# Patient Record
Sex: MALE | Race: WHITE | NOT HISPANIC OR LATINO | Employment: OTHER | URBAN - METROPOLITAN AREA
[De-identification: names, ages, dates, MRNs, and addresses within clinical notes are randomized per-mention and may not be internally consistent; named-entity substitution may affect disease eponyms.]

---

## 2020-01-01 ENCOUNTER — APPOINTMENT (OUTPATIENT)
Dept: RADIOLOGY | Facility: MEDICAL CENTER | Age: 60
DRG: 215 | End: 2020-01-01
Attending: INTERNAL MEDICINE
Payer: COMMERCIAL

## 2020-01-01 ENCOUNTER — TELEPHONE (OUTPATIENT)
Dept: CARDIOLOGY | Facility: MEDICAL CENTER | Age: 60
End: 2020-01-01

## 2020-01-01 ENCOUNTER — APPOINTMENT (OUTPATIENT)
Dept: RADIOLOGY | Facility: MEDICAL CENTER | Age: 60
DRG: 215 | End: 2020-01-01
Attending: PSYCHIATRY & NEUROLOGY
Payer: COMMERCIAL

## 2020-01-01 ENCOUNTER — APPOINTMENT (OUTPATIENT)
Dept: RADIOLOGY | Facility: MEDICAL CENTER | Age: 60
DRG: 215 | End: 2020-01-01
Attending: SURGERY
Payer: COMMERCIAL

## 2020-01-01 ENCOUNTER — APPOINTMENT (OUTPATIENT)
Dept: CARDIOLOGY | Facility: MEDICAL CENTER | Age: 60
DRG: 215 | End: 2020-01-01
Attending: INTERNAL MEDICINE
Payer: COMMERCIAL

## 2020-01-01 ENCOUNTER — APPOINTMENT (OUTPATIENT)
Dept: RADIOLOGY | Facility: MEDICAL CENTER | Age: 60
DRG: 215 | End: 2020-01-01
Attending: EMERGENCY MEDICINE
Payer: COMMERCIAL

## 2020-01-01 ENCOUNTER — ANESTHESIA EVENT (OUTPATIENT)
Dept: RADIOLOGY | Facility: MEDICAL CENTER | Age: 60
DRG: 215 | End: 2020-01-01
Payer: COMMERCIAL

## 2020-01-01 ENCOUNTER — ANESTHESIA EVENT (OUTPATIENT)
Dept: SURGERY | Facility: MEDICAL CENTER | Age: 60
DRG: 215 | End: 2020-01-01
Payer: COMMERCIAL

## 2020-01-01 ENCOUNTER — HOSPITAL ENCOUNTER (OUTPATIENT)
Facility: MEDICAL CENTER | Age: 60
End: 2020-04-09
Payer: COMMERCIAL

## 2020-01-01 ENCOUNTER — APPOINTMENT (OUTPATIENT)
Dept: RADIOLOGY | Facility: MEDICAL CENTER | Age: 60
DRG: 215 | End: 2020-01-01
Attending: NURSE PRACTITIONER
Payer: COMMERCIAL

## 2020-01-01 ENCOUNTER — ANESTHESIA (OUTPATIENT)
Dept: SURGERY | Facility: MEDICAL CENTER | Age: 60
DRG: 215 | End: 2020-01-01
Payer: COMMERCIAL

## 2020-01-01 ENCOUNTER — ANESTHESIA (OUTPATIENT)
Dept: RADIOLOGY | Facility: MEDICAL CENTER | Age: 60
DRG: 215 | End: 2020-01-01
Payer: COMMERCIAL

## 2020-01-01 ENCOUNTER — HOSPITAL ENCOUNTER (OUTPATIENT)
Facility: MEDICAL CENTER | Age: 60
End: 2020-07-30
Payer: COMMERCIAL

## 2020-01-01 ENCOUNTER — HOSPITAL ENCOUNTER (OUTPATIENT)
Facility: MEDICAL CENTER | Age: 60
End: 2020-04-10
Payer: COMMERCIAL

## 2020-01-01 ENCOUNTER — APPOINTMENT (OUTPATIENT)
Dept: CARDIOLOGY | Facility: MEDICAL CENTER | Age: 60
DRG: 215 | End: 2020-01-01
Attending: EMERGENCY MEDICINE
Payer: COMMERCIAL

## 2020-01-01 ENCOUNTER — HOSPITAL ENCOUNTER (INPATIENT)
Facility: MEDICAL CENTER | Age: 60
LOS: 7 days | DRG: 215 | End: 2020-08-06
Attending: EMERGENCY MEDICINE | Admitting: INTERNAL MEDICINE
Payer: COMMERCIAL

## 2020-01-01 ENCOUNTER — TELEMEDICINE (OUTPATIENT)
Dept: CARDIOLOGY | Facility: MEDICAL CENTER | Age: 60
End: 2020-01-01
Payer: COMMERCIAL

## 2020-01-01 ENCOUNTER — HOSPITAL ENCOUNTER (OUTPATIENT)
Facility: MEDICAL CENTER | Age: 60
End: 2020-04-07
Payer: COMMERCIAL

## 2020-01-01 ENCOUNTER — PATIENT OUTREACH (OUTPATIENT)
Dept: HEALTH INFORMATION MANAGEMENT | Facility: OTHER | Age: 60
End: 2020-01-01

## 2020-01-01 VITALS
WEIGHT: 204.15 LBS | BODY MASS INDEX: 29.23 KG/M2 | HEIGHT: 70 IN | HEART RATE: 142 BPM | SYSTOLIC BLOOD PRESSURE: 100 MMHG | DIASTOLIC BLOOD PRESSURE: 67 MMHG | RESPIRATION RATE: 23 BRPM | TEMPERATURE: 100 F | OXYGEN SATURATION: 97 %

## 2020-01-01 VITALS — HEIGHT: 70 IN | WEIGHT: 187 LBS | BODY MASS INDEX: 26.77 KG/M2

## 2020-01-01 DIAGNOSIS — I70.229 CRITICAL LOWER LIMB ISCHEMIA (HCC): ICD-10-CM

## 2020-01-01 DIAGNOSIS — N17.9 ACUTE KIDNEY INJURY (HCC): ICD-10-CM

## 2020-01-01 DIAGNOSIS — Z95.1 S/P CABG X 3: Chronic | ICD-10-CM

## 2020-01-01 DIAGNOSIS — I25.10 ASCVD (ARTERIOSCLEROTIC CARDIOVASCULAR DISEASE): ICD-10-CM

## 2020-01-01 DIAGNOSIS — R57.0 CARDIOGENIC SHOCK (HCC): ICD-10-CM

## 2020-01-01 DIAGNOSIS — J81.0 ACUTE PULMONARY EDEMA (HCC): ICD-10-CM

## 2020-01-01 DIAGNOSIS — I25.10 CORONARY ARTERY DISEASE INVOLVING NATIVE CORONARY ARTERY OF NATIVE HEART WITHOUT ANGINA PECTORIS: ICD-10-CM

## 2020-01-01 DIAGNOSIS — I50.9 CONGESTIVE HEART FAILURE, UNSPECIFIED HF CHRONICITY, UNSPECIFIED HEART FAILURE TYPE (HCC): ICD-10-CM

## 2020-01-01 DIAGNOSIS — I21.4 NSTEMI (NON-ST ELEVATED MYOCARDIAL INFARCTION) (HCC): ICD-10-CM

## 2020-01-01 DIAGNOSIS — J96.01 ACUTE RESPIRATORY FAILURE WITH HYPOXIA (HCC): ICD-10-CM

## 2020-01-01 DIAGNOSIS — R79.89 ELEVATED TROPONIN: ICD-10-CM

## 2020-01-01 DIAGNOSIS — I10 ESSENTIAL HYPERTENSION, BENIGN: ICD-10-CM

## 2020-01-01 DIAGNOSIS — I21.02 ACUTE ST ELEVATION MYOCARDIAL INFARCTION (STEMI) INVOLVING LEFT ANTERIOR DESCENDING (LAD) CORONARY ARTERY (HCC): ICD-10-CM

## 2020-01-01 LAB
ABO GROUP BLD: NORMAL
ABO GROUP BLD: NORMAL
ACT BLD: 120 SEC (ref 74–137)
ACT BLD: 175 SEC (ref 74–137)
ACT BLD: 213 SEC (ref 74–137)
ACT BLD: 235 SEC (ref 74–137)
ACT BLD: 252 SEC (ref 74–137)
ACTION RANGE TRIGGERED IACRT: NO
ACTION RANGE TRIGGERED IACRT: YES
ALBUMIN SERPL BCP-MCNC: 2.9 G/DL (ref 3.2–4.9)
ALBUMIN SERPL BCP-MCNC: 3 G/DL (ref 3.2–4.9)
ALBUMIN SERPL BCP-MCNC: 3.3 G/DL (ref 3.2–4.9)
ALBUMIN SERPL BCP-MCNC: 3.6 G/DL (ref 3.2–4.9)
ALBUMIN/GLOB SERPL: 1.3 G/DL
ALBUMIN/GLOB SERPL: 1.3 G/DL
ALBUMIN/GLOB SERPL: 1.4 G/DL
ALBUMIN/GLOB SERPL: 1.5 G/DL
ALP SERPL-CCNC: 24 U/L (ref 30–99)
ALP SERPL-CCNC: 26 U/L (ref 30–99)
ALP SERPL-CCNC: 26 U/L (ref 30–99)
ALP SERPL-CCNC: 27 U/L (ref 30–99)
ALP SERPL-CCNC: 28 U/L (ref 30–99)
ALP SERPL-CCNC: 28 U/L (ref 30–99)
ALP SERPL-CCNC: 35 U/L (ref 30–99)
ALT SERPL-CCNC: 101 U/L (ref 2–50)
ALT SERPL-CCNC: 11 U/L (ref 2–50)
ALT SERPL-CCNC: 12 U/L (ref 2–50)
ALT SERPL-CCNC: 15 U/L (ref 2–50)
ALT SERPL-CCNC: 22 U/L (ref 2–50)
ALT SERPL-CCNC: 27 U/L (ref 2–50)
ALT SERPL-CCNC: 31 U/L (ref 2–50)
ANION GAP SERPL CALC-SCNC: 12 MMOL/L (ref 7–16)
ANION GAP SERPL CALC-SCNC: 13 MMOL/L (ref 7–16)
ANION GAP SERPL CALC-SCNC: 13 MMOL/L (ref 7–16)
ANION GAP SERPL CALC-SCNC: 14 MMOL/L (ref 7–16)
ANION GAP SERPL CALC-SCNC: 15 MMOL/L (ref 7–16)
ANION GAP SERPL CALC-SCNC: 17 MMOL/L (ref 7–16)
ANION GAP SERPL CALC-SCNC: 18 MMOL/L (ref 7–16)
ANION GAP SERPL CALC-SCNC: 30 MMOL/L (ref 7–16)
APTT PPP: 114.9 SEC (ref 24.7–36)
APTT PPP: 116.1 SEC (ref 24.7–36)
APTT PPP: 140.9 SEC (ref 24.7–36)
APTT PPP: 150.4 SEC (ref 24.7–36)
APTT PPP: 186.2 SEC (ref 24.7–36)
APTT PPP: 199.2 SEC (ref 24.7–36)
APTT PPP: 224.5 SEC (ref 24.7–36)
APTT PPP: 47.1 SEC (ref 24.7–36)
APTT PPP: 48.5 SEC (ref 24.7–36)
APTT PPP: 52.2 SEC (ref 24.7–36)
APTT PPP: 63.6 SEC (ref 24.7–36)
APTT PPP: 65.1 SEC (ref 24.7–36)
APTT PPP: 67.1 SEC (ref 24.7–36)
APTT PPP: 70.8 SEC (ref 24.7–36)
APTT PPP: 79.8 SEC (ref 24.7–36)
APTT PPP: 81.8 SEC (ref 24.7–36)
APTT PPP: 85.3 SEC (ref 24.7–36)
APTT PPP: 96 SEC (ref 24.7–36)
APTT PPP: >240 SEC (ref 24.7–36)
AST SERPL-CCNC: 115 U/L (ref 12–45)
AST SERPL-CCNC: 132 U/L (ref 12–45)
AST SERPL-CCNC: 240 U/L (ref 12–45)
AST SERPL-CCNC: 43 U/L (ref 12–45)
AST SERPL-CCNC: 49 U/L (ref 12–45)
AST SERPL-CCNC: 50 U/L (ref 12–45)
AST SERPL-CCNC: 54 U/L (ref 12–45)
BACTERIA BLD CULT: NORMAL
BACTERIA BLD CULT: NORMAL
BARCODED ABORH UBTYP: 6200
BARCODED PRD CODE UBPRD: NORMAL
BARCODED UNIT NUM UBUNT: NORMAL
BASE EXCESS BLDA CALC-SCNC: -1 MMOL/L (ref -4–3)
BASE EXCESS BLDA CALC-SCNC: -1 MMOL/L (ref -4–3)
BASE EXCESS BLDA CALC-SCNC: -10 MMOL/L (ref -4–3)
BASE EXCESS BLDA CALC-SCNC: -10 MMOL/L (ref -4–3)
BASE EXCESS BLDA CALC-SCNC: -11 MMOL/L (ref -4–3)
BASE EXCESS BLDA CALC-SCNC: -12 MMOL/L (ref -4–3)
BASE EXCESS BLDA CALC-SCNC: -13 MMOL/L (ref -4–3)
BASE EXCESS BLDA CALC-SCNC: -14 MMOL/L (ref -4–3)
BASE EXCESS BLDA CALC-SCNC: -3 MMOL/L (ref -4–3)
BASE EXCESS BLDA CALC-SCNC: -5 MMOL/L (ref -4–3)
BASE EXCESS BLDA CALC-SCNC: -8 MMOL/L (ref -4–3)
BASE EXCESS BLDA CALC-SCNC: -9 MMOL/L (ref -4–3)
BASE EXCESS BLDA CALC-SCNC: 11 MMOL/L (ref -4–3)
BASE EXCESS BLDMV CALC-SCNC: -12 MMOL/L
BASE EXCESS BLDV CALC-SCNC: -12 MMOL/L
BASE EXCESS BLDV CALC-SCNC: -5 MMOL/L (ref -4–3)
BASOPHILS # BLD AUTO: 0.3 % (ref 0–1.8)
BASOPHILS # BLD AUTO: 0.4 % (ref 0–1.8)
BASOPHILS # BLD AUTO: 0.6 % (ref 0–1.8)
BASOPHILS # BLD AUTO: 0.6 % (ref 0–1.8)
BASOPHILS # BLD: 0.03 K/UL (ref 0–0.12)
BASOPHILS # BLD: 0.03 K/UL (ref 0–0.12)
BASOPHILS # BLD: 0.05 K/UL (ref 0–0.12)
BASOPHILS # BLD: 0.05 K/UL (ref 0–0.12)
BASOPHILS # BLD: 0.06 K/UL (ref 0–0.12)
BASOPHILS # BLD: 0.07 K/UL (ref 0–0.12)
BASOPHILS # BLD: 0.09 K/UL (ref 0–0.12)
BASOPHILS # BLD: 0.14 K/UL (ref 0–0.12)
BILIRUB SERPL-MCNC: 0.3 MG/DL (ref 0.1–1.5)
BILIRUB SERPL-MCNC: 0.4 MG/DL (ref 0.1–1.5)
BILIRUB SERPL-MCNC: 0.4 MG/DL (ref 0.1–1.5)
BILIRUB SERPL-MCNC: 0.5 MG/DL (ref 0.1–1.5)
BILIRUB SERPL-MCNC: 0.5 MG/DL (ref 0.1–1.5)
BILIRUB SERPL-MCNC: 0.7 MG/DL (ref 0.1–1.5)
BILIRUB SERPL-MCNC: 1.2 MG/DL (ref 0.1–1.5)
BLD GP AB SCN SERPL QL: NORMAL
BLD GP AB SCN SERPL QL: NORMAL
BODY TEMPERATURE: ABNORMAL CENTIGRADE
BODY TEMPERATURE: ABNORMAL DEGREES
BODY TEMPERATURE: NORMAL DEGREES
BUN SERPL-MCNC: 20 MG/DL (ref 8–22)
BUN SERPL-MCNC: 23 MG/DL (ref 8–22)
BUN SERPL-MCNC: 24 MG/DL (ref 8–22)
BUN SERPL-MCNC: 24 MG/DL (ref 8–22)
BUN SERPL-MCNC: 25 MG/DL (ref 8–22)
BUN SERPL-MCNC: 25 MG/DL (ref 8–22)
BUN SERPL-MCNC: 26 MG/DL (ref 8–22)
BUN SERPL-MCNC: 29 MG/DL (ref 8–22)
BUN SERPL-MCNC: 29 MG/DL (ref 8–22)
CA-I BLD ISE-SCNC: 1.29 MMOL/L (ref 1.1–1.3)
CA-I BLD ISE-SCNC: 1.38 MMOL/L (ref 1.1–1.3)
CA-I BLD ISE-SCNC: 2.04 MMOL/L (ref 1.1–1.3)
CA-I BLD ISE-SCNC: 2.05 MMOL/L (ref 1.1–1.3)
CA-I SERPL-SCNC: 1 MMOL/L (ref 1.1–1.3)
CALCIUM SERPL-MCNC: 7.5 MG/DL (ref 8.5–10.5)
CALCIUM SERPL-MCNC: 7.6 MG/DL (ref 8.5–10.5)
CALCIUM SERPL-MCNC: 7.7 MG/DL (ref 8.5–10.5)
CALCIUM SERPL-MCNC: 7.8 MG/DL (ref 8.5–10.5)
CALCIUM SERPL-MCNC: 7.9 MG/DL (ref 8.5–10.5)
CALCIUM SERPL-MCNC: 8.1 MG/DL (ref 8.5–10.5)
CALCIUM SERPL-MCNC: 8.2 MG/DL (ref 8.5–10.5)
CALCIUM SERPL-MCNC: 8.3 MG/DL (ref 8.5–10.5)
CALCIUM SERPL-MCNC: 8.3 MG/DL (ref 8.5–10.5)
CALCIUM SERPL-MCNC: 8.5 MG/DL (ref 8.5–10.5)
CALCIUM SERPL-MCNC: 8.5 MG/DL (ref 8.5–10.5)
CALCIUM SERPL-MCNC: 8.7 MG/DL (ref 8.5–10.5)
CALCIUM SERPL-MCNC: 8.9 MG/DL (ref 8.5–10.5)
CFT BLD TEG: 25.5 MIN (ref 5–10)
CFT P HPASE BLD TEG: 7.7 MIN (ref 5–10)
CHLORIDE SERPL-SCNC: 105 MMOL/L (ref 96–112)
CHLORIDE SERPL-SCNC: 106 MMOL/L (ref 96–112)
CHLORIDE SERPL-SCNC: 107 MMOL/L (ref 96–112)
CHLORIDE SERPL-SCNC: 110 MMOL/L (ref 96–112)
CHLORIDE SERPL-SCNC: 110 MMOL/L (ref 96–112)
CHLORIDE SERPL-SCNC: 111 MMOL/L (ref 96–112)
CHLORIDE SERPL-SCNC: 111 MMOL/L (ref 96–112)
CHLORIDE SERPL-SCNC: 114 MMOL/L (ref 96–112)
CHLORIDE SERPL-SCNC: 114 MMOL/L (ref 96–112)
CHLORIDE SERPL-SCNC: 115 MMOL/L (ref 96–112)
CLOT ANGLE BLD TEG: 19.5 DEGREES (ref 53–72)
CLOT ANGLE P HPASE BLD TEG: 57 DEGREES (ref 53–72)
CLOT INIT P HPASE BLD TEG: 2.3 MIN (ref 1–3)
CLOT LYSIS 30M P MA LENFR BLD TEG: 0 % (ref 0–8)
CLOT LYSIS 30M P MA LENFR BLD TEG: 0 % (ref 0–8)
CO2 BLDA-SCNC: 12 MMOL/L (ref 20–33)
CO2 BLDA-SCNC: 13 MMOL/L (ref 20–33)
CO2 BLDA-SCNC: 15 MMOL/L (ref 20–33)
CO2 BLDA-SCNC: 16 MMOL/L (ref 20–33)
CO2 BLDA-SCNC: 17 MMOL/L (ref 20–33)
CO2 BLDA-SCNC: 18 MMOL/L (ref 20–33)
CO2 BLDA-SCNC: 20 MMOL/L (ref 20–33)
CO2 BLDA-SCNC: 20 MMOL/L (ref 20–33)
CO2 BLDA-SCNC: 21 MMOL/L (ref 20–33)
CO2 BLDA-SCNC: 22 MMOL/L (ref 20–33)
CO2 BLDA-SCNC: 22 MMOL/L (ref 20–33)
CO2 BLDA-SCNC: 23 MMOL/L (ref 20–33)
CO2 BLDA-SCNC: 39 MMOL/L (ref 20–33)
CO2 BLDV-SCNC: 20 MMOL/L (ref 20–33)
CO2 SERPL-SCNC: 11 MMOL/L (ref 20–33)
CO2 SERPL-SCNC: 13 MMOL/L (ref 20–33)
CO2 SERPL-SCNC: 13 MMOL/L (ref 20–33)
CO2 SERPL-SCNC: 14 MMOL/L (ref 20–33)
CO2 SERPL-SCNC: 14 MMOL/L (ref 20–33)
CO2 SERPL-SCNC: 15 MMOL/L (ref 20–33)
CO2 SERPL-SCNC: 15 MMOL/L (ref 20–33)
CO2 SERPL-SCNC: 17 MMOL/L (ref 20–33)
CO2 SERPL-SCNC: 19 MMOL/L (ref 20–33)
CO2 SERPL-SCNC: 19 MMOL/L (ref 20–33)
CO2 SERPL-SCNC: 21 MMOL/L (ref 20–33)
CO2 SERPL-SCNC: 22 MMOL/L (ref 20–33)
CO2 SERPL-SCNC: 9 MMOL/L (ref 20–33)
COMPONENT R 8504R: NORMAL
COVID ORDER STATUS COVID19: NORMAL
COVID ORDER STATUS COVID19: NORMAL
CREAT SERPL-MCNC: 0.96 MG/DL (ref 0.5–1.4)
CREAT SERPL-MCNC: 0.98 MG/DL (ref 0.5–1.4)
CREAT SERPL-MCNC: 0.99 MG/DL (ref 0.5–1.4)
CREAT SERPL-MCNC: 1.02 MG/DL (ref 0.5–1.4)
CREAT SERPL-MCNC: 1.05 MG/DL (ref 0.5–1.4)
CREAT SERPL-MCNC: 1.06 MG/DL (ref 0.5–1.4)
CREAT SERPL-MCNC: 1.07 MG/DL (ref 0.5–1.4)
CREAT SERPL-MCNC: 1.09 MG/DL (ref 0.5–1.4)
CREAT SERPL-MCNC: 1.18 MG/DL (ref 0.5–1.4)
CREAT SERPL-MCNC: 1.22 MG/DL (ref 0.5–1.4)
CREAT SERPL-MCNC: 1.26 MG/DL (ref 0.5–1.4)
CREAT SERPL-MCNC: 1.38 MG/DL (ref 0.5–1.4)
CREAT SERPL-MCNC: 1.44 MG/DL (ref 0.5–1.4)
CRP SERPL HS-MCNC: 4.17 MG/DL (ref 0–0.75)
CRP SERPL HS-MCNC: 4.65 MG/DL (ref 0–0.75)
CT.EXTRINSIC BLD ROTEM: 12 MIN (ref 1–3)
EKG IMPRESSION: NORMAL
EOSINOPHIL # BLD AUTO: 0.03 K/UL (ref 0–0.51)
EOSINOPHIL # BLD AUTO: 0.14 K/UL (ref 0–0.51)
EOSINOPHIL # BLD AUTO: 0.14 K/UL (ref 0–0.51)
EOSINOPHIL # BLD AUTO: 0.16 K/UL (ref 0–0.51)
EOSINOPHIL # BLD AUTO: 0.25 K/UL (ref 0–0.51)
EOSINOPHIL # BLD AUTO: 0.3 K/UL (ref 0–0.51)
EOSINOPHIL # BLD AUTO: 0.32 K/UL (ref 0–0.51)
EOSINOPHIL # BLD AUTO: 0.37 K/UL (ref 0–0.51)
EOSINOPHIL NFR BLD: 0.1 % (ref 0–6.9)
EOSINOPHIL NFR BLD: 1 % (ref 0–6.9)
EOSINOPHIL NFR BLD: 1.6 % (ref 0–6.9)
EOSINOPHIL NFR BLD: 1.9 % (ref 0–6.9)
EOSINOPHIL NFR BLD: 3.2 % (ref 0–6.9)
EOSINOPHIL NFR BLD: 3.3 % (ref 0–6.9)
ERYTHROCYTE [DISTWIDTH] IN BLOOD BY AUTOMATED COUNT: 49.6 FL (ref 35.9–50)
ERYTHROCYTE [DISTWIDTH] IN BLOOD BY AUTOMATED COUNT: 50.8 FL (ref 35.9–50)
ERYTHROCYTE [DISTWIDTH] IN BLOOD BY AUTOMATED COUNT: 51.1 FL (ref 35.9–50)
ERYTHROCYTE [DISTWIDTH] IN BLOOD BY AUTOMATED COUNT: 51.5 FL (ref 35.9–50)
ERYTHROCYTE [DISTWIDTH] IN BLOOD BY AUTOMATED COUNT: 52.1 FL (ref 35.9–50)
ERYTHROCYTE [DISTWIDTH] IN BLOOD BY AUTOMATED COUNT: 52.1 FL (ref 35.9–50)
ERYTHROCYTE [DISTWIDTH] IN BLOOD BY AUTOMATED COUNT: 52.3 FL (ref 35.9–50)
ERYTHROCYTE [DISTWIDTH] IN BLOOD BY AUTOMATED COUNT: 52.9 FL (ref 35.9–50)
ERYTHROCYTE [DISTWIDTH] IN BLOOD BY AUTOMATED COUNT: 53.2 FL (ref 35.9–50)
ERYTHROCYTE [DISTWIDTH] IN BLOOD BY AUTOMATED COUNT: 53.4 FL (ref 35.9–50)
ERYTHROCYTE [DISTWIDTH] IN BLOOD BY AUTOMATED COUNT: 54.3 FL (ref 35.9–50)
ERYTHROCYTE [DISTWIDTH] IN BLOOD BY AUTOMATED COUNT: 55.9 FL (ref 35.9–50)
ERYTHROCYTE [DISTWIDTH] IN BLOOD BY AUTOMATED COUNT: 62.6 FL (ref 35.9–50)
FIBRINOGEN PPP-MCNC: 377 MG/DL (ref 215–460)
GLOBULIN SER CALC-MCNC: 1.9 G/DL (ref 1.9–3.5)
GLOBULIN SER CALC-MCNC: 2.1 G/DL (ref 1.9–3.5)
GLOBULIN SER CALC-MCNC: 2.2 G/DL (ref 1.9–3.5)
GLOBULIN SER CALC-MCNC: 2.3 G/DL (ref 1.9–3.5)
GLOBULIN SER CALC-MCNC: 2.3 G/DL (ref 1.9–3.5)
GLOBULIN SER CALC-MCNC: 2.4 G/DL (ref 1.9–3.5)
GLOBULIN SER CALC-MCNC: 2.6 G/DL (ref 1.9–3.5)
GLUCOSE BLD-MCNC: 100 MG/DL (ref 65–99)
GLUCOSE BLD-MCNC: 108 MG/DL (ref 65–99)
GLUCOSE BLD-MCNC: 110 MG/DL (ref 65–99)
GLUCOSE BLD-MCNC: 112 MG/DL (ref 65–99)
GLUCOSE BLD-MCNC: 113 MG/DL (ref 65–99)
GLUCOSE BLD-MCNC: 113 MG/DL (ref 65–99)
GLUCOSE BLD-MCNC: 114 MG/DL (ref 65–99)
GLUCOSE BLD-MCNC: 118 MG/DL (ref 65–99)
GLUCOSE BLD-MCNC: 119 MG/DL (ref 65–99)
GLUCOSE BLD-MCNC: 119 MG/DL (ref 65–99)
GLUCOSE BLD-MCNC: 122 MG/DL (ref 65–99)
GLUCOSE BLD-MCNC: 125 MG/DL (ref 65–99)
GLUCOSE BLD-MCNC: 127 MG/DL (ref 65–99)
GLUCOSE BLD-MCNC: 129 MG/DL (ref 65–99)
GLUCOSE BLD-MCNC: 130 MG/DL (ref 65–99)
GLUCOSE BLD-MCNC: 131 MG/DL (ref 65–99)
GLUCOSE BLD-MCNC: 133 MG/DL (ref 65–99)
GLUCOSE BLD-MCNC: 135 MG/DL (ref 65–99)
GLUCOSE BLD-MCNC: 135 MG/DL (ref 65–99)
GLUCOSE BLD-MCNC: 137 MG/DL (ref 65–99)
GLUCOSE BLD-MCNC: 139 MG/DL (ref 65–99)
GLUCOSE BLD-MCNC: 140 MG/DL (ref 65–99)
GLUCOSE BLD-MCNC: 143 MG/DL (ref 65–99)
GLUCOSE BLD-MCNC: 169 MG/DL (ref 65–99)
GLUCOSE BLD-MCNC: 178 MG/DL (ref 65–99)
GLUCOSE BLD-MCNC: 70 MG/DL (ref 65–99)
GLUCOSE BLD-MCNC: 78 MG/DL (ref 65–99)
GLUCOSE BLD-MCNC: 83 MG/DL (ref 65–99)
GLUCOSE SERPL-MCNC: 106 MG/DL (ref 65–99)
GLUCOSE SERPL-MCNC: 117 MG/DL (ref 65–99)
GLUCOSE SERPL-MCNC: 125 MG/DL (ref 65–99)
GLUCOSE SERPL-MCNC: 127 MG/DL (ref 65–99)
GLUCOSE SERPL-MCNC: 130 MG/DL (ref 65–99)
GLUCOSE SERPL-MCNC: 138 MG/DL (ref 65–99)
GLUCOSE SERPL-MCNC: 140 MG/DL (ref 65–99)
GLUCOSE SERPL-MCNC: 141 MG/DL (ref 65–99)
GLUCOSE SERPL-MCNC: 143 MG/DL (ref 65–99)
GLUCOSE SERPL-MCNC: 147 MG/DL (ref 65–99)
GLUCOSE SERPL-MCNC: 152 MG/DL (ref 65–99)
GLUCOSE SERPL-MCNC: 181 MG/DL (ref 65–99)
GLUCOSE SERPL-MCNC: 186 MG/DL (ref 65–99)
HCO3 BLDA-SCNC: 11.7 MMOL/L (ref 17–25)
HCO3 BLDA-SCNC: 11.9 MMOL/L (ref 17–25)
HCO3 BLDA-SCNC: 14.2 MMOL/L (ref 17–25)
HCO3 BLDA-SCNC: 15.6 MMOL/L (ref 17–25)
HCO3 BLDA-SCNC: 15.8 MMOL/L (ref 17–25)
HCO3 BLDA-SCNC: 16.5 MMOL/L (ref 17–25)
HCO3 BLDA-SCNC: 18.1 MMOL/L (ref 17–25)
HCO3 BLDA-SCNC: 18.8 MMOL/L (ref 17–25)
HCO3 BLDA-SCNC: 19.8 MMOL/L (ref 17–25)
HCO3 BLDA-SCNC: 21.2 MMOL/L (ref 17–25)
HCO3 BLDA-SCNC: 21.7 MMOL/L (ref 17–25)
HCO3 BLDA-SCNC: 21.8 MMOL/L (ref 17–25)
HCO3 BLDA-SCNC: 37.5 MMOL/L (ref 17–25)
HCO3 BLDMV-SCNC: 14 MMOL/L
HCO3 BLDV-SCNC: 14 MMOL/L (ref 24–28)
HCO3 BLDV-SCNC: 18.9 MMOL/L (ref 24–28)
HCT VFR BLD AUTO: 21.7 % (ref 42–52)
HCT VFR BLD AUTO: 21.8 % (ref 42–52)
HCT VFR BLD AUTO: 22.2 % (ref 42–52)
HCT VFR BLD AUTO: 23 % (ref 42–52)
HCT VFR BLD AUTO: 23.2 % (ref 42–52)
HCT VFR BLD AUTO: 23.9 % (ref 42–52)
HCT VFR BLD AUTO: 24.1 % (ref 42–52)
HCT VFR BLD AUTO: 25.4 % (ref 42–52)
HCT VFR BLD AUTO: 25.8 % (ref 42–52)
HCT VFR BLD AUTO: 26.4 % (ref 42–52)
HCT VFR BLD AUTO: 29.1 % (ref 42–52)
HCT VFR BLD AUTO: 32.2 % (ref 42–52)
HCT VFR BLD AUTO: 37.5 % (ref 42–52)
HCT VFR BLD AUTO: 45.9 % (ref 42–52)
HCT VFR BLD CALC: 23 % (ref 42–52)
HCT VFR BLD CALC: 24 % (ref 42–52)
HCT VFR BLD CALC: 24 % (ref 42–52)
HCT VFR BLD CALC: 33 % (ref 42–52)
HGB BLD-MCNC: 11.2 G/DL (ref 14–18)
HGB BLD-MCNC: 12.6 G/DL (ref 14–18)
HGB BLD-MCNC: 15 G/DL (ref 14–18)
HGB BLD-MCNC: 6.8 G/DL (ref 14–18)
HGB BLD-MCNC: 7.2 G/DL (ref 14–18)
HGB BLD-MCNC: 7.3 G/DL (ref 14–18)
HGB BLD-MCNC: 7.7 G/DL (ref 14–18)
HGB BLD-MCNC: 7.7 G/DL (ref 14–18)
HGB BLD-MCNC: 7.8 G/DL (ref 14–18)
HGB BLD-MCNC: 7.9 G/DL (ref 14–18)
HGB BLD-MCNC: 8.1 G/DL (ref 14–18)
HGB BLD-MCNC: 8.2 G/DL (ref 14–18)
HGB BLD-MCNC: 8.2 G/DL (ref 14–18)
HGB BLD-MCNC: 8.4 G/DL (ref 14–18)
HGB BLD-MCNC: 8.4 G/DL (ref 14–18)
HGB BLD-MCNC: 8.6 G/DL (ref 14–18)
HGB BLD-MCNC: 9.5 G/DL (ref 14–18)
HGB BLD-MCNC: 9.7 G/DL (ref 14–18)
HOROWITZ INDEX BLDA+IHG-RTO: 120 MM[HG]
HOROWITZ INDEX BLDA+IHG-RTO: 126 MM[HG]
HOROWITZ INDEX BLDA+IHG-RTO: 148 MM[HG]
HOROWITZ INDEX BLDA+IHG-RTO: 206 MM[HG]
HOROWITZ INDEX BLDA+IHG-RTO: 233 MM[HG]
HOROWITZ INDEX BLDA+IHG-RTO: 248 MM[HG]
HOROWITZ INDEX BLDA+IHG-RTO: 267 MM[HG]
HOROWITZ INDEX BLDA+IHG-RTO: 323 MM[HG]
HOROWITZ INDEX BLDA+IHG-RTO: 344 MM[HG]
HOROWITZ INDEX BLDA+IHG-RTO: 63 MM[HG]
HOROWITZ INDEX BLDV+IHG-RTO: 113 MM[HG]
IMM GRANULOCYTES # BLD AUTO: 0.05 K/UL (ref 0–0.11)
IMM GRANULOCYTES # BLD AUTO: 0.06 K/UL (ref 0–0.11)
IMM GRANULOCYTES # BLD AUTO: 0.08 K/UL (ref 0–0.11)
IMM GRANULOCYTES # BLD AUTO: 0.09 K/UL (ref 0–0.11)
IMM GRANULOCYTES # BLD AUTO: 0.1 K/UL (ref 0–0.11)
IMM GRANULOCYTES # BLD AUTO: 0.15 K/UL (ref 0–0.11)
IMM GRANULOCYTES # BLD AUTO: 0.15 K/UL (ref 0–0.11)
IMM GRANULOCYTES # BLD AUTO: 0.17 K/UL (ref 0–0.11)
IMM GRANULOCYTES NFR BLD AUTO: 0.5 % (ref 0–0.9)
IMM GRANULOCYTES NFR BLD AUTO: 0.5 % (ref 0–0.9)
IMM GRANULOCYTES NFR BLD AUTO: 0.6 % (ref 0–0.9)
IMM GRANULOCYTES NFR BLD AUTO: 0.7 % (ref 0–0.9)
IMM GRANULOCYTES NFR BLD AUTO: 0.9 % (ref 0–0.9)
IMM GRANULOCYTES NFR BLD AUTO: 1 % (ref 0–0.9)
INR PPP: 1.12 (ref 0.87–1.13)
INR PPP: 1.68 (ref 0.87–1.13)
INST. QUALIFIED PATIENT IIQPT: YES
LACTATE BLD-SCNC: 1 MMOL/L (ref 0.5–2)
LACTATE BLD-SCNC: 1.1 MMOL/L (ref 0.5–2)
LACTATE BLD-SCNC: 1.1 MMOL/L (ref 0.5–2)
LACTATE BLD-SCNC: 1.3 MMOL/L (ref 0.5–2)
LACTATE BLD-SCNC: 9 MMOL/L (ref 0.5–2)
LV EJECT FRACT  99904: 25
LYMPHOCYTES # BLD AUTO: 1.36 K/UL (ref 1–4.8)
LYMPHOCYTES # BLD AUTO: 1.41 K/UL (ref 1–4.8)
LYMPHOCYTES # BLD AUTO: 1.6 K/UL (ref 1–4.8)
LYMPHOCYTES # BLD AUTO: 1.72 K/UL (ref 1–4.8)
LYMPHOCYTES # BLD AUTO: 2.01 K/UL (ref 1–4.8)
LYMPHOCYTES # BLD AUTO: 2.13 K/UL (ref 1–4.8)
LYMPHOCYTES # BLD AUTO: 2.22 K/UL (ref 1–4.8)
LYMPHOCYTES # BLD AUTO: 2.77 K/UL (ref 1–4.8)
LYMPHOCYTES NFR BLD: 10.6 % (ref 22–41)
LYMPHOCYTES NFR BLD: 14 % (ref 22–41)
LYMPHOCYTES NFR BLD: 14.5 % (ref 22–41)
LYMPHOCYTES NFR BLD: 16.3 % (ref 22–41)
LYMPHOCYTES NFR BLD: 17.7 % (ref 22–41)
LYMPHOCYTES NFR BLD: 17.8 % (ref 22–41)
LYMPHOCYTES NFR BLD: 6.6 % (ref 22–41)
LYMPHOCYTES NFR BLD: 8.8 % (ref 22–41)
MAGNESIUM SERPL-MCNC: 1.8 MG/DL (ref 1.5–2.5)
MAGNESIUM SERPL-MCNC: 1.9 MG/DL (ref 1.5–2.5)
MAGNESIUM SERPL-MCNC: 2.3 MG/DL (ref 1.5–2.5)
MAGNESIUM SERPL-MCNC: 2.4 MG/DL (ref 1.5–2.5)
MAGNESIUM SERPL-MCNC: 2.5 MG/DL (ref 1.5–2.5)
MCF BLD TEG: 60 MM (ref 50–70)
MCF P HPASE BLD TEG: 57 MM (ref 50–70)
MCH RBC QN AUTO: 31.3 PG (ref 27–33)
MCH RBC QN AUTO: 31.6 PG (ref 27–33)
MCH RBC QN AUTO: 31.7 PG (ref 27–33)
MCH RBC QN AUTO: 31.8 PG (ref 27–33)
MCH RBC QN AUTO: 31.8 PG (ref 27–33)
MCH RBC QN AUTO: 32.1 PG (ref 27–33)
MCH RBC QN AUTO: 32.1 PG (ref 27–33)
MCH RBC QN AUTO: 32.2 PG (ref 27–33)
MCH RBC QN AUTO: 32.3 PG (ref 27–33)
MCH RBC QN AUTO: 32.4 PG (ref 27–33)
MCH RBC QN AUTO: 32.8 PG (ref 27–33)
MCHC RBC AUTO-ENTMCNC: 29.5 G/DL (ref 33.7–35.3)
MCHC RBC AUTO-ENTMCNC: 31.2 G/DL (ref 33.7–35.3)
MCHC RBC AUTO-ENTMCNC: 31.8 G/DL (ref 33.7–35.3)
MCHC RBC AUTO-ENTMCNC: 32.7 G/DL (ref 33.7–35.3)
MCHC RBC AUTO-ENTMCNC: 32.8 G/DL (ref 33.7–35.3)
MCHC RBC AUTO-ENTMCNC: 32.9 G/DL (ref 33.7–35.3)
MCHC RBC AUTO-ENTMCNC: 33.1 G/DL (ref 33.7–35.3)
MCHC RBC AUTO-ENTMCNC: 33.2 G/DL (ref 33.7–35.3)
MCHC RBC AUTO-ENTMCNC: 33.2 G/DL (ref 33.7–35.3)
MCHC RBC AUTO-ENTMCNC: 33.3 G/DL (ref 33.7–35.3)
MCHC RBC AUTO-ENTMCNC: 33.5 G/DL (ref 33.7–35.3)
MCHC RBC AUTO-ENTMCNC: 33.6 G/DL (ref 33.7–35.3)
MCHC RBC AUTO-ENTMCNC: 33.9 G/DL (ref 33.7–35.3)
MCV RBC AUTO: 101.9 FL (ref 81.4–97.8)
MCV RBC AUTO: 103.1 FL (ref 81.4–97.8)
MCV RBC AUTO: 109.5 FL (ref 81.4–97.8)
MCV RBC AUTO: 94.8 FL (ref 81.4–97.8)
MCV RBC AUTO: 95 FL (ref 81.4–97.8)
MCV RBC AUTO: 95.2 FL (ref 81.4–97.8)
MCV RBC AUTO: 95.6 FL (ref 81.4–97.8)
MCV RBC AUTO: 96.1 FL (ref 81.4–97.8)
MCV RBC AUTO: 96.4 FL (ref 81.4–97.8)
MCV RBC AUTO: 96.7 FL (ref 81.4–97.8)
MCV RBC AUTO: 97.3 FL (ref 81.4–97.8)
MCV RBC AUTO: 97.3 FL (ref 81.4–97.8)
MCV RBC AUTO: 98.7 FL (ref 81.4–97.8)
MONOCYTES # BLD AUTO: 0.95 K/UL (ref 0–0.85)
MONOCYTES # BLD AUTO: 1.14 K/UL (ref 0–0.85)
MONOCYTES # BLD AUTO: 1.18 K/UL (ref 0–0.85)
MONOCYTES # BLD AUTO: 1.54 K/UL (ref 0–0.85)
MONOCYTES # BLD AUTO: 1.59 K/UL (ref 0–0.85)
MONOCYTES # BLD AUTO: 1.61 K/UL (ref 0–0.85)
MONOCYTES # BLD AUTO: 1.74 K/UL (ref 0–0.85)
MONOCYTES # BLD AUTO: 1.82 K/UL (ref 0–0.85)
MONOCYTES NFR BLD AUTO: 10.3 % (ref 0–13.4)
MONOCYTES NFR BLD AUTO: 10.3 % (ref 0–13.4)
MONOCYTES NFR BLD AUTO: 10.4 % (ref 0–13.4)
MONOCYTES NFR BLD AUTO: 10.8 % (ref 0–13.4)
MONOCYTES NFR BLD AUTO: 12.4 % (ref 0–13.4)
MONOCYTES NFR BLD AUTO: 6.3 % (ref 0–13.4)
MONOCYTES NFR BLD AUTO: 8.4 % (ref 0–13.4)
MONOCYTES NFR BLD AUTO: 9.4 % (ref 0–13.4)
MRSA DNA SPEC QL NAA+PROBE: NORMAL
NEUTROPHILS # BLD AUTO: 10.38 K/UL (ref 1.82–7.42)
NEUTROPHILS # BLD AUTO: 10.97 K/UL (ref 1.82–7.42)
NEUTROPHILS # BLD AUTO: 12.15 K/UL (ref 1.82–7.42)
NEUTROPHILS # BLD AUTO: 12.2 K/UL (ref 1.82–7.42)
NEUTROPHILS # BLD AUTO: 20.9 K/UL (ref 1.82–7.42)
NEUTROPHILS # BLD AUTO: 7.31 K/UL (ref 1.82–7.42)
NEUTROPHILS # BLD AUTO: 7.66 K/UL (ref 1.82–7.42)
NEUTROPHILS # BLD AUTO: 9.98 K/UL (ref 1.82–7.42)
NEUTROPHILS NFR BLD: 67.7 % (ref 44–72)
NEUTROPHILS NFR BLD: 69.8 % (ref 44–72)
NEUTROPHILS NFR BLD: 70.8 % (ref 44–72)
NEUTROPHILS NFR BLD: 72.6 % (ref 44–72)
NEUTROPHILS NFR BLD: 73.3 % (ref 44–72)
NEUTROPHILS NFR BLD: 75.4 % (ref 44–72)
NEUTROPHILS NFR BLD: 78.3 % (ref 44–72)
NEUTROPHILS NFR BLD: 85.7 % (ref 44–72)
NRBC # BLD AUTO: 0 K/UL
NRBC # BLD AUTO: 0.02 K/UL
NRBC # BLD AUTO: 0.04 K/UL
NRBC # BLD AUTO: 0.07 K/UL
NRBC # BLD AUTO: 0.09 K/UL
NRBC BLD-RTO: 0 /100 WBC
NRBC BLD-RTO: 0.2 /100 WBC
NRBC BLD-RTO: 0.4 /100 WBC
NRBC BLD-RTO: 0.4 /100 WBC
NRBC BLD-RTO: 0.6 /100 WBC
NT-PROBNP SERPL IA-MCNC: 4752 PG/ML (ref 0–125)
NT-PROBNP SERPL IA-MCNC: 8120 PG/ML (ref 0–125)
O2/TOTAL GAS SETTING VFR VENT: 100 %
O2/TOTAL GAS SETTING VFR VENT: 30 %
O2/TOTAL GAS SETTING VFR VENT: 30 %
O2/TOTAL GAS SETTING VFR VENT: 40 %
O2/TOTAL GAS SETTING VFR VENT: 50 %
O2/TOTAL GAS SETTING VFR VENT: 50 %
O2/TOTAL GAS SETTING VFR VENT: 60 %
PA AA BLD-ACNC: 0 %
PA ADP BLD-ACNC: 0 %
PCO2 BLDA: 23.7 MMHG (ref 26–37)
PCO2 BLDA: 25.3 MMHG (ref 26–37)
PCO2 BLDA: 25.6 MMHG (ref 26–37)
PCO2 BLDA: 25.6 MMHG (ref 26–37)
PCO2 BLDA: 26.9 MMHG (ref 26–37)
PCO2 BLDA: 27.5 MMHG (ref 26–37)
PCO2 BLDA: 28.4 MMHG (ref 26–37)
PCO2 BLDA: 33 MMHG (ref 26–37)
PCO2 BLDA: 41.6 MMHG (ref 26–37)
PCO2 BLDA: 43.4 MMHG (ref 26–37)
PCO2 BLDA: 50.3 MMHG (ref 26–37)
PCO2 BLDA: 53.8 MMHG (ref 26–37)
PCO2 BLDA: 57.7 MMHG (ref 26–37)
PCO2 BLDMV: 32.6 MMHG
PCO2 BLDV: 30.7 MMHG (ref 41–51)
PCO2 BLDV: 31.6 MMHG (ref 41–51)
PCO2 TEMP ADJ BLDA: 23.1 MMHG (ref 26–37)
PCO2 TEMP ADJ BLDA: 26 MMHG (ref 26–37)
PCO2 TEMP ADJ BLDA: 26.2 MMHG (ref 26–37)
PCO2 TEMP ADJ BLDA: 26.5 MMHG (ref 26–37)
PCO2 TEMP ADJ BLDA: 28 MMHG (ref 26–37)
PCO2 TEMP ADJ BLDA: 28.9 MMHG (ref 26–37)
PCO2 TEMP ADJ BLDA: 30.2 MMHG (ref 26–37)
PCO2 TEMP ADJ BLDA: 30.9 MMHG (ref 26–37)
PCO2 TEMP ADJ BLDA: 51.2 MMHG (ref 26–37)
PCO2 TEMP ADJ BLDV: 31.9 MMHG (ref 41–51)
PH BLDA: 7.17 [PH] (ref 7.4–7.5)
PH BLDA: 7.21 [PH] (ref 7.4–7.5)
PH BLDA: 7.28 [PH] (ref 7.4–7.5)
PH BLDA: 7.3 [PH] (ref 7.4–7.5)
PH BLDA: 7.35 [PH] (ref 7.4–7.5)
PH BLDA: 7.36 [PH] (ref 7.4–7.5)
PH BLDA: 7.42 [PH] (ref 7.4–7.5)
PH BLDA: 7.42 [PH] (ref 7.4–7.5)
PH BLDA: 7.45 [PH] (ref 7.4–7.5)
PH BLDA: 7.51 [PH] (ref 7.4–7.5)
PH BLDA: 7.54 [PH] (ref 7.4–7.5)
PH BLDMV: 7.26 [PH]
PH BLDV: 7.26 [PH] (ref 7.31–7.45)
PH BLDV: 7.4 [PH] (ref 7.31–7.45)
PH TEMP ADJ BLDA: 7.16 [PH] (ref 7.4–7.5)
PH TEMP ADJ BLDA: 7.27 [PH] (ref 7.4–7.5)
PH TEMP ADJ BLDA: 7.31 [PH] (ref 7.4–7.5)
PH TEMP ADJ BLDA: 7.33 [PH] (ref 7.4–7.5)
PH TEMP ADJ BLDA: 7.35 [PH] (ref 7.4–7.5)
PH TEMP ADJ BLDA: 7.44 [PH] (ref 7.4–7.5)
PH TEMP ADJ BLDA: 7.44 [PH] (ref 7.4–7.5)
PH TEMP ADJ BLDA: 7.49 [PH] (ref 7.4–7.5)
PH TEMP ADJ BLDA: 7.52 [PH] (ref 7.4–7.5)
PH TEMP ADJ BLDV: 7.38 [PH] (ref 7.31–7.45)
PHOSPHATE SERPL-MCNC: 10.4 MG/DL (ref 2.5–4.5)
PHOSPHATE SERPL-MCNC: 3 MG/DL (ref 2.5–4.5)
PHOSPHATE SERPL-MCNC: 3.6 MG/DL (ref 2.5–4.5)
PLATELET # BLD AUTO: 111 K/UL (ref 164–446)
PLATELET # BLD AUTO: 120 K/UL (ref 164–446)
PLATELET # BLD AUTO: 128 K/UL (ref 164–446)
PLATELET # BLD AUTO: 128 K/UL (ref 164–446)
PLATELET # BLD AUTO: 136 K/UL (ref 164–446)
PLATELET # BLD AUTO: 144 K/UL (ref 164–446)
PLATELET # BLD AUTO: 179 K/UL (ref 164–446)
PLATELET # BLD AUTO: 216 K/UL (ref 164–446)
PLATELET # BLD AUTO: 245 K/UL (ref 164–446)
PLATELET # BLD AUTO: 274 K/UL (ref 164–446)
PLATELET # BLD AUTO: 309 K/UL (ref 164–446)
PLATELET # BLD AUTO: 78 K/UL (ref 164–446)
PLATELET # BLD AUTO: 90 K/UL (ref 164–446)
PMV BLD AUTO: 11 FL (ref 9–12.9)
PMV BLD AUTO: 11 FL (ref 9–12.9)
PMV BLD AUTO: 11.1 FL (ref 9–12.9)
PMV BLD AUTO: 11.2 FL (ref 9–12.9)
PMV BLD AUTO: 11.3 FL (ref 9–12.9)
PMV BLD AUTO: 11.6 FL (ref 9–12.9)
PMV BLD AUTO: 11.9 FL (ref 9–12.9)
PMV BLD AUTO: 12.5 FL (ref 9–12.9)
PMV BLD AUTO: 13.1 FL (ref 9–12.9)
PO2 BLDA: 103 MMHG (ref 64–87)
PO2 BLDA: 126 MMHG (ref 64–87)
PO2 BLDA: 194 MMHG (ref 64–87)
PO2 BLDA: 344 MMHG (ref 64–87)
PO2 BLDA: 48 MMHG (ref 64–87)
PO2 BLDA: 63 MMHG (ref 64–87)
PO2 BLDA: 70 MMHG (ref 64–87)
PO2 BLDA: 71 MMHG (ref 64–87)
PO2 BLDA: 74 MMHG (ref 64–87)
PO2 BLDA: 74 MMHG (ref 64–87)
PO2 BLDA: 80 MMHG (ref 64–87)
PO2 BLDA: 84 MMHG (ref 64–87)
PO2 BLDA: 99 MMHG (ref 64–87)
PO2 BLDMV: 29.7 MMHG
PO2 BLDV: 29.6 MMHG (ref 25–40)
PO2 BLDV: 45 MMHG (ref 25–40)
PO2 TEMP ADJ BLDA: 104 MMHG (ref 64–87)
PO2 TEMP ADJ BLDA: 112 MMHG (ref 64–87)
PO2 TEMP ADJ BLDA: 129 MMHG (ref 64–87)
PO2 TEMP ADJ BLDA: 200 MMHG (ref 64–87)
PO2 TEMP ADJ BLDA: 341 MMHG (ref 64–87)
PO2 TEMP ADJ BLDA: 67 MMHG (ref 64–87)
PO2 TEMP ADJ BLDA: 73 MMHG (ref 64–87)
PO2 TEMP ADJ BLDA: 77 MMHG (ref 64–87)
PO2 TEMP ADJ BLDA: 84 MMHG (ref 64–87)
PO2 TEMP ADJ BLDV: 48 MMHG (ref 25–40)
POTASSIUM BLD-SCNC: 3.8 MMOL/L (ref 3.6–5.5)
POTASSIUM BLD-SCNC: 4.8 MMOL/L (ref 3.6–5.5)
POTASSIUM BLD-SCNC: 5.7 MMOL/L (ref 3.6–5.5)
POTASSIUM BLD-SCNC: 5.8 MMOL/L (ref 3.6–5.5)
POTASSIUM SERPL-SCNC: 3 MMOL/L (ref 3.6–5.5)
POTASSIUM SERPL-SCNC: 3.2 MMOL/L (ref 3.6–5.5)
POTASSIUM SERPL-SCNC: 3.3 MMOL/L (ref 3.6–5.5)
POTASSIUM SERPL-SCNC: 3.5 MMOL/L (ref 3.6–5.5)
POTASSIUM SERPL-SCNC: 3.5 MMOL/L (ref 3.6–5.5)
POTASSIUM SERPL-SCNC: 3.7 MMOL/L (ref 3.6–5.5)
POTASSIUM SERPL-SCNC: 3.8 MMOL/L (ref 3.6–5.5)
POTASSIUM SERPL-SCNC: 3.9 MMOL/L (ref 3.6–5.5)
POTASSIUM SERPL-SCNC: 4 MMOL/L (ref 3.6–5.5)
POTASSIUM SERPL-SCNC: 4.2 MMOL/L (ref 3.6–5.5)
POTASSIUM SERPL-SCNC: 4.7 MMOL/L (ref 3.6–5.5)
POTASSIUM SERPL-SCNC: 4.7 MMOL/L (ref 3.6–5.5)
POTASSIUM SERPL-SCNC: 5.1 MMOL/L (ref 3.6–5.5)
POTASSIUM SERPL-SCNC: 5.3 MMOL/L (ref 3.6–5.5)
PROCALCITONIN SERPL-MCNC: 0.14 NG/ML
PRODUCT TYPE UPROD: NORMAL
PROT SERPL-MCNC: 4.8 G/DL (ref 6–8.2)
PROT SERPL-MCNC: 5.1 G/DL (ref 6–8.2)
PROT SERPL-MCNC: 5.2 G/DL (ref 6–8.2)
PROT SERPL-MCNC: 5.3 G/DL (ref 6–8.2)
PROT SERPL-MCNC: 5.3 G/DL (ref 6–8.2)
PROT SERPL-MCNC: 5.7 G/DL (ref 6–8.2)
PROT SERPL-MCNC: 6.2 G/DL (ref 6–8.2)
PROTHROMBIN TIME: 14.8 SEC (ref 12–14.6)
PROTHROMBIN TIME: 20.3 SEC (ref 12–14.6)
RBC # BLD AUTO: 2.14 M/UL (ref 4.7–6.1)
RBC # BLD AUTO: 2.23 M/UL (ref 4.7–6.1)
RBC # BLD AUTO: 2.31 M/UL (ref 4.7–6.1)
RBC # BLD AUTO: 2.4 M/UL (ref 4.7–6.1)
RBC # BLD AUTO: 2.42 M/UL (ref 4.7–6.1)
RBC # BLD AUTO: 2.52 M/UL (ref 4.7–6.1)
RBC # BLD AUTO: 2.52 M/UL (ref 4.7–6.1)
RBC # BLD AUTO: 2.56 M/UL (ref 4.7–6.1)
RBC # BLD AUTO: 2.71 M/UL (ref 4.7–6.1)
RBC # BLD AUTO: 2.94 M/UL (ref 4.7–6.1)
RBC # BLD AUTO: 2.98 M/UL (ref 4.7–6.1)
RBC # BLD AUTO: 3.89 M/UL (ref 4.7–6.1)
RBC # BLD AUTO: 4.65 M/UL (ref 4.7–6.1)
RH BLD: NORMAL
RH BLD: NORMAL
SAO2 % BLDA: 100 % (ref 93–99)
SAO2 % BLDA: 100 % (ref 93–99)
SAO2 % BLDA: 83 % (ref 93–99)
SAO2 % BLDA: 85 % (ref 93–99)
SAO2 % BLDA: 90 % (ref 93–99)
SAO2 % BLDA: 93 % (ref 93–99)
SAO2 % BLDA: 93 % (ref 93–99)
SAO2 % BLDA: 94 % (ref 93–99)
SAO2 % BLDA: 95 % (ref 93–99)
SAO2 % BLDA: 97 % (ref 93–99)
SAO2 % BLDA: 98 % (ref 93–99)
SAO2 % BLDMV: 50.5 %
SAO2 % BLDV: 49 %
SAO2 % BLDV: 81 %
SARS-COV-2 RDRP RESP QL NAA+PROBE: NOTDETECTED
SARS-COV-2 RNA RESP QL NAA+PROBE: NOTDETECTED
SIGNIFICANT IND 70042: NORMAL
SITE SITE: NORMAL
SODIUM BLD-SCNC: 141 MMOL/L (ref 135–145)
SODIUM BLD-SCNC: 146 MMOL/L (ref 135–145)
SODIUM BLD-SCNC: 148 MMOL/L (ref 135–145)
SODIUM BLD-SCNC: 159 MMOL/L (ref 135–145)
SODIUM SERPL-SCNC: 135 MMOL/L (ref 135–145)
SODIUM SERPL-SCNC: 137 MMOL/L (ref 135–145)
SODIUM SERPL-SCNC: 138 MMOL/L (ref 135–145)
SODIUM SERPL-SCNC: 138 MMOL/L (ref 135–145)
SODIUM SERPL-SCNC: 139 MMOL/L (ref 135–145)
SODIUM SERPL-SCNC: 140 MMOL/L (ref 135–145)
SODIUM SERPL-SCNC: 140 MMOL/L (ref 135–145)
SODIUM SERPL-SCNC: 141 MMOL/L (ref 135–145)
SODIUM SERPL-SCNC: 141 MMOL/L (ref 135–145)
SODIUM SERPL-SCNC: 142 MMOL/L (ref 135–145)
SODIUM SERPL-SCNC: 144 MMOL/L (ref 135–145)
SODIUM SERPL-SCNC: 145 MMOL/L (ref 135–145)
SODIUM SERPL-SCNC: 150 MMOL/L (ref 135–145)
SOURCE SOURCE: NORMAL
SPECIMEN DRAWN FROM PATIENT: ABNORMAL
SPECIMEN DRAWN FROM PATIENT: NORMAL
SPECIMEN SOURCE: NORMAL
SPECIMEN SOURCE: NORMAL
TEG ALGORITHM TGALG: ABNORMAL
TRIGL SERPL-MCNC: 125 MG/DL (ref 0–149)
TRIGL SERPL-MCNC: 132 MG/DL (ref 0–149)
TROPONIN T SERPL-MCNC: 2068 NG/L (ref 6–19)
TROPONIN T SERPL-MCNC: 2221 NG/L (ref 6–19)
TROPONIN T SERPL-MCNC: 2436 NG/L (ref 6–19)
TROPONIN T SERPL-MCNC: 265 NG/L (ref 6–19)
TROPONIN T SERPL-MCNC: 534 NG/L (ref 6–19)
UNIT STATUS USTAT: NORMAL
WBC # BLD AUTO: 10.1 K/UL (ref 4.8–10.8)
WBC # BLD AUTO: 11.3 K/UL (ref 4.8–10.8)
WBC # BLD AUTO: 13.6 K/UL (ref 4.8–10.8)
WBC # BLD AUTO: 13.6 K/UL (ref 4.8–10.8)
WBC # BLD AUTO: 14 K/UL (ref 4.8–10.8)
WBC # BLD AUTO: 14.7 K/UL (ref 4.8–10.8)
WBC # BLD AUTO: 15.5 K/UL (ref 4.8–10.8)
WBC # BLD AUTO: 15.7 K/UL (ref 4.8–10.8)
WBC # BLD AUTO: 16 K/UL (ref 4.8–10.8)
WBC # BLD AUTO: 16.2 K/UL (ref 4.8–10.8)
WBC # BLD AUTO: 16.9 K/UL (ref 4.8–10.8)
WBC # BLD AUTO: 17.1 K/UL (ref 4.8–10.8)
WBC # BLD AUTO: 24.4 K/UL (ref 4.8–10.8)

## 2020-01-01 PROCEDURE — 94640 AIRWAY INHALATION TREATMENT: CPT

## 2020-01-01 PROCEDURE — 84484 ASSAY OF TROPONIN QUANT: CPT

## 2020-01-01 PROCEDURE — A9270 NON-COVERED ITEM OR SERVICE: HCPCS | Performed by: INTERNAL MEDICINE

## 2020-01-01 PROCEDURE — 700105 HCHG RX REV CODE 258: Performed by: INTERNAL MEDICINE

## 2020-01-01 PROCEDURE — 700101 HCHG RX REV CODE 250: Performed by: INTERNAL MEDICINE

## 2020-01-01 PROCEDURE — 94003 VENT MGMT INPAT SUBQ DAY: CPT

## 2020-01-01 PROCEDURE — 93010 ELECTROCARDIOGRAM REPORT: CPT | Performed by: INTERNAL MEDICINE

## 2020-01-01 PROCEDURE — 501837 HCHG SUTURE CV: Performed by: SURGERY

## 2020-01-01 PROCEDURE — 87641 MR-STAPH DNA AMP PROBE: CPT

## 2020-01-01 PROCEDURE — 93005 ELECTROCARDIOGRAM TRACING: CPT | Performed by: INTERNAL MEDICINE

## 2020-01-01 PROCEDURE — 80048 BASIC METABOLIC PNL TOTAL CA: CPT

## 2020-01-01 PROCEDURE — 700102 HCHG RX REV CODE 250 W/ 637 OVERRIDE(OP): Performed by: INTERNAL MEDICINE

## 2020-01-01 PROCEDURE — 31500 INSERT EMERGENCY AIRWAY: CPT

## 2020-01-01 PROCEDURE — 5A2204Z RESTORATION OF CARDIAC RHYTHM, SINGLE: ICD-10-PCS | Performed by: INTERNAL MEDICINE

## 2020-01-01 PROCEDURE — 700105 HCHG RX REV CODE 258: Performed by: SURGERY

## 2020-01-01 PROCEDURE — 31645 BRNCHSC W/THER ASPIR 1ST: CPT | Performed by: INTERNAL MEDICINE

## 2020-01-01 PROCEDURE — 85027 COMPLETE CBC AUTOMATED: CPT

## 2020-01-01 PROCEDURE — 36620 INSERTION CATHETER ARTERY: CPT | Performed by: INTERNAL MEDICINE

## 2020-01-01 PROCEDURE — 83605 ASSAY OF LACTIC ACID: CPT

## 2020-01-01 PROCEDURE — 700111 HCHG RX REV CODE 636 W/ 250 OVERRIDE (IP): Performed by: INTERNAL MEDICINE

## 2020-01-01 PROCEDURE — 160029 HCHG SURGERY MINUTES - 1ST 30 MINS LEVEL 4: Performed by: SURGERY

## 2020-01-01 PROCEDURE — 302214 HCHG STAT INTUBATION BOX: Performed by: INTERNAL MEDICINE

## 2020-01-01 PROCEDURE — 80053 COMPREHEN METABOLIC PANEL: CPT

## 2020-01-01 PROCEDURE — 700111 HCHG RX REV CODE 636 W/ 250 OVERRIDE (IP): Performed by: ANESTHESIOLOGY

## 2020-01-01 PROCEDURE — 700111 HCHG RX REV CODE 636 W/ 250 OVERRIDE (IP)

## 2020-01-01 PROCEDURE — 4A133B3 MONITORING OF ARTERIAL PRESSURE, PULMONARY, PERCUTANEOUS APPROACH: ICD-10-PCS | Performed by: INTERNAL MEDICINE

## 2020-01-01 PROCEDURE — 82803 BLOOD GASES ANY COMBINATION: CPT

## 2020-01-01 PROCEDURE — 85025 COMPLETE CBC W/AUTO DIFF WBC: CPT

## 2020-01-01 PROCEDURE — 82803 BLOOD GASES ANY COMBINATION: CPT | Mod: 91

## 2020-01-01 PROCEDURE — A9270 NON-COVERED ITEM OR SERVICE: HCPCS

## 2020-01-01 PROCEDURE — 86923 COMPATIBILITY TEST ELECTRIC: CPT

## 2020-01-01 PROCEDURE — 31500 INSERT EMERGENCY AIRWAY: CPT | Performed by: INTERNAL MEDICINE

## 2020-01-01 PROCEDURE — B410ZZZ FLUOROSCOPY OF ABDOMINAL AORTA: ICD-10-PCS | Performed by: SURGERY

## 2020-01-01 PROCEDURE — C1894 INTRO/SHEATH, NON-LASER: HCPCS | Performed by: SURGERY

## 2020-01-01 PROCEDURE — 302977 HCHG BRONCHOSCOPY PROC-THERAPEUTIC

## 2020-01-01 PROCEDURE — B41CZZZ FLUOROSCOPY OF PELVIC ARTERIES: ICD-10-PCS | Performed by: INTERNAL MEDICINE

## 2020-01-01 PROCEDURE — 83880 ASSAY OF NATRIURETIC PEPTIDE: CPT | Mod: 91

## 2020-01-01 PROCEDURE — 82962 GLUCOSE BLOOD TEST: CPT

## 2020-01-01 PROCEDURE — 700105 HCHG RX REV CODE 258: Performed by: ANESTHESIOLOGY

## 2020-01-01 PROCEDURE — 71045 X-RAY EXAM CHEST 1 VIEW: CPT

## 2020-01-01 PROCEDURE — 86140 C-REACTIVE PROTEIN: CPT

## 2020-01-01 PROCEDURE — C1760 CLOSURE DEV, VASC: HCPCS

## 2020-01-01 PROCEDURE — 74018 RADEX ABDOMEN 1 VIEW: CPT

## 2020-01-01 PROCEDURE — A6403 STERILE GAUZE>16 <= 48 SQ IN: HCPCS | Performed by: SURGERY

## 2020-01-01 PROCEDURE — 33990 INSJ PERQ VAD L HRT ARTERIAL: CPT | Mod: 51 | Performed by: INTERNAL MEDICINE

## 2020-01-01 PROCEDURE — 92950 HEART/LUNG RESUSCITATION CPR: CPT

## 2020-01-01 PROCEDURE — 99153 MOD SED SAME PHYS/QHP EA: CPT

## 2020-01-01 PROCEDURE — 85610 PROTHROMBIN TIME: CPT

## 2020-01-01 PROCEDURE — C1768 GRAFT, VASCULAR: HCPCS | Performed by: SURGERY

## 2020-01-01 PROCEDURE — C9803 HOPD COVID-19 SPEC COLLECT: HCPCS | Performed by: EMERGENCY MEDICINE

## 2020-01-01 PROCEDURE — 93306 TTE W/DOPPLER COMPLETE: CPT

## 2020-01-01 PROCEDURE — 83735 ASSAY OF MAGNESIUM: CPT

## 2020-01-01 PROCEDURE — 94770 HCHG CO2 EXPIRED GAS DETERMINATION: CPT

## 2020-01-01 PROCEDURE — 700101 HCHG RX REV CODE 250

## 2020-01-01 PROCEDURE — 85730 THROMBOPLASTIN TIME PARTIAL: CPT | Mod: 91

## 2020-01-01 PROCEDURE — 93308 TTE F-UP OR LMTD: CPT

## 2020-01-01 PROCEDURE — 700111 HCHG RX REV CODE 636 W/ 250 OVERRIDE (IP): Performed by: SURGERY

## 2020-01-01 PROCEDURE — 37799 UNLISTED PX VASCULAR SURGERY: CPT

## 2020-01-01 PROCEDURE — 99233 SBSQ HOSP IP/OBS HIGH 50: CPT | Performed by: INTERNAL MEDICINE

## 2020-01-01 PROCEDURE — 93454 CORONARY ARTERY ANGIO S&I: CPT | Mod: 26,59 | Performed by: INTERNAL MEDICINE

## 2020-01-01 PROCEDURE — 04JY3ZZ INSPECTION OF LOWER ARTERY, PERCUTANEOUS APPROACH: ICD-10-PCS | Performed by: SURGERY

## 2020-01-01 PROCEDURE — 86900 BLOOD TYPING SEROLOGIC ABO: CPT

## 2020-01-01 PROCEDURE — 99292 CRITICAL CARE ADDL 30 MIN: CPT | Mod: 25 | Performed by: INTERNAL MEDICINE

## 2020-01-01 PROCEDURE — 84478 ASSAY OF TRIGLYCERIDES: CPT

## 2020-01-01 PROCEDURE — 99291 CRITICAL CARE FIRST HOUR: CPT | Performed by: INTERNAL MEDICINE

## 2020-01-01 PROCEDURE — 770022 HCHG ROOM/CARE - ICU (200)

## 2020-01-01 PROCEDURE — 85576 BLOOD PLATELET AGGREGATION: CPT

## 2020-01-01 PROCEDURE — 700102 HCHG RX REV CODE 250 W/ 637 OVERRIDE(OP)

## 2020-01-01 PROCEDURE — 5A12012 PERFORMANCE OF CARDIAC OUTPUT, SINGLE, MANUAL: ICD-10-PCS | Performed by: INTERNAL MEDICINE

## 2020-01-01 PROCEDURE — 0BJ08ZZ INSPECTION OF TRACHEOBRONCHIAL TREE, VIA NATURAL OR ARTIFICIAL OPENING ENDOSCOPIC: ICD-10-PCS | Performed by: INTERNAL MEDICINE

## 2020-01-01 PROCEDURE — 99291 CRITICAL CARE FIRST HOUR: CPT | Mod: 25 | Performed by: INTERNAL MEDICINE

## 2020-01-01 PROCEDURE — 88311 DECALCIFY TISSUE: CPT

## 2020-01-01 PROCEDURE — 36430 TRANSFUSION BLD/BLD COMPNT: CPT

## 2020-01-01 PROCEDURE — 302151 K-PAD 14X20: Performed by: INTERNAL MEDICINE

## 2020-01-01 PROCEDURE — 96365 THER/PROPH/DIAG IV INF INIT: CPT

## 2020-01-01 PROCEDURE — 02HV33Z INSERTION OF INFUSION DEVICE INTO SUPERIOR VENA CAVA, PERCUTANEOUS APPROACH: ICD-10-PCS | Performed by: INTERNAL MEDICINE

## 2020-01-01 PROCEDURE — 33992 RMVL PERQ LEFT HEART VAD: CPT | Performed by: INTERNAL MEDICINE

## 2020-01-01 PROCEDURE — 84132 ASSAY OF SERUM POTASSIUM: CPT

## 2020-01-01 PROCEDURE — 30233N1 TRANSFUSION OF NONAUTOLOGOUS RED BLOOD CELLS INTO PERIPHERAL VEIN, PERCUTANEOUS APPROACH: ICD-10-PCS | Performed by: INTERNAL MEDICINE

## 2020-01-01 PROCEDURE — 02HA3RZ INSERTION OF SHORT-TERM EXTERNAL HEART ASSIST SYSTEM INTO HEART, PERCUTANEOUS APPROACH: ICD-10-PCS | Performed by: INTERNAL MEDICINE

## 2020-01-01 PROCEDURE — 93925 LOWER EXTREMITY STUDY: CPT

## 2020-01-01 PROCEDURE — 93308 TTE F-UP OR LMTD: CPT | Mod: 26,76 | Performed by: INTERNAL MEDICINE

## 2020-01-01 PROCEDURE — 32555 ASPIRATE PLEURA W/ IMAGING: CPT | Mod: RT

## 2020-01-01 PROCEDURE — 85347 COAGULATION TIME ACTIVATED: CPT

## 2020-01-01 PROCEDURE — C9803 HOPD COVID-19 SPEC COLLECT: HCPCS | Performed by: SURGERY

## 2020-01-01 PROCEDURE — 94760 N-INVAS EAR/PLS OXIMETRY 1: CPT

## 2020-01-01 PROCEDURE — C1757 CATH, THROMBECTOMY/EMBOLECT: HCPCS | Performed by: SURGERY

## 2020-01-01 PROCEDURE — 160009 HCHG ANES TIME/MIN: Performed by: SURGERY

## 2020-01-01 PROCEDURE — 92950 HEART/LUNG RESUSCITATION CPR: CPT | Performed by: INTERNAL MEDICINE

## 2020-01-01 PROCEDURE — 85014 HEMATOCRIT: CPT | Mod: 91

## 2020-01-01 PROCEDURE — 700105 HCHG RX REV CODE 258

## 2020-01-01 PROCEDURE — 85347 COAGULATION TIME ACTIVATED: CPT | Mod: 91

## 2020-01-01 PROCEDURE — 99202 OFFICE O/P NEW SF 15 MIN: CPT | Mod: CR | Performed by: INTERNAL MEDICINE

## 2020-01-01 PROCEDURE — 5A1945Z RESPIRATORY VENTILATION, 24-96 CONSECUTIVE HOURS: ICD-10-PCS | Performed by: INTERNAL MEDICINE

## 2020-01-01 PROCEDURE — 85730 THROMBOPLASTIN TIME PARTIAL: CPT

## 2020-01-01 PROCEDURE — P9016 RBC LEUKOCYTES REDUCED: HCPCS

## 2020-01-01 PROCEDURE — 02703FZ DILATION OF CORONARY ARTERY, ONE ARTERY WITH THREE INTRALUMINAL DEVICES, PERCUTANEOUS APPROACH: ICD-10-PCS | Performed by: INTERNAL MEDICINE

## 2020-01-01 PROCEDURE — B2111ZZ FLUOROSCOPY OF MULTIPLE CORONARY ARTERIES USING LOW OSMOLAR CONTRAST: ICD-10-PCS | Performed by: INTERNAL MEDICINE

## 2020-01-01 PROCEDURE — 700111 HCHG RX REV CODE 636 W/ 250 OVERRIDE (IP): Performed by: PSYCHIATRY & NEUROLOGY

## 2020-01-01 PROCEDURE — 02PA3RZ REMOVAL OF SHORT-TERM EXTERNAL HEART ASSIST SYSTEM FROM HEART, PERCUTANEOUS APPROACH: ICD-10-PCS | Performed by: INTERNAL MEDICINE

## 2020-01-01 PROCEDURE — 500881 HCHG PACK, EXTREMITY: Performed by: SURGERY

## 2020-01-01 PROCEDURE — 85018 HEMOGLOBIN: CPT

## 2020-01-01 PROCEDURE — 94002 VENT MGMT INPAT INIT DAY: CPT

## 2020-01-01 PROCEDURE — 88300 SURGICAL PATH GROSS: CPT

## 2020-01-01 PROCEDURE — 501838 HCHG SUTURE GENERAL: Performed by: SURGERY

## 2020-01-01 PROCEDURE — C1876 STENT, NON-COA/NON-COV W/DEL: HCPCS | Performed by: SURGERY

## 2020-01-01 PROCEDURE — 82962 GLUCOSE BLOOD TEST: CPT | Mod: 91

## 2020-01-01 PROCEDURE — 87040 BLOOD CULTURE FOR BACTERIA: CPT

## 2020-01-01 PROCEDURE — 92941 PRQ TRLML REVSC TOT OCCL AMI: CPT | Mod: LD | Performed by: INTERNAL MEDICINE

## 2020-01-01 PROCEDURE — 84100 ASSAY OF PHOSPHORUS: CPT

## 2020-01-01 PROCEDURE — 86901 BLOOD TYPING SEROLOGIC RH(D): CPT

## 2020-01-01 PROCEDURE — 93926 LOWER EXTREMITY STUDY: CPT | Mod: LT

## 2020-01-01 PROCEDURE — 94150 VITAL CAPACITY TEST: CPT

## 2020-01-01 PROCEDURE — 99232 SBSQ HOSP IP/OBS MODERATE 35: CPT | Performed by: INTERNAL MEDICINE

## 2020-01-01 PROCEDURE — 700117 HCHG RX CONTRAST REV CODE 255: Performed by: SURGERY

## 2020-01-01 PROCEDURE — C1751 CATH, INF, PER/CENT/MIDLINE: HCPCS

## 2020-01-01 PROCEDURE — 04CY0ZZ EXTIRPATION OF MATTER FROM LOWER ARTERY, OPEN APPROACH: ICD-10-PCS | Performed by: SURGERY

## 2020-01-01 PROCEDURE — 74176 CT ABD & PELVIS W/O CONTRAST: CPT

## 2020-01-01 PROCEDURE — 82330 ASSAY OF CALCIUM: CPT

## 2020-01-01 PROCEDURE — P9047 ALBUMIN (HUMAN), 25%, 50ML: HCPCS

## 2020-01-01 PROCEDURE — 33992 RMVL PERQ LEFT HEART VAD: CPT

## 2020-01-01 PROCEDURE — 99152 MOD SED SAME PHYS/QHP 5/>YRS: CPT

## 2020-01-01 PROCEDURE — 5A1955Z RESPIRATORY VENTILATION, GREATER THAN 96 CONSECUTIVE HOURS: ICD-10-PCS | Performed by: EMERGENCY MEDICINE

## 2020-01-01 PROCEDURE — 700101 HCHG RX REV CODE 250: Performed by: ANESTHESIOLOGY

## 2020-01-01 PROCEDURE — 04CL0ZZ EXTIRPATION OF MATTER FROM LEFT FEMORAL ARTERY, OPEN APPROACH: ICD-10-PCS | Performed by: SURGERY

## 2020-01-01 PROCEDURE — 99292 CRITICAL CARE ADDL 30 MIN: CPT | Performed by: INTERNAL MEDICINE

## 2020-01-01 PROCEDURE — 160041 HCHG SURGERY MINUTES - EA ADDL 1 MIN LEVEL 4: Performed by: SURGERY

## 2020-01-01 PROCEDURE — 93503 INSERT/PLACE HEART CATHETER: CPT | Mod: 59 | Performed by: INTERNAL MEDICINE

## 2020-01-01 PROCEDURE — B2131ZZ FLUOROSCOPY OF MULTIPLE CORONARY ARTERY BYPASS GRAFTS USING LOW OSMOLAR CONTRAST: ICD-10-PCS | Performed by: INTERNAL MEDICINE

## 2020-01-01 PROCEDURE — 36600 WITHDRAWAL OF ARTERIAL BLOOD: CPT

## 2020-01-01 PROCEDURE — U0003 INFECTIOUS AGENT DETECTION BY NUCLEIC ACID (DNA OR RNA); SEVERE ACUTE RESPIRATORY SYNDROME CORONAVIRUS 2 (SARS-COV-2) (CORONAVIRUS DISEASE [COVID-19]), AMPLIFIED PROBE TECHNIQUE, MAKING USE OF HIGH THROUGHPUT TECHNOLOGIES AS DESCRIBED BY CMS-2020-01-R: HCPCS

## 2020-01-01 PROCEDURE — 160048 HCHG OR STATISTICAL LEVEL 1-5: Performed by: SURGERY

## 2020-01-01 PROCEDURE — 700117 HCHG RX CONTRAST REV CODE 255: Performed by: INTERNAL MEDICINE

## 2020-01-01 PROCEDURE — 99152 MOD SED SAME PHYS/QHP 5/>YRS: CPT | Performed by: INTERNAL MEDICINE

## 2020-01-01 PROCEDURE — 0B21XEZ CHANGE ENDOTRACHEAL AIRWAY IN TRACHEA, EXTERNAL APPROACH: ICD-10-PCS | Performed by: INTERNAL MEDICINE

## 2020-01-01 PROCEDURE — C1769 GUIDE WIRE: HCPCS | Performed by: SURGERY

## 2020-01-01 PROCEDURE — 700117 HCHG RX CONTRAST REV CODE 255: Performed by: EMERGENCY MEDICINE

## 2020-01-01 PROCEDURE — 041K0JJ BYPASS RIGHT FEMORAL ARTERY TO LEFT FEMORAL ARTERY WITH SYNTHETIC SUBSTITUTE, OPEN APPROACH: ICD-10-PCS | Performed by: INTERNAL MEDICINE

## 2020-01-01 PROCEDURE — 0BH18EZ INSERTION OF ENDOTRACHEAL AIRWAY INTO TRACHEA, VIA NATURAL OR ARTIFICIAL OPENING ENDOSCOPIC: ICD-10-PCS | Performed by: INTERNAL MEDICINE

## 2020-01-01 PROCEDURE — 5A0221D ASSISTANCE WITH CARDIAC OUTPUT USING IMPELLER PUMP, CONTINUOUS: ICD-10-PCS | Performed by: INTERNAL MEDICINE

## 2020-01-01 PROCEDURE — 36140 INTRO NDL ICATH UPR/LXTR ART: CPT

## 2020-01-01 PROCEDURE — 86850 RBC ANTIBODY SCREEN: CPT

## 2020-01-01 PROCEDURE — 85014 HEMATOCRIT: CPT

## 2020-01-01 PROCEDURE — 0Y6G0ZZ DETACHMENT AT LEFT KNEE REGION, OPEN APPROACH: ICD-10-PCS | Performed by: SURGERY

## 2020-01-01 PROCEDURE — 92960 CARDIOVERSION ELECTRIC EXT: CPT

## 2020-01-01 PROCEDURE — 04UL0KZ SUPPLEMENT LEFT FEMORAL ARTERY WITH NONAUTOLOGOUS TISSUE SUBSTITUTE, OPEN APPROACH: ICD-10-PCS | Performed by: SURGERY

## 2020-01-01 PROCEDURE — 88307 TISSUE EXAM BY PATHOLOGIST: CPT

## 2020-01-01 PROCEDURE — 84132 ASSAY OF SERUM POTASSIUM: CPT | Mod: 91

## 2020-01-01 PROCEDURE — 04CJ0ZZ EXTIRPATION OF MATTER FROM LEFT EXTERNAL ILIAC ARTERY, OPEN APPROACH: ICD-10-PCS | Performed by: SURGERY

## 2020-01-01 PROCEDURE — 82947 ASSAY GLUCOSE BLOOD QUANT: CPT | Mod: 91

## 2020-01-01 PROCEDURE — B548ZZA ULTRASONOGRAPHY OF SUPERIOR VENA CAVA, GUIDANCE: ICD-10-PCS | Performed by: INTERNAL MEDICINE

## 2020-01-01 PROCEDURE — 84295 ASSAY OF SERUM SODIUM: CPT | Mod: 91

## 2020-01-01 PROCEDURE — 93306 TTE W/DOPPLER COMPLETE: CPT | Mod: 26 | Performed by: INTERNAL MEDICINE

## 2020-01-01 PROCEDURE — 93308 TTE F-UP OR LMTD: CPT | Mod: 26 | Performed by: INTERNAL MEDICINE

## 2020-01-01 PROCEDURE — 84145 PROCALCITONIN (PCT): CPT

## 2020-01-01 PROCEDURE — 85384 FIBRINOGEN ACTIVITY: CPT | Mod: 91

## 2020-01-01 PROCEDURE — 36415 COLL VENOUS BLD VENIPUNCTURE: CPT

## 2020-01-01 PROCEDURE — 93503 INSERT/PLACE HEART CATHETER: CPT

## 2020-01-01 PROCEDURE — 99291 CRITICAL CARE FIRST HOUR: CPT

## 2020-01-01 PROCEDURE — U0004 COV-19 TEST NON-CDC HGH THRU: HCPCS

## 2020-01-01 PROCEDURE — 700111 HCHG RX REV CODE 636 W/ 250 OVERRIDE (IP): Performed by: EMERGENCY MEDICINE

## 2020-01-01 PROCEDURE — 83605 ASSAY OF LACTIC ACID: CPT | Mod: 91

## 2020-01-01 PROCEDURE — 85384 FIBRINOGEN ACTIVITY: CPT

## 2020-01-01 PROCEDURE — 501445 HCHG STAPLER, SKIN DISP: Performed by: SURGERY

## 2020-01-01 PROCEDURE — 306651 CL-LEFT HEART CATHETERIZATION WITH POSSIBLE INTERVENTION

## 2020-01-01 PROCEDURE — B41GZZZ FLUOROSCOPY OF LEFT LOWER EXTREMITY ARTERIES: ICD-10-PCS | Performed by: INTERNAL MEDICINE

## 2020-01-01 PROCEDURE — B41GZZZ FLUOROSCOPY OF LEFT LOWER EXTREMITY ARTERIES: ICD-10-PCS | Performed by: SURGERY

## 2020-01-01 PROCEDURE — 93005 ELECTROCARDIOGRAM TRACING: CPT | Performed by: EMERGENCY MEDICINE

## 2020-01-01 PROCEDURE — 96368 THER/DIAG CONCURRENT INF: CPT

## 2020-01-01 PROCEDURE — 047J3EZ DILATION OF LEFT EXTERNAL ILIAC ARTERY WITH TWO INTRALUMINAL DEVICES, PERCUTANEOUS APPROACH: ICD-10-PCS | Performed by: SURGERY

## 2020-01-01 DEVICE — IMPLANTABLE DEVICE: Type: IMPLANTABLE DEVICE | Status: FUNCTIONAL

## 2020-01-01 DEVICE — PATCH .8X8CM XENOSURE BIOLOGIC VASCULAR---ORDER IN MULTIPLES OF 5---: Type: IMPLANTABLE DEVICE | Status: FUNCTIONAL

## 2020-01-01 RX ORDER — SODIUM CHLORIDE 9 MG/ML
250 INJECTION, SOLUTION INTRAVENOUS ONCE
Status: COMPLETED | OUTPATIENT
Start: 2020-01-01 | End: 2020-01-01

## 2020-01-01 RX ORDER — ACETAMINOPHEN 500 MG
1000 TABLET ORAL ONCE
Status: CANCELLED | OUTPATIENT
Start: 2020-01-01 | End: 2020-01-01

## 2020-01-01 RX ORDER — MIDAZOLAM HYDROCHLORIDE 1 MG/ML
INJECTION INTRAMUSCULAR; INTRAVENOUS
Status: COMPLETED
Start: 2020-01-01 | End: 2020-01-01

## 2020-01-01 RX ORDER — POTASSIUM CHLORIDE 29.8 MG/ML
40 INJECTION INTRAVENOUS ONCE
Status: COMPLETED | OUTPATIENT
Start: 2020-01-01 | End: 2020-01-01

## 2020-01-01 RX ORDER — HEPARIN SODIUM 5000 [USP'U]/100ML
INJECTION, SOLUTION INTRAVENOUS CONTINUOUS
Status: DISCONTINUED | OUTPATIENT
Start: 2020-01-01 | End: 2020-01-01

## 2020-01-01 RX ORDER — CELECOXIB 200 MG/1
200 CAPSULE ORAL 2 TIMES DAILY
COMMUNITY
Start: 2020-01-01

## 2020-01-01 RX ORDER — LIDOCAINE HYDROCHLORIDE 20 MG/ML
INJECTION, SOLUTION INFILTRATION; PERINEURAL
Status: COMPLETED
Start: 2020-01-01 | End: 2020-01-01

## 2020-01-01 RX ORDER — LORAZEPAM 2 MG/ML
2 INJECTION INTRAMUSCULAR EVERY 4 HOURS PRN
Status: DISCONTINUED | OUTPATIENT
Start: 2020-01-01 | End: 2020-01-01

## 2020-01-01 RX ORDER — IPRATROPIUM BROMIDE AND ALBUTEROL SULFATE 2.5; .5 MG/3ML; MG/3ML
3 SOLUTION RESPIRATORY (INHALATION)
Status: DISCONTINUED | OUTPATIENT
Start: 2020-01-01 | End: 2020-08-07 | Stop reason: HOSPADM

## 2020-01-01 RX ORDER — FAMOTIDINE 20 MG/1
20 TABLET, FILM COATED ORAL EVERY 12 HOURS
Status: DISCONTINUED | OUTPATIENT
Start: 2020-01-01 | End: 2020-01-01

## 2020-01-01 RX ORDER — SODIUM CHLORIDE 9 MG/ML
INJECTION, SOLUTION INTRAVENOUS
Status: DISCONTINUED
Start: 2020-01-01 | End: 2020-08-07 | Stop reason: HOSPADM

## 2020-01-01 RX ORDER — MAGNESIUM SULFATE HEPTAHYDRATE 40 MG/ML
4 INJECTION, SOLUTION INTRAVENOUS ONCE
Status: COMPLETED | OUTPATIENT
Start: 2020-01-01 | End: 2020-01-01

## 2020-01-01 RX ORDER — ACETAMINOPHEN 325 MG/1
650 TABLET ORAL EVERY 6 HOURS PRN
Status: DISCONTINUED | OUTPATIENT
Start: 2020-01-01 | End: 2020-01-01

## 2020-01-01 RX ORDER — ATORVASTATIN CALCIUM 40 MG/1
40 TABLET, FILM COATED ORAL EVERY EVENING
Status: DISCONTINUED | OUTPATIENT
Start: 2020-01-01 | End: 2020-01-01

## 2020-01-01 RX ORDER — CALCIUM CHLORIDE 100 MG/ML
1 INJECTION INTRAVENOUS; INTRAVENTRICULAR ONCE
Status: COMPLETED | OUTPATIENT
Start: 2020-01-01 | End: 2020-01-01

## 2020-01-01 RX ORDER — POLYETHYLENE GLYCOL 3350 17 G/17G
1 POWDER, FOR SOLUTION ORAL
Status: DISCONTINUED | OUTPATIENT
Start: 2020-01-01 | End: 2020-08-07 | Stop reason: HOSPADM

## 2020-01-01 RX ORDER — MORPHINE SULFATE 4 MG/ML
2-4 INJECTION, SOLUTION INTRAMUSCULAR; INTRAVENOUS
Status: DISCONTINUED | OUTPATIENT
Start: 2020-01-01 | End: 2020-01-01

## 2020-01-01 RX ORDER — HEPARIN SODIUM,PORCINE 1000/ML
VIAL (ML) INJECTION PRN
Status: DISCONTINUED | OUTPATIENT
Start: 2020-01-01 | End: 2020-01-01 | Stop reason: SURG

## 2020-01-01 RX ORDER — IBUPROFEN 800 MG/1
800 TABLET ORAL EVERY 8 HOURS PRN
COMMUNITY

## 2020-01-01 RX ORDER — MAGNESIUM SULFATE HEPTAHYDRATE 40 MG/ML
2 INJECTION, SOLUTION INTRAVENOUS ONCE
Status: DISCONTINUED | OUTPATIENT
Start: 2020-01-01 | End: 2020-01-01

## 2020-01-01 RX ORDER — POTASSIUM CHLORIDE 14.9 MG/ML
20 INJECTION INTRAVENOUS ONCE
Status: COMPLETED | OUTPATIENT
Start: 2020-01-01 | End: 2020-01-01

## 2020-01-01 RX ORDER — IODIXANOL 270 MG/ML
INJECTION, SOLUTION INTRAVASCULAR
Status: DISCONTINUED | OUTPATIENT
Start: 2020-01-01 | End: 2020-01-01 | Stop reason: HOSPADM

## 2020-01-01 RX ORDER — MORPHINE SULFATE 10 MG/ML
10 INJECTION, SOLUTION INTRAMUSCULAR; INTRAVENOUS
Status: DISCONTINUED | OUTPATIENT
Start: 2020-01-01 | End: 2020-08-07 | Stop reason: HOSPADM

## 2020-01-01 RX ORDER — THIAMINE MONONITRATE (VIT B1) 100 MG
100 TABLET ORAL 2 TIMES DAILY
Status: DISCONTINUED | OUTPATIENT
Start: 2020-01-01 | End: 2020-01-01

## 2020-01-01 RX ORDER — METOPROLOL TARTRATE 1 MG/ML
5 INJECTION, SOLUTION INTRAVENOUS
Status: DISCONTINUED | OUTPATIENT
Start: 2020-01-01 | End: 2020-01-01

## 2020-01-01 RX ORDER — SODIUM CHLORIDE, SODIUM LACTATE, POTASSIUM CHLORIDE, CALCIUM CHLORIDE 600; 310; 30; 20 MG/100ML; MG/100ML; MG/100ML; MG/100ML
INJECTION, SOLUTION INTRAVENOUS
Status: COMPLETED
Start: 2020-01-01 | End: 2020-01-01

## 2020-01-01 RX ORDER — CALCIUM CHLORIDE 100 MG/ML
INJECTION INTRAVENOUS; INTRAVENTRICULAR PRN
Status: DISCONTINUED | OUTPATIENT
Start: 2020-01-01 | End: 2020-01-01 | Stop reason: SURG

## 2020-01-01 RX ORDER — NITROGLYCERIN 20 MG/100ML
0-200 INJECTION INTRAVENOUS ONCE
Status: COMPLETED | OUTPATIENT
Start: 2020-01-01 | End: 2020-01-01

## 2020-01-01 RX ORDER — LIDOCAINE HYDROCHLORIDE 20 MG/ML
5 SOLUTION OROPHARYNGEAL PRN
Status: ACTIVE | OUTPATIENT
Start: 2020-01-01 | End: 2020-01-01

## 2020-01-01 RX ORDER — SODIUM CHLORIDE 9 MG/ML
INJECTION, SOLUTION INTRAVENOUS
Status: COMPLETED
Start: 2020-01-01 | End: 2020-01-01

## 2020-01-01 RX ORDER — ETOMIDATE 2 MG/ML
20 INJECTION INTRAVENOUS ONCE
Status: COMPLETED | OUTPATIENT
Start: 2020-01-01 | End: 2020-01-01

## 2020-01-01 RX ORDER — PHENYLEPHRINE HCL IN 0.9% NACL 0.5 MG/5ML
100-300 SYRINGE (ML) INTRAVENOUS
Status: DISCONTINUED | OUTPATIENT
Start: 2020-01-01 | End: 2020-01-01 | Stop reason: HOSPADM

## 2020-01-01 RX ORDER — HEPARIN SODIUM,PORCINE 1000/ML
VIAL (ML) INJECTION
Status: COMPLETED
Start: 2020-01-01 | End: 2020-01-01

## 2020-01-01 RX ORDER — CEFAZOLIN SODIUM 1 G/3ML
INJECTION, POWDER, FOR SOLUTION INTRAMUSCULAR; INTRAVENOUS PRN
Status: DISCONTINUED | OUTPATIENT
Start: 2020-01-01 | End: 2020-01-01 | Stop reason: SURG

## 2020-01-01 RX ORDER — HEPARIN SODIUM 200 [USP'U]/100ML
INJECTION, SOLUTION INTRAVENOUS
Status: COMPLETED
Start: 2020-01-01 | End: 2020-01-01

## 2020-01-01 RX ORDER — ATORVASTATIN CALCIUM 80 MG/1
80 TABLET, FILM COATED ORAL DAILY
COMMUNITY

## 2020-01-01 RX ORDER — SODIUM CHLORIDE 9 MG/ML
INJECTION, SOLUTION INTRAVENOUS
Status: ACTIVE
Start: 2020-01-01 | End: 2020-01-01

## 2020-01-01 RX ORDER — PHENYLEPHRINE HYDROCHLORIDE 10 MG/ML
INJECTION, SOLUTION INTRAMUSCULAR; INTRAVENOUS; SUBCUTANEOUS
Status: DISCONTINUED
Start: 2020-01-01 | End: 2020-08-07 | Stop reason: HOSPADM

## 2020-01-01 RX ORDER — SODIUM CHLORIDE 9 MG/ML
INJECTION, SOLUTION INTRAVENOUS CONTINUOUS
Status: DISPENSED | OUTPATIENT
Start: 2020-01-01 | End: 2020-01-01

## 2020-01-01 RX ORDER — SODIUM BICARBONATE IN D5W 150/1000ML
PLASTIC BAG, INJECTION (ML) INTRAVENOUS CONTINUOUS
Status: DISCONTINUED | OUTPATIENT
Start: 2020-01-01 | End: 2020-01-01

## 2020-01-01 RX ORDER — MAGNESIUM SULFATE HEPTAHYDRATE 40 MG/ML
2 INJECTION, SOLUTION INTRAVENOUS ONCE
Status: COMPLETED | OUTPATIENT
Start: 2020-01-01 | End: 2020-01-01

## 2020-01-01 RX ORDER — LINEZOLID 2 MG/ML
600 INJECTION, SOLUTION INTRAVENOUS EVERY 12 HOURS
Status: DISCONTINUED | OUTPATIENT
Start: 2020-01-01 | End: 2020-01-01

## 2020-01-01 RX ORDER — KETAMINE HYDROCHLORIDE 50 MG/ML
INJECTION, SOLUTION INTRAMUSCULAR; INTRAVENOUS
Status: COMPLETED
Start: 2020-01-01 | End: 2020-01-01

## 2020-01-01 RX ORDER — PHENYLEPHRINE HCL IN 0.9% NACL 0.5 MG/5ML
200 SYRINGE (ML) INTRAVENOUS
Status: COMPLETED | OUTPATIENT
Start: 2020-01-01 | End: 2020-01-01

## 2020-01-01 RX ORDER — EPINEPHRINE 1 MG/ML(1)
AMPUL (ML) INJECTION PRN
Status: DISCONTINUED | OUTPATIENT
Start: 2020-01-01 | End: 2020-01-01 | Stop reason: SURG

## 2020-01-01 RX ORDER — DEXMEDETOMIDINE HYDROCHLORIDE 4 UG/ML
.1-1.5 INJECTION, SOLUTION INTRAVENOUS CONTINUOUS
Status: DISCONTINUED | OUTPATIENT
Start: 2020-01-01 | End: 2020-01-01

## 2020-01-01 RX ORDER — POTASSIUM CHLORIDE 7.45 MG/ML
10 INJECTION INTRAVENOUS ONCE
Status: COMPLETED | OUTPATIENT
Start: 2020-01-01 | End: 2020-01-01

## 2020-01-01 RX ORDER — ONDANSETRON 2 MG/ML
4 INJECTION INTRAMUSCULAR; INTRAVENOUS EVERY 4 HOURS PRN
Status: DISCONTINUED | OUTPATIENT
Start: 2020-01-01 | End: 2020-08-07 | Stop reason: HOSPADM

## 2020-01-01 RX ORDER — KETAMINE HYDROCHLORIDE 50 MG/ML
200 INJECTION, SOLUTION INTRAMUSCULAR; INTRAVENOUS ONCE
Status: DISCONTINUED | OUTPATIENT
Start: 2020-01-01 | End: 2020-01-01

## 2020-01-01 RX ORDER — BISACODYL 10 MG
10 SUPPOSITORY, RECTAL RECTAL DAILY
Status: DISCONTINUED | OUTPATIENT
Start: 2020-01-01 | End: 2020-01-01

## 2020-01-01 RX ORDER — PHENYLEPHRINE HCL IN 0.9% NACL 0.5 MG/5ML
SYRINGE (ML) INTRAVENOUS
Status: COMPLETED
Start: 2020-01-01 | End: 2020-01-01

## 2020-01-01 RX ORDER — ATROPINE SULFATE 10 MG/ML
2 SOLUTION/ DROPS OPHTHALMIC EVERY 4 HOURS PRN
Status: DISCONTINUED | OUTPATIENT
Start: 2020-01-01 | End: 2020-08-07 | Stop reason: HOSPADM

## 2020-01-01 RX ORDER — SODIUM CHLORIDE, SODIUM LACTATE, POTASSIUM CHLORIDE, AND CALCIUM CHLORIDE .6; .31; .03; .02 G/100ML; G/100ML; G/100ML; G/100ML
1000 INJECTION, SOLUTION INTRAVENOUS ONCE
Status: COMPLETED | OUTPATIENT
Start: 2020-01-01 | End: 2020-01-01

## 2020-01-01 RX ORDER — OXYCODONE HYDROCHLORIDE 5 MG/1
5 TABLET ORAL EVERY 4 HOURS PRN
Status: DISCONTINUED | OUTPATIENT
Start: 2020-01-01 | End: 2020-08-07 | Stop reason: HOSPADM

## 2020-01-01 RX ORDER — POTASSIUM CHLORIDE 29.8 MG/ML
40 INJECTION INTRAVENOUS ONCE
Status: DISCONTINUED | OUTPATIENT
Start: 2020-01-01 | End: 2020-01-01

## 2020-01-01 RX ORDER — MORPHINE SULFATE 10 MG/ML
5 INJECTION, SOLUTION INTRAMUSCULAR; INTRAVENOUS
Status: DISCONTINUED | OUTPATIENT
Start: 2020-01-01 | End: 2020-08-07 | Stop reason: HOSPADM

## 2020-01-01 RX ORDER — SODIUM CHLORIDE 9 MG/ML
INJECTION, SOLUTION INTRAVENOUS
Status: DISCONTINUED | OUTPATIENT
Start: 2020-01-01 | End: 2020-01-01 | Stop reason: SURG

## 2020-01-01 RX ORDER — MIDAZOLAM HYDROCHLORIDE 1 MG/ML
INJECTION INTRAMUSCULAR; INTRAVENOUS PRN
Status: DISCONTINUED | OUTPATIENT
Start: 2020-01-01 | End: 2020-01-01 | Stop reason: SURG

## 2020-01-01 RX ORDER — CALCIUM CHLORIDE 100 MG/ML
1 INJECTION INTRAVENOUS; INTRAVENTRICULAR ONCE
Status: DISCONTINUED | OUTPATIENT
Start: 2020-01-01 | End: 2020-01-01

## 2020-01-01 RX ORDER — NOREPINEPHRINE BITARTRATE 1 MG/ML
INJECTION, SOLUTION INTRAVENOUS
Status: COMPLETED
Start: 2020-01-01 | End: 2020-01-01

## 2020-01-01 RX ORDER — AMOXICILLIN 500 MG/1
CAPSULE ORAL
COMMUNITY
Start: 2020-01-01 | End: 2020-01-01

## 2020-01-01 RX ORDER — CEFAZOLIN SODIUM 1 G/3ML
INJECTION, POWDER, FOR SOLUTION INTRAMUSCULAR; INTRAVENOUS
Status: COMPLETED
Start: 2020-01-01 | End: 2020-01-01

## 2020-01-01 RX ORDER — BISACODYL 10 MG
10 SUPPOSITORY, RECTAL RECTAL
Status: DISCONTINUED | OUTPATIENT
Start: 2020-01-01 | End: 2020-01-01

## 2020-01-01 RX ORDER — ISOSORBIDE MONONITRATE 30 MG/1
30 TABLET, EXTENDED RELEASE ORAL
COMMUNITY
Start: 2020-01-01 | End: 2020-01-01

## 2020-01-01 RX ORDER — CALCIUM CHLORIDE 100 MG/ML
INJECTION INTRAVENOUS; INTRAVENTRICULAR
Status: COMPLETED
Start: 2020-01-01 | End: 2020-01-01

## 2020-01-01 RX ORDER — NITROGLYCERIN 20 MG/100ML
0-200 INJECTION INTRAVENOUS CONTINUOUS
Status: DISCONTINUED | OUTPATIENT
Start: 2020-01-01 | End: 2020-01-01

## 2020-01-01 RX ORDER — LORAZEPAM 2 MG/ML
2 INJECTION INTRAMUSCULAR
Status: DISCONTINUED | OUTPATIENT
Start: 2020-01-01 | End: 2020-01-01

## 2020-01-01 RX ORDER — BACLOFEN 10 MG/1
TABLET ORAL
COMMUNITY
Start: 2020-01-01

## 2020-01-01 RX ORDER — MAGNESIUM SULFATE HEPTAHYDRATE 40 MG/ML
INJECTION, SOLUTION INTRAVENOUS
Status: COMPLETED
Start: 2020-01-01 | End: 2020-01-01

## 2020-01-01 RX ORDER — DEXMEDETOMIDINE HYDROCHLORIDE 4 UG/ML
.1-1.5 INJECTION, SOLUTION INTRAVENOUS CONTINUOUS
Status: DISCONTINUED | OUTPATIENT
Start: 2020-01-01 | End: 2020-08-07 | Stop reason: HOSPADM

## 2020-01-01 RX ORDER — ASPIRIN 81 MG/1
81 TABLET, CHEWABLE ORAL DAILY
Status: DISCONTINUED | OUTPATIENT
Start: 2020-01-01 | End: 2020-01-01

## 2020-01-01 RX ORDER — KETAMINE HYDROCHLORIDE 50 MG/ML
INJECTION, SOLUTION INTRAMUSCULAR; INTRAVENOUS PRN
Status: DISCONTINUED | OUTPATIENT
Start: 2020-01-01 | End: 2020-01-01 | Stop reason: SURG

## 2020-01-01 RX ORDER — CYCLOBENZAPRINE HCL 10 MG
TABLET ORAL
COMMUNITY
Start: 2020-01-01

## 2020-01-01 RX ORDER — EPINEPHRINE IN SOD CHLOR,ISO 1 MG/10 ML
SYRINGE (ML) INTRAVENOUS
Status: COMPLETED
Start: 2020-01-01 | End: 2020-01-01

## 2020-01-01 RX ORDER — HEPARIN SODIUM 1000 [USP'U]/ML
6000 INJECTION, SOLUTION INTRAVENOUS; SUBCUTANEOUS ONCE
Status: COMPLETED | OUTPATIENT
Start: 2020-01-01 | End: 2020-01-01

## 2020-01-01 RX ORDER — HEPARIN SODIUM 1000 [USP'U]/ML
3200 INJECTION, SOLUTION INTRAVENOUS; SUBCUTANEOUS PRN
Status: DISCONTINUED | OUTPATIENT
Start: 2020-01-01 | End: 2020-01-01

## 2020-01-01 RX ORDER — CAPTOPRIL 12.5 MG/1
6.25 TABLET ORAL EVERY 6 HOURS
Status: DISCONTINUED | OUTPATIENT
Start: 2020-01-01 | End: 2020-01-01

## 2020-01-01 RX ORDER — NOREPINEPHRINE BITARTRATE 0.03 MG/ML
0-30 INJECTION, SOLUTION INTRAVENOUS CONTINUOUS
Status: DISCONTINUED | OUTPATIENT
Start: 2020-01-01 | End: 2020-08-07 | Stop reason: HOSPADM

## 2020-01-01 RX ORDER — MAGNESIUM SULFATE HEPTAHYDRATE 40 MG/ML
2 INJECTION, SOLUTION INTRAVENOUS ONCE
Status: DISCONTINUED | OUTPATIENT
Start: 2020-01-01 | End: 2020-08-07 | Stop reason: HOSPADM

## 2020-01-01 RX ORDER — ETOMIDATE 2 MG/ML
15 INJECTION INTRAVENOUS ONCE
Status: COMPLETED | OUTPATIENT
Start: 2020-01-01 | End: 2020-01-01

## 2020-01-01 RX ORDER — FUROSEMIDE 10 MG/ML
20 INJECTION INTRAMUSCULAR; INTRAVENOUS
Status: DISCONTINUED | OUTPATIENT
Start: 2020-01-01 | End: 2020-01-01

## 2020-01-01 RX ORDER — METOPROLOL TARTRATE 1 MG/ML
INJECTION, SOLUTION INTRAVENOUS
Status: DISCONTINUED
Start: 2020-01-01 | End: 2020-01-01

## 2020-01-01 RX ORDER — LIDOCAINE HYDROCHLORIDE 20 MG/ML
5 INJECTION, SOLUTION INFILTRATION; PERINEURAL PRN
Status: ACTIVE | OUTPATIENT
Start: 2020-01-01 | End: 2020-01-01

## 2020-01-01 RX ORDER — VERAPAMIL HYDROCHLORIDE 2.5 MG/ML
INJECTION, SOLUTION INTRAVENOUS
Status: COMPLETED
Start: 2020-01-01 | End: 2020-01-01

## 2020-01-01 RX ORDER — ACETYLCYSTEINE 200 MG/ML
3 SOLUTION ORAL; RESPIRATORY (INHALATION) PRN
Status: ACTIVE | OUTPATIENT
Start: 2020-01-01 | End: 2020-01-01

## 2020-01-01 RX ORDER — BISACODYL 10 MG
10 SUPPOSITORY, RECTAL RECTAL
Status: DISCONTINUED | OUTPATIENT
Start: 2020-01-01 | End: 2020-08-07 | Stop reason: HOSPADM

## 2020-01-01 RX ORDER — DEXTROSE MONOHYDRATE 25 G/50ML
INJECTION, SOLUTION INTRAVENOUS
Status: COMPLETED
Start: 2020-01-01 | End: 2020-01-01

## 2020-01-01 RX ORDER — ROCURONIUM BROMIDE 10 MG/ML
INJECTION, SOLUTION INTRAVENOUS PRN
Status: DISCONTINUED | OUTPATIENT
Start: 2020-01-01 | End: 2020-01-01 | Stop reason: SURG

## 2020-01-01 RX ORDER — LORAZEPAM 2 MG/ML
4 INJECTION INTRAMUSCULAR
Status: DISCONTINUED | OUTPATIENT
Start: 2020-01-01 | End: 2020-01-01

## 2020-01-01 RX ORDER — DEXTROSE MONOHYDRATE 25 G/50ML
50 INJECTION, SOLUTION INTRAVENOUS ONCE
Status: COMPLETED | OUTPATIENT
Start: 2020-01-01 | End: 2020-01-01

## 2020-01-01 RX ORDER — DEXTROSE MONOHYDRATE 50 MG/ML
INJECTION, SOLUTION INTRAVENOUS
Status: DISCONTINUED
Start: 2020-01-01 | End: 2020-01-01

## 2020-01-01 RX ORDER — LORAZEPAM 2 MG/ML
INJECTION INTRAMUSCULAR
Status: COMPLETED
Start: 2020-01-01 | End: 2020-01-01

## 2020-01-01 RX ORDER — LORAZEPAM 2 MG/ML
1 INJECTION INTRAMUSCULAR
Status: DISCONTINUED | OUTPATIENT
Start: 2020-01-01 | End: 2020-08-07 | Stop reason: HOSPADM

## 2020-01-01 RX ORDER — NITROGLYCERIN 20 MG/100ML
INJECTION INTRAVENOUS
Status: COMPLETED
Start: 2020-01-01 | End: 2020-01-01

## 2020-01-01 RX ORDER — ACETAMINOPHEN 325 MG/1
650 TABLET ORAL EVERY 6 HOURS PRN
Status: DISCONTINUED | OUTPATIENT
Start: 2020-01-01 | End: 2020-08-07 | Stop reason: HOSPADM

## 2020-01-01 RX ORDER — OXYCODONE HYDROCHLORIDE 10 MG/1
10 TABLET ORAL EVERY 4 HOURS PRN
Status: DISCONTINUED | OUTPATIENT
Start: 2020-01-01 | End: 2020-08-07 | Stop reason: HOSPADM

## 2020-01-01 RX ORDER — ASPIRIN 81 MG/1
TABLET, CHEWABLE ORAL
Status: COMPLETED
Start: 2020-01-01 | End: 2020-01-01

## 2020-01-01 RX ORDER — LORAZEPAM 2 MG/ML
1 INJECTION INTRAMUSCULAR
Status: DISCONTINUED | OUTPATIENT
Start: 2020-01-01 | End: 2020-01-01

## 2020-01-01 RX ORDER — LORAZEPAM 2 MG/ML
1 CONCENTRATE ORAL
Status: DISCONTINUED | OUTPATIENT
Start: 2020-01-01 | End: 2020-08-07 | Stop reason: HOSPADM

## 2020-01-01 RX ORDER — SUCCINYLCHOLINE CHLORIDE 20 MG/ML
80 INJECTION INTRAMUSCULAR; INTRAVENOUS ONCE
Status: COMPLETED | OUTPATIENT
Start: 2020-01-01 | End: 2020-01-01

## 2020-01-01 RX ORDER — AMOXICILLIN 250 MG
2 CAPSULE ORAL 2 TIMES DAILY PRN
Status: DISCONTINUED | OUTPATIENT
Start: 2020-01-01 | End: 2020-08-07 | Stop reason: HOSPADM

## 2020-01-01 RX ORDER — CLOPIDOGREL BISULFATE 75 MG/1
75 TABLET ORAL DAILY
COMMUNITY

## 2020-01-01 RX ORDER — ISOSORBIDE MONONITRATE 30 MG/1
30 TABLET, EXTENDED RELEASE ORAL EVERY MORNING
COMMUNITY

## 2020-01-01 RX ORDER — DEXTROSE MONOHYDRATE 25 G/50ML
50 INJECTION, SOLUTION INTRAVENOUS
Status: DISCONTINUED | OUTPATIENT
Start: 2020-01-01 | End: 2020-01-01

## 2020-01-01 RX ORDER — AMOXICILLIN 250 MG
2 CAPSULE ORAL 2 TIMES DAILY
Status: DISCONTINUED | OUTPATIENT
Start: 2020-01-01 | End: 2020-01-01

## 2020-01-01 RX ORDER — AMIODARONE HYDROCHLORIDE 150 MG/3ML
INJECTION, SOLUTION INTRAVENOUS PRN
Status: DISCONTINUED | OUTPATIENT
Start: 2020-01-01 | End: 2020-01-01 | Stop reason: SURG

## 2020-01-01 RX ORDER — SODIUM CHLORIDE, SODIUM LACTATE, POTASSIUM CHLORIDE, CALCIUM CHLORIDE 600; 310; 30; 20 MG/100ML; MG/100ML; MG/100ML; MG/100ML
INJECTION, SOLUTION INTRAVENOUS
Status: DISCONTINUED | OUTPATIENT
Start: 2020-01-01 | End: 2020-01-01 | Stop reason: SURG

## 2020-01-01 RX ORDER — VASOPRESSIN 20 U/ML
INJECTION PARENTERAL PRN
Status: DISCONTINUED | OUTPATIENT
Start: 2020-01-01 | End: 2020-01-01 | Stop reason: SURG

## 2020-01-01 RX ORDER — GABAPENTIN 300 MG/1
300 CAPSULE ORAL ONCE
Status: CANCELLED | OUTPATIENT
Start: 2020-01-01 | End: 2020-01-01

## 2020-01-01 RX ORDER — POLYETHYLENE GLYCOL 3350 17 G/17G
1 POWDER, FOR SOLUTION ORAL
Status: DISCONTINUED | OUTPATIENT
Start: 2020-01-01 | End: 2020-01-01

## 2020-01-01 RX ORDER — KETAMINE HYDROCHLORIDE 50 MG/ML
INJECTION, SOLUTION INTRAMUSCULAR; INTRAVENOUS
Status: DISCONTINUED
Start: 2020-01-01 | End: 2020-01-01

## 2020-01-01 RX ORDER — EPINEPHRINE HCL IN 0.9 % NACL 4MG/250ML
0-10 PLASTIC BAG, INJECTION (ML) INTRAVENOUS CONTINUOUS
Status: DISCONTINUED | OUTPATIENT
Start: 2020-01-01 | End: 2020-08-07 | Stop reason: HOSPADM

## 2020-01-01 RX ORDER — LORAZEPAM 2 MG/ML
3 INJECTION INTRAMUSCULAR
Status: DISCONTINUED | OUTPATIENT
Start: 2020-01-01 | End: 2020-01-01

## 2020-01-01 RX ORDER — ROCURONIUM BROMIDE 10 MG/ML
50 INJECTION, SOLUTION INTRAVENOUS ONCE
Status: COMPLETED | OUTPATIENT
Start: 2020-01-01 | End: 2020-01-01

## 2020-01-01 RX ORDER — FUROSEMIDE 10 MG/ML
20 INJECTION INTRAMUSCULAR; INTRAVENOUS ONCE
Status: COMPLETED | OUTPATIENT
Start: 2020-01-01 | End: 2020-01-01

## 2020-01-01 RX ORDER — POLYETHYLENE GLYCOL 3350 17 G/17G
1 POWDER, FOR SOLUTION ORAL 2 TIMES DAILY
Status: DISCONTINUED | OUTPATIENT
Start: 2020-01-01 | End: 2020-01-01

## 2020-01-01 RX ORDER — IPRATROPIUM BROMIDE AND ALBUTEROL SULFATE 2.5; .5 MG/3ML; MG/3ML
3 SOLUTION RESPIRATORY (INHALATION)
Status: DISCONTINUED | OUTPATIENT
Start: 2020-01-01 | End: 2020-01-01

## 2020-01-01 RX ORDER — ESMOLOL HYDROCHLORIDE 10 MG/ML
INJECTION INTRAVENOUS PRN
Status: DISCONTINUED | OUTPATIENT
Start: 2020-01-01 | End: 2020-01-01 | Stop reason: SURG

## 2020-01-01 RX ORDER — POTASSIUM CHLORIDE 20 MEQ/1
40 TABLET, EXTENDED RELEASE ORAL ONCE
Status: COMPLETED | OUTPATIENT
Start: 2020-01-01 | End: 2020-01-01

## 2020-01-01 RX ADMIN — PIPERACILLIN AND TAZOBACTAM 3.38 G: 3; .375 INJECTION, POWDER, LYOPHILIZED, FOR SOLUTION INTRAVENOUS; PARENTERAL at 19:43

## 2020-01-01 RX ADMIN — FENTANYL CITRATE 100 MCG: 50 INJECTION INTRAMUSCULAR; INTRAVENOUS at 23:59

## 2020-01-01 RX ADMIN — AMIODARONE HYDROCHLORIDE 150 MG: 1.5 INJECTION, SOLUTION INTRAVENOUS at 17:21

## 2020-01-01 RX ADMIN — PROPOFOL 15 MCG/KG/MIN: 10 INJECTION, EMULSION INTRAVENOUS at 23:44

## 2020-01-01 RX ADMIN — SODIUM BICARBONATE 50 MEQ: 84 INJECTION, SOLUTION INTRAVENOUS at 14:58

## 2020-01-01 RX ADMIN — OXYCODONE HYDROCHLORIDE 10 MG: 10 TABLET ORAL at 18:35

## 2020-01-01 RX ADMIN — FENTANYL CITRATE 100 MCG: 50 INJECTION INTRAMUSCULAR; INTRAVENOUS at 15:00

## 2020-01-01 RX ADMIN — PIPERACILLIN AND TAZOBACTAM 3.38 G: 3; .375 INJECTION, POWDER, LYOPHILIZED, FOR SOLUTION INTRAVENOUS; PARENTERAL at 20:53

## 2020-01-01 RX ADMIN — Medication 100 MG: at 17:33

## 2020-01-01 RX ADMIN — Medication 100 MG: at 05:35

## 2020-01-01 RX ADMIN — VANCOMYCIN HYDROCHLORIDE 2250 MG: 500 INJECTION, POWDER, LYOPHILIZED, FOR SOLUTION INTRAVENOUS at 20:21

## 2020-01-01 RX ADMIN — TICAGRELOR 90 MG: 90 TABLET ORAL at 06:00

## 2020-01-01 RX ADMIN — PROPOFOL 80 MCG/KG/MIN: 10 INJECTION, EMULSION INTRAVENOUS at 11:26

## 2020-01-01 RX ADMIN — DEXMEDETOMIDINE HYDROCHLORIDE 0.5 MCG/KG/HR: 100 INJECTION, SOLUTION INTRAVENOUS at 16:56

## 2020-01-01 RX ADMIN — ROCURONIUM BROMIDE 50 MG: 10 INJECTION, SOLUTION INTRAVENOUS at 01:50

## 2020-01-01 RX ADMIN — SODIUM BICARBONATE 50 MEQ: 84 INJECTION, SOLUTION INTRAVENOUS at 16:08

## 2020-01-01 RX ADMIN — HEPARIN SODIUM 350 UNITS/HR: 5000 INJECTION, SOLUTION INTRAVENOUS; SUBCUTANEOUS at 07:01

## 2020-01-01 RX ADMIN — DEXMEDETOMIDINE HYDROCHLORIDE 0.5 MCG/KG/HR: 100 INJECTION, SOLUTION INTRAVENOUS at 22:08

## 2020-01-01 RX ADMIN — ATORVASTATIN CALCIUM 40 MG: 40 TABLET, FILM COATED ORAL at 17:06

## 2020-01-01 RX ADMIN — VASOPRESSIN 1 UNITS: 20 INJECTION INTRAVENOUS at 14:10

## 2020-01-01 RX ADMIN — TICAGRELOR 90 MG: 90 TABLET ORAL at 17:28

## 2020-01-01 RX ADMIN — DEXMEDETOMIDINE HYDROCHLORIDE 0.5 MCG/KG/HR: 100 INJECTION, SOLUTION INTRAVENOUS at 15:02

## 2020-01-01 RX ADMIN — NOREPINEPHRINE BITARTRATE 10 MCG/MIN: 1 INJECTION INTRAVENOUS at 03:19

## 2020-01-01 RX ADMIN — DEXMEDETOMIDINE HYDROCHLORIDE 1 MCG/KG/HR: 100 INJECTION, SOLUTION INTRAVENOUS at 08:21

## 2020-01-01 RX ADMIN — FUROSEMIDE 20 MG: 10 INJECTION, SOLUTION INTRAMUSCULAR; INTRAVENOUS at 15:46

## 2020-01-01 RX ADMIN — TICAGRELOR 180 MG: 90 TABLET ORAL at 15:49

## 2020-01-01 RX ADMIN — LORAZEPAM 2 MG: 2 INJECTION INTRAMUSCULAR; INTRAVENOUS at 21:24

## 2020-01-01 RX ADMIN — SUCCINYLCHOLINE CHLORIDE 80 MG: 20 INJECTION, SOLUTION INTRAMUSCULAR; INTRAVENOUS; PARENTERAL at 17:25

## 2020-01-01 RX ADMIN — HEPARIN SODIUM 1200 UNITS/HR: 5000 INJECTION, SOLUTION INTRAVENOUS at 17:33

## 2020-01-01 RX ADMIN — ACETAMINOPHEN 650 MG: 325 TABLET, FILM COATED ORAL at 05:25

## 2020-01-01 RX ADMIN — MAGNESIUM SULFATE 2 G: 2 INJECTION INTRAVENOUS at 11:09

## 2020-01-01 RX ADMIN — DEXMEDETOMIDINE HYDROCHLORIDE 0.6 MCG/KG/HR: 100 INJECTION, SOLUTION INTRAVENOUS at 18:44

## 2020-01-01 RX ADMIN — FUROSEMIDE 20 MG: 10 INJECTION, SOLUTION INTRAMUSCULAR; INTRAVENOUS at 05:15

## 2020-01-01 RX ADMIN — ASPIRIN 81 MG 81 MG: 81 TABLET ORAL at 09:10

## 2020-01-01 RX ADMIN — AMIODARONE HYDROCHLORIDE 1 MG/MIN: 1.8 INJECTION, SOLUTION INTRAVENOUS at 17:41

## 2020-01-01 RX ADMIN — FENTANYL CITRATE 100 MCG: 50 INJECTION INTRAMUSCULAR; INTRAVENOUS at 01:31

## 2020-01-01 RX ADMIN — Medication 2 MCG: at 01:50

## 2020-01-01 RX ADMIN — MAGNESIUM SULFATE IN WATER 4 G: 40 INJECTION, SOLUTION INTRAVENOUS at 08:07

## 2020-01-01 RX ADMIN — NOREPINEPHRINE BITARTRATE 5 MCG/MIN: 1 INJECTION INTRAVENOUS at 00:23

## 2020-01-01 RX ADMIN — POTASSIUM CHLORIDE 10 MEQ: 7.46 INJECTION, SOLUTION INTRAVENOUS at 13:21

## 2020-01-01 RX ADMIN — NOREPINEPHRINE BITARTRATE 6 MCG/MIN: 1 INJECTION INTRAVENOUS at 20:15

## 2020-01-01 RX ADMIN — NOREPINEPHRINE BITARTRATE 10 MCG/MIN: 1 INJECTION INTRAVENOUS at 12:11

## 2020-01-01 RX ADMIN — LIDOCAINE HYDROCHLORIDE: 20 INJECTION, SOLUTION INFILTRATION; PERINEURAL at 15:55

## 2020-01-01 RX ADMIN — PIPERACILLIN AND TAZOBACTAM 3.38 G: 3; .375 INJECTION, POWDER, LYOPHILIZED, FOR SOLUTION INTRAVENOUS; PARENTERAL at 20:12

## 2020-01-01 RX ADMIN — SODIUM BICARBONATE 50 MEQ: 84 INJECTION, SOLUTION INTRAVENOUS at 16:00

## 2020-01-01 RX ADMIN — Medication 50 MEQ: at 16:07

## 2020-01-01 RX ADMIN — AMPICILLIN AND SULBACTAM 3 G: 2; 1 INJECTION, POWDER, FOR SOLUTION INTRAVENOUS at 00:26

## 2020-01-01 RX ADMIN — ONDANSETRON 4 MG: 2 INJECTION INTRAMUSCULAR; INTRAVENOUS at 06:47

## 2020-01-01 RX ADMIN — CEFAZOLIN 2 G: 330 INJECTION, POWDER, FOR SOLUTION INTRAMUSCULAR; INTRAVENOUS at 13:07

## 2020-01-01 RX ADMIN — FENTANYL CITRATE 100 MCG: 50 INJECTION INTRAMUSCULAR; INTRAVENOUS at 13:21

## 2020-01-01 RX ADMIN — Medication 100 MCG/HR: at 04:08

## 2020-01-01 RX ADMIN — FENTANYL CITRATE 100 MCG: 50 INJECTION INTRAMUSCULAR; INTRAVENOUS at 01:43

## 2020-01-01 RX ADMIN — POLYETHYLENE GLYCOL 3350 1 PACKET: 17 POWDER, FOR SOLUTION ORAL at 07:40

## 2020-01-01 RX ADMIN — DEXMEDETOMIDINE HYDROCHLORIDE 0.4 MCG/KG/HR: 100 INJECTION, SOLUTION INTRAVENOUS at 11:55

## 2020-01-01 RX ADMIN — FENTANYL CITRATE 100 MCG: 50 INJECTION INTRAMUSCULAR; INTRAVENOUS at 05:00

## 2020-01-01 RX ADMIN — FENTANYL CITRATE 100 MCG: 50 INJECTION INTRAMUSCULAR; INTRAVENOUS at 11:26

## 2020-01-01 RX ADMIN — VASOPRESSIN 0.03 UNITS/MIN: 20 INJECTION INTRAVENOUS at 17:32

## 2020-01-01 RX ADMIN — DEXMEDETOMIDINE HYDROCHLORIDE 0.7 MCG/KG/HR: 100 INJECTION, SOLUTION INTRAVENOUS at 10:37

## 2020-01-01 RX ADMIN — CALCIUM CHLORIDE 1 G: 100 INJECTION INTRAVENOUS; INTRAVENTRICULAR at 17:17

## 2020-01-01 RX ADMIN — PIPERACILLIN AND TAZOBACTAM 3.38 G: 3; .375 INJECTION, POWDER, LYOPHILIZED, FOR SOLUTION INTRAVENOUS; PARENTERAL at 21:15

## 2020-01-01 RX ADMIN — Medication 200 MCG: at 17:12

## 2020-01-01 RX ADMIN — LORAZEPAM 2 MG: 2 INJECTION INTRAMUSCULAR; INTRAVENOUS at 21:41

## 2020-01-01 RX ADMIN — DEXMEDETOMIDINE HYDROCHLORIDE 0.5 MCG/KG/HR: 100 INJECTION, SOLUTION INTRAVENOUS at 12:52

## 2020-01-01 RX ADMIN — PIPERACILLIN AND TAZOBACTAM 3.38 G: 3; .375 INJECTION, POWDER, LYOPHILIZED, FOR SOLUTION INTRAVENOUS; PARENTERAL at 20:48

## 2020-01-01 RX ADMIN — SODIUM BICARBONATE 100 MEQ: 84 INJECTION, SOLUTION INTRAVENOUS at 14:25

## 2020-01-01 RX ADMIN — TICAGRELOR 90 MG: 90 TABLET ORAL at 05:34

## 2020-01-01 RX ADMIN — FENTANYL CITRATE 100 MCG: 50 INJECTION INTRAMUSCULAR; INTRAVENOUS at 19:08

## 2020-01-01 RX ADMIN — Medication 300 MCG: at 17:09

## 2020-01-01 RX ADMIN — SODIUM BICARBONATE 50 MEQ: 84 INJECTION, SOLUTION INTRAVENOUS at 05:01

## 2020-01-01 RX ADMIN — SODIUM BICARBONATE 50 MEQ: 84 INJECTION, SOLUTION INTRAVENOUS at 16:07

## 2020-01-01 RX ADMIN — PIPERACILLIN AND TAZOBACTAM 3.38 G: 3; .375 INJECTION, POWDER, LYOPHILIZED, FOR SOLUTION INTRAVENOUS; PARENTERAL at 11:57

## 2020-01-01 RX ADMIN — FENTANYL CITRATE 50 MCG: 50 INJECTION INTRAMUSCULAR; INTRAVENOUS at 19:18

## 2020-01-01 RX ADMIN — HEPARIN SODIUM 1300 UNITS: 5000 INJECTION, SOLUTION INTRAVENOUS at 11:30

## 2020-01-01 RX ADMIN — MORPHINE SULFATE 2 MG: 4 INJECTION INTRAVENOUS at 22:09

## 2020-01-01 RX ADMIN — INSULIN HUMAN 2 UNITS: 100 INJECTION, SOLUTION PARENTERAL at 00:22

## 2020-01-01 RX ADMIN — FUROSEMIDE 20 MG: 10 INJECTION, SOLUTION INTRAMUSCULAR; INTRAVENOUS at 17:30

## 2020-01-01 RX ADMIN — PIPERACILLIN AND TAZOBACTAM 3.38 G: 3; .375 INJECTION, POWDER, LYOPHILIZED, FOR SOLUTION INTRAVENOUS; PARENTERAL at 05:01

## 2020-01-01 RX ADMIN — MIDAZOLAM HYDROCHLORIDE 2 MG: 1 INJECTION, SOLUTION INTRAMUSCULAR; INTRAVENOUS at 15:26

## 2020-01-01 RX ADMIN — ACETAMINOPHEN 650 MG: 325 TABLET, FILM COATED ORAL at 16:15

## 2020-01-01 RX ADMIN — SODIUM CHLORIDE 500 ML: 9 INJECTION, SOLUTION INTRAVENOUS at 20:43

## 2020-01-01 RX ADMIN — NOREPINEPHRINE BITARTRATE 6 MCG/MIN: 1 INJECTION INTRAVENOUS at 06:45

## 2020-01-01 RX ADMIN — HEPARIN SODIUM 2000 UNITS: 200 INJECTION, SOLUTION INTRAVENOUS at 13:55

## 2020-01-01 RX ADMIN — PIPERACILLIN AND TAZOBACTAM 3.38 G: 3; .375 INJECTION, POWDER, LYOPHILIZED, FOR SOLUTION INTRAVENOUS; PARENTERAL at 05:34

## 2020-01-01 RX ADMIN — FENTANYL CITRATE 100 MCG: 50 INJECTION INTRAMUSCULAR; INTRAVENOUS at 05:40

## 2020-01-01 RX ADMIN — PROPOFOL 20 MG: 10 INJECTION, EMULSION INTRAVENOUS at 16:23

## 2020-01-01 RX ADMIN — EPINEPHRINE 20 MCG: 1 INJECTION INTRAMUSCULAR; INTRAVENOUS; SUBCUTANEOUS at 14:35

## 2020-01-01 RX ADMIN — Medication 100 MG: at 17:28

## 2020-01-01 RX ADMIN — EPINEPHRINE 10 MCG: 1 INJECTION INTRAMUSCULAR; INTRAVENOUS; SUBCUTANEOUS at 14:39

## 2020-01-01 RX ADMIN — POTASSIUM CHLORIDE 10 MEQ: 7.46 INJECTION, SOLUTION INTRAVENOUS at 08:20

## 2020-01-01 RX ADMIN — HEPARIN SODIUM 3200 UNITS: 1000 INJECTION, SOLUTION INTRAVENOUS; SUBCUTANEOUS at 00:33

## 2020-01-01 RX ADMIN — PHENYLEPHRINE HYDROCHLORIDE 300 MCG/MIN: 10 INJECTION INTRAVENOUS at 18:15

## 2020-01-01 RX ADMIN — VASOPRESSIN 1 UNITS: 20 INJECTION INTRAVENOUS at 13:26

## 2020-01-01 RX ADMIN — HEPARIN SODIUM 375 UNITS/HR: 5000 INJECTION, SOLUTION INTRAVENOUS; SUBCUTANEOUS at 21:03

## 2020-01-01 RX ADMIN — FUROSEMIDE 20 MG: 10 INJECTION, SOLUTION INTRAMUSCULAR; INTRAVENOUS at 06:42

## 2020-01-01 RX ADMIN — HEPARIN SODIUM 825 UNITS/HR: 5000 INJECTION, SOLUTION INTRAVENOUS at 02:09

## 2020-01-01 RX ADMIN — KETAMINE HYDROCHLORIDE 25 MG: 50 INJECTION INTRAMUSCULAR; INTRAVENOUS at 15:54

## 2020-01-01 RX ADMIN — DOBUTAMINE HYDROCHLORIDE 2.5 MCG/KG/MIN: 100 INJECTION INTRAVENOUS at 04:31

## 2020-01-01 RX ADMIN — LORAZEPAM 2 MG: 2 INJECTION INTRAMUSCULAR; INTRAVENOUS at 02:34

## 2020-01-01 RX ADMIN — Medication 100 MG: at 18:36

## 2020-01-01 RX ADMIN — Medication 300 MCG: at 16:31

## 2020-01-01 RX ADMIN — POTASSIUM CHLORIDE 20 MEQ: 14.9 INJECTION, SOLUTION INTRAVENOUS at 21:14

## 2020-01-01 RX ADMIN — NITROGLYCERIN 100 MCG/MIN: 20 INJECTION INTRAVENOUS at 12:35

## 2020-01-01 RX ADMIN — AMIODARONE HYDROCHLORIDE 100 MG: 50 INJECTION, SOLUTION INTRAVENOUS at 13:24

## 2020-01-01 RX ADMIN — LINEZOLID 600 MG: 600 INJECTION, SOLUTION INTRAVENOUS at 05:01

## 2020-01-01 RX ADMIN — ASPIRIN 81 MG 81 MG: 81 TABLET ORAL at 05:34

## 2020-01-01 RX ADMIN — MIDAZOLAM 1 MG: 1 INJECTION INTRAMUSCULAR; INTRAVENOUS at 15:40

## 2020-01-01 RX ADMIN — Medication 50 MCG/HR: at 11:02

## 2020-01-01 RX ADMIN — MIDAZOLAM HYDROCHLORIDE 2 MG: 1 INJECTION, SOLUTION INTRAMUSCULAR; INTRAVENOUS at 13:06

## 2020-01-01 RX ADMIN — FUROSEMIDE 20 MG: 10 INJECTION, SOLUTION INTRAMUSCULAR; INTRAVENOUS at 17:33

## 2020-01-01 RX ADMIN — DEXMEDETOMIDINE HYDROCHLORIDE 1 MCG/KG/HR: 100 INJECTION, SOLUTION INTRAVENOUS at 14:20

## 2020-01-01 RX ADMIN — CALCIUM CHLORIDE 500 MG: 100 INJECTION INTRAVENOUS; INTRAVENTRICULAR at 14:27

## 2020-01-01 RX ADMIN — HEPARIN SODIUM 1200 UNITS/HR: 5000 INJECTION, SOLUTION INTRAVENOUS at 17:29

## 2020-01-01 RX ADMIN — DEXMEDETOMIDINE HYDROCHLORIDE 1 MCG/KG/HR: 100 INJECTION, SOLUTION INTRAVENOUS at 02:52

## 2020-01-01 RX ADMIN — HEPARIN SODIUM 3200 UNITS: 1000 INJECTION, SOLUTION INTRAVENOUS; SUBCUTANEOUS at 02:13

## 2020-01-01 RX ADMIN — FENTANYL CITRATE 100 MCG: 50 INJECTION INTRAMUSCULAR; INTRAVENOUS at 18:31

## 2020-01-01 RX ADMIN — FENTANYL CITRATE 100 MCG: 50 INJECTION INTRAMUSCULAR; INTRAVENOUS at 21:05

## 2020-01-01 RX ADMIN — HEPARIN SODIUM 1300 UNITS/HR: 5000 INJECTION, SOLUTION INTRAVENOUS at 07:51

## 2020-01-01 RX ADMIN — PROPOFOL 40 MCG/KG/MIN: 10 INJECTION, EMULSION INTRAVENOUS at 17:03

## 2020-01-01 RX ADMIN — Medication 50 MCG/HR: at 11:31

## 2020-01-01 RX ADMIN — HEPARIN SODIUM 1000 UNITS: 1000 INJECTION, SOLUTION INTRAVENOUS; SUBCUTANEOUS at 14:40

## 2020-01-01 RX ADMIN — IOHEXOL 170 ML: 350 INJECTION, SOLUTION INTRAVENOUS at 16:48

## 2020-01-01 RX ADMIN — HEPARIN SODIUM 212 UNITS/HR: 5000 INJECTION, SOLUTION INTRAVENOUS; SUBCUTANEOUS at 22:12

## 2020-01-01 RX ADMIN — LORAZEPAM 2 MG: 2 INJECTION INTRAMUSCULAR; INTRAVENOUS at 21:14

## 2020-01-01 RX ADMIN — SODIUM CHLORIDE, POTASSIUM CHLORIDE, SODIUM LACTATE AND CALCIUM CHLORIDE 1000 ML: 600; 310; 30; 20 INJECTION, SOLUTION INTRAVENOUS at 16:15

## 2020-01-01 RX ADMIN — DEXMEDETOMIDINE HYDROCHLORIDE 0.6 MCG/KG/HR: 100 INJECTION, SOLUTION INTRAVENOUS at 20:37

## 2020-01-01 RX ADMIN — FENTANYL CITRATE 100 MCG: 50 INJECTION INTRAMUSCULAR; INTRAVENOUS at 21:34

## 2020-01-01 RX ADMIN — TICAGRELOR 90 MG: 90 TABLET ORAL at 17:40

## 2020-01-01 RX ADMIN — CALCIUM CHLORIDE 1 G: 100 INJECTION, SOLUTION INTRAVENOUS at 17:17

## 2020-01-01 RX ADMIN — ASPIRIN 81 MG 81 MG: 81 TABLET ORAL at 05:01

## 2020-01-01 RX ADMIN — FAMOTIDINE 20 MG: 10 INJECTION, SOLUTION INTRAVENOUS at 17:46

## 2020-01-01 RX ADMIN — MAGNESIUM SULFATE 2 G: 2 INJECTION INTRAVENOUS at 21:12

## 2020-01-01 RX ADMIN — Medication 200 MCG: at 15:12

## 2020-01-01 RX ADMIN — DEXMEDETOMIDINE HYDROCHLORIDE 1 MCG/KG/HR: 100 INJECTION, SOLUTION INTRAVENOUS at 22:31

## 2020-01-01 RX ADMIN — HEPARIN SODIUM 6000 UNITS: 1000 INJECTION, SOLUTION INTRAVENOUS; SUBCUTANEOUS at 17:26

## 2020-01-01 RX ADMIN — FAMOTIDINE 20 MG: 10 INJECTION, SOLUTION INTRAVENOUS at 05:02

## 2020-01-01 RX ADMIN — DEXMEDETOMIDINE HYDROCHLORIDE 0.3 MCG/KG/HR: 100 INJECTION, SOLUTION INTRAVENOUS at 20:59

## 2020-01-01 RX ADMIN — ATORVASTATIN CALCIUM 40 MG: 40 TABLET, FILM COATED ORAL at 17:46

## 2020-01-01 RX ADMIN — DEXMEDETOMIDINE HYDROCHLORIDE 1 MCG/KG/HR: 100 INJECTION, SOLUTION INTRAVENOUS at 03:15

## 2020-01-01 RX ADMIN — FAMOTIDINE 20 MG: 20 TABLET, FILM COATED ORAL at 05:04

## 2020-01-01 RX ADMIN — ATORVASTATIN CALCIUM 40 MG: 40 TABLET, FILM COATED ORAL at 18:35

## 2020-01-01 RX ADMIN — TICAGRELOR 90 MG: 90 TABLET ORAL at 18:00

## 2020-01-01 RX ADMIN — SODIUM CHLORIDE 500 ML: 9 INJECTION, SOLUTION INTRAVENOUS at 12:15

## 2020-01-01 RX ADMIN — FENTANYL CITRATE 100 MCG: 50 INJECTION INTRAMUSCULAR; INTRAVENOUS at 17:22

## 2020-01-01 RX ADMIN — FENTANYL CITRATE 50 MCG: 50 INJECTION INTRAMUSCULAR; INTRAVENOUS at 13:24

## 2020-01-01 RX ADMIN — ESMOLOL HYDROCHLORIDE 20 MG: 100 INJECTION, SOLUTION INTRAVENOUS at 13:10

## 2020-01-01 RX ADMIN — FENTANYL CITRATE 100 MCG: 50 INJECTION INTRAMUSCULAR; INTRAVENOUS at 11:37

## 2020-01-01 RX ADMIN — SODIUM CHLORIDE: 9 INJECTION, SOLUTION INTRAVENOUS at 08:19

## 2020-01-01 RX ADMIN — FAMOTIDINE 20 MG: 20 TABLET, FILM COATED ORAL at 17:27

## 2020-01-01 RX ADMIN — SODIUM CHLORIDE, POTASSIUM CHLORIDE, SODIUM LACTATE AND CALCIUM CHLORIDE: 600; 310; 30; 20 INJECTION, SOLUTION INTRAVENOUS at 13:07

## 2020-01-01 RX ADMIN — FAMOTIDINE 20 MG: 20 TABLET, FILM COATED ORAL at 17:11

## 2020-01-01 RX ADMIN — FAMOTIDINE 20 MG: 10 INJECTION, SOLUTION INTRAVENOUS at 04:39

## 2020-01-01 RX ADMIN — DEXMEDETOMIDINE HYDROCHLORIDE 1 MCG/KG/HR: 100 INJECTION, SOLUTION INTRAVENOUS at 17:51

## 2020-01-01 RX ADMIN — CALCIUM CHLORIDE 1 G: 100 INJECTION INTRAVENOUS; INTRAVENTRICULAR at 16:01

## 2020-01-01 RX ADMIN — MORPHINE SULFATE 10 MG: 10 INJECTION INTRAVENOUS at 18:31

## 2020-01-01 RX ADMIN — FENTANYL CITRATE 100 MCG: 50 INJECTION INTRAMUSCULAR; INTRAVENOUS at 23:32

## 2020-01-01 RX ADMIN — Medication 2 MCG: at 01:54

## 2020-01-01 RX ADMIN — SODIUM CHLORIDE: 9 INJECTION, SOLUTION INTRAVENOUS at 14:00

## 2020-01-01 RX ADMIN — FENTANYL CITRATE 100 MCG: 50 INJECTION INTRAMUSCULAR; INTRAVENOUS at 07:54

## 2020-01-01 RX ADMIN — ATORVASTATIN CALCIUM 40 MG: 40 TABLET, FILM COATED ORAL at 17:11

## 2020-01-01 RX ADMIN — SODIUM CHLORIDE 250 ML: 9 INJECTION, SOLUTION INTRAVENOUS at 17:36

## 2020-01-01 RX ADMIN — LORAZEPAM 2 MG: 2 INJECTION INTRAMUSCULAR; INTRAVENOUS at 23:47

## 2020-01-01 RX ADMIN — LORAZEPAM 2 MG: 2 INJECTION INTRAMUSCULAR; INTRAVENOUS at 06:47

## 2020-01-01 RX ADMIN — Medication 150 MCG: at 11:27

## 2020-01-01 RX ADMIN — DEXTROSE MONOHYDRATE 30 ML: 50 INJECTION, SOLUTION INTRAVENOUS at 18:00

## 2020-01-01 RX ADMIN — FUROSEMIDE 20 MG: 10 INJECTION, SOLUTION INTRAMUSCULAR; INTRAVENOUS at 05:02

## 2020-01-01 RX ADMIN — FENTANYL CITRATE 100 MCG: 50 INJECTION INTRAMUSCULAR; INTRAVENOUS at 10:33

## 2020-01-01 RX ADMIN — DOBUTAMINE HYDROCHLORIDE 2.5 MCG/KG/MIN: 100 INJECTION INTRAVENOUS at 16:14

## 2020-01-01 RX ADMIN — PIPERACILLIN AND TAZOBACTAM 3.38 G: 3; .375 INJECTION, POWDER, LYOPHILIZED, FOR SOLUTION INTRAVENOUS; PARENTERAL at 12:15

## 2020-01-01 RX ADMIN — Medication 100 MCG/HR: at 17:22

## 2020-01-01 RX ADMIN — POTASSIUM CHLORIDE 40 MEQ: 29.8 INJECTION, SOLUTION INTRAVENOUS at 08:08

## 2020-01-01 RX ADMIN — ASPIRIN 81 MG 81 MG: 81 TABLET ORAL at 05:04

## 2020-01-01 RX ADMIN — LORAZEPAM 2 MG: 2 INJECTION INTRAMUSCULAR; INTRAVENOUS at 02:30

## 2020-01-01 RX ADMIN — SODIUM BICARBONATE 100 MEQ: 84 INJECTION, SOLUTION INTRAVENOUS at 17:30

## 2020-01-01 RX ADMIN — MAGNESIUM HYDROXIDE 30 ML: 400 SUSPENSION ORAL at 17:11

## 2020-01-01 RX ADMIN — ACETAMINOPHEN 650 MG: 325 TABLET, FILM COATED ORAL at 04:11

## 2020-01-01 RX ADMIN — LINEZOLID 600 MG: 600 INJECTION, SOLUTION INTRAVENOUS at 05:15

## 2020-01-01 RX ADMIN — FAMOTIDINE 20 MG: 10 INJECTION, SOLUTION INTRAVENOUS at 17:01

## 2020-01-01 RX ADMIN — HEPARIN SODIUM 1450 UNITS/HR: 5000 INJECTION, SOLUTION INTRAVENOUS at 06:50

## 2020-01-01 RX ADMIN — POTASSIUM CHLORIDE 20 MEQ: 14.9 INJECTION, SOLUTION INTRAVENOUS at 20:09

## 2020-01-01 RX ADMIN — FENTANYL CITRATE 100 MCG: 50 INJECTION INTRAMUSCULAR; INTRAVENOUS at 10:31

## 2020-01-01 RX ADMIN — ONDANSETRON 4 MG: 2 INJECTION INTRAMUSCULAR; INTRAVENOUS at 21:26

## 2020-01-01 RX ADMIN — AMIODARONE HYDROCHLORIDE 150 MG: 1.5 INJECTION, SOLUTION INTRAVENOUS at 04:50

## 2020-01-01 RX ADMIN — ETOMIDATE 15 MG: 2 INJECTION INTRAVENOUS at 17:24

## 2020-01-01 RX ADMIN — HUMAN ALBUMIN MICROSPHERES AND PERFLUTREN 3 ML: 10; .22 INJECTION, SOLUTION INTRAVENOUS at 13:27

## 2020-01-01 RX ADMIN — FAMOTIDINE 20 MG: 10 INJECTION, SOLUTION INTRAVENOUS at 17:33

## 2020-01-01 RX ADMIN — Medication 100 MG: at 09:10

## 2020-01-01 RX ADMIN — LORAZEPAM 2 MG: 2 INJECTION INTRAMUSCULAR; INTRAVENOUS at 20:51

## 2020-01-01 RX ADMIN — ACETAMINOPHEN 650 MG: 325 TABLET, FILM COATED ORAL at 09:07

## 2020-01-01 RX ADMIN — LORAZEPAM 2 MG: 2 INJECTION INTRAMUSCULAR; INTRAVENOUS at 04:51

## 2020-01-01 RX ADMIN — LORAZEPAM 2 MG: 2 INJECTION INTRAMUSCULAR; INTRAVENOUS at 06:11

## 2020-01-01 RX ADMIN — HEPARIN SODIUM: 1000 INJECTION, SOLUTION INTRAVENOUS; SUBCUTANEOUS at 15:27

## 2020-01-01 RX ADMIN — KETAMINE HYDROCHLORIDE 50 MG: 50 INJECTION INTRAMUSCULAR; INTRAVENOUS at 15:59

## 2020-01-01 RX ADMIN — HEPARIN SODIUM: 1000 INJECTION, SOLUTION INTRAVENOUS; SUBCUTANEOUS at 16:54

## 2020-01-01 RX ADMIN — POTASSIUM CHLORIDE 20 MEQ: 14.9 INJECTION, SOLUTION INTRAVENOUS at 09:22

## 2020-01-01 RX ADMIN — CALCIUM CHLORIDE 500 MG: 100 INJECTION INTRAVENOUS; INTRAVENTRICULAR at 14:14

## 2020-01-01 RX ADMIN — PIPERACILLIN AND TAZOBACTAM 3.38 G: 3; .375 INJECTION, POWDER, LYOPHILIZED, FOR SOLUTION INTRAVENOUS; PARENTERAL at 21:01

## 2020-01-01 RX ADMIN — ESMOLOL HYDROCHLORIDE 20 MG: 100 INJECTION, SOLUTION INTRAVENOUS at 13:16

## 2020-01-01 RX ADMIN — PROPOFOL 20 MCG/KG/MIN: 10 INJECTION, EMULSION INTRAVENOUS at 02:55

## 2020-01-01 RX ADMIN — FENTANYL CITRATE 100 MCG: 50 INJECTION INTRAMUSCULAR; INTRAVENOUS at 08:46

## 2020-01-01 RX ADMIN — FUROSEMIDE 20 MG: 10 INJECTION, SOLUTION INTRAMUSCULAR; INTRAVENOUS at 07:52

## 2020-01-01 RX ADMIN — Medication 200 MCG/HR: at 23:49

## 2020-01-01 RX ADMIN — ATORVASTATIN CALCIUM 40 MG: 40 TABLET, FILM COATED ORAL at 17:33

## 2020-01-01 RX ADMIN — SODIUM CHLORIDE 500 ML: 9 INJECTION, SOLUTION INTRAVENOUS at 08:06

## 2020-01-01 RX ADMIN — MIDAZOLAM HYDROCHLORIDE 2 MG: 1 INJECTION, SOLUTION INTRAMUSCULAR; INTRAVENOUS at 15:12

## 2020-01-01 RX ADMIN — FENTANYL CITRATE 100 MCG: 50 INJECTION INTRAMUSCULAR; INTRAVENOUS at 16:46

## 2020-01-01 RX ADMIN — HEPARIN SODIUM 7000 UNITS: 1000 INJECTION, SOLUTION INTRAVENOUS; SUBCUTANEOUS at 13:40

## 2020-01-01 RX ADMIN — DEXMEDETOMIDINE HYDROCHLORIDE 0.5 MCG/KG/HR: 100 INJECTION, SOLUTION INTRAVENOUS at 06:12

## 2020-01-01 RX ADMIN — FAMOTIDINE 20 MG: 20 TABLET, FILM COATED ORAL at 17:05

## 2020-01-01 RX ADMIN — METOPROLOL TARTRATE 25 MG: 25 TABLET, FILM COATED ORAL at 19:54

## 2020-01-01 RX ADMIN — EPINEPHRINE 1 MG: 0.1 INJECTION, SOLUTION ENDOTRACHEAL; INTRACARDIAC; INTRAVENOUS at 14:42

## 2020-01-01 RX ADMIN — DEXMEDETOMIDINE HYDROCHLORIDE 0.8 MCG/KG/HR: 100 INJECTION, SOLUTION INTRAVENOUS at 08:40

## 2020-01-01 RX ADMIN — SODIUM CHLORIDE, SODIUM LACTATE, POTASSIUM CHLORIDE, AND CALCIUM CHLORIDE 1000 ML: .6; .31; .03; .02 INJECTION, SOLUTION INTRAVENOUS at 17:45

## 2020-01-01 RX ADMIN — Medication 50 MCG/HR: at 21:15

## 2020-01-01 RX ADMIN — DOCUSATE SODIUM 50 MG AND SENNOSIDES 8.6 MG 2 TABLET: 8.6; 5 TABLET, FILM COATED ORAL at 17:11

## 2020-01-01 RX ADMIN — SODIUM CHLORIDE: 9 INJECTION, SOLUTION INTRAVENOUS at 15:23

## 2020-01-01 RX ADMIN — ASPIRIN 324 MG: 81 TABLET, CHEWABLE ORAL at 15:51

## 2020-01-01 RX ADMIN — FAMOTIDINE 20 MG: 10 INJECTION, SOLUTION INTRAVENOUS at 05:01

## 2020-01-01 RX ADMIN — PIPERACILLIN AND TAZOBACTAM 3.38 G: 3; .375 INJECTION, POWDER, LYOPHILIZED, FOR SOLUTION INTRAVENOUS; PARENTERAL at 11:30

## 2020-01-01 RX ADMIN — DEXTROSE MONOHYDRATE 0.12 MCG/KG/MIN: 50 INJECTION, SOLUTION INTRAVENOUS at 23:09

## 2020-01-01 RX ADMIN — BISACODYL 10 MG: 10 SUPPOSITORY RECTAL at 07:40

## 2020-01-01 RX ADMIN — IPRATROPIUM BROMIDE AND ALBUTEROL SULFATE 3 ML: .5; 3 SOLUTION RESPIRATORY (INHALATION) at 11:17

## 2020-01-01 RX ADMIN — Medication 100 MG: at 05:34

## 2020-01-01 RX ADMIN — NOREPINEPHRINE BITARTRATE 30 MCG/MIN: 1 INJECTION INTRAVENOUS at 16:34

## 2020-01-01 RX ADMIN — KETAMINE HYDROCHLORIDE 50 MG: 50 INJECTION INTRAMUSCULAR; INTRAVENOUS at 16:10

## 2020-01-01 RX ADMIN — SODIUM CHLORIDE, POTASSIUM CHLORIDE, SODIUM LACTATE AND CALCIUM CHLORIDE 1000 ML: 600; 310; 30; 20 INJECTION, SOLUTION INTRAVENOUS at 17:45

## 2020-01-01 RX ADMIN — IPRATROPIUM BROMIDE AND ALBUTEROL SULFATE 3 ML: .5; 3 SOLUTION RESPIRATORY (INHALATION) at 23:00

## 2020-01-01 RX ADMIN — TICAGRELOR 90 MG: 90 TABLET ORAL at 17:11

## 2020-01-01 RX ADMIN — IPRATROPIUM BROMIDE AND ALBUTEROL SULFATE 3 ML: .5; 3 SOLUTION RESPIRATORY (INHALATION) at 19:00

## 2020-01-01 RX ADMIN — CALCIUM CHLORIDE 1 G: 100 INJECTION INTRAVENOUS; INTRAVENTRICULAR at 16:23

## 2020-01-01 RX ADMIN — CEFAZOLIN 2000 MG: 330 INJECTION, POWDER, FOR SOLUTION INTRAMUSCULAR; INTRAVENOUS at 15:39

## 2020-01-01 RX ADMIN — TICAGRELOR 90 MG: 90 TABLET ORAL at 05:35

## 2020-01-01 RX ADMIN — DEXMEDETOMIDINE HYDROCHLORIDE 0.5 MCG/KG/HR: 100 INJECTION, SOLUTION INTRAVENOUS at 13:07

## 2020-01-01 RX ADMIN — Medication 100 MG: at 05:04

## 2020-01-01 RX ADMIN — CAPTOPRIL 6.25 MG: 12.5 TABLET ORAL at 08:41

## 2020-01-01 RX ADMIN — CALCIUM CHLORIDE 1000 MG: 100 INJECTION, SOLUTION INTRAVENOUS at 09:30

## 2020-01-01 RX ADMIN — HEPARIN SODIUM: 1000 INJECTION, SOLUTION INTRAVENOUS; SUBCUTANEOUS at 14:42

## 2020-01-01 RX ADMIN — Medication 50 MEQ: at 16:00

## 2020-01-01 RX ADMIN — HEPARIN SODIUM 1450 UNITS/HR: 5000 INJECTION, SOLUTION INTRAVENOUS at 00:34

## 2020-01-01 RX ADMIN — ONDANSETRON 4 MG: 2 INJECTION INTRAMUSCULAR; INTRAVENOUS at 20:24

## 2020-01-01 RX ADMIN — LIDOCAINE HYDROCHLORIDE 2 ML: 10 INJECTION, SOLUTION EPIDURAL; INFILTRATION; INTRACAUDAL at 04:24

## 2020-01-01 RX ADMIN — SODIUM CHLORIDE 250 ML: 9 INJECTION, SOLUTION INTRAVENOUS at 21:14

## 2020-01-01 RX ADMIN — TICAGRELOR 90 MG: 90 TABLET ORAL at 18:36

## 2020-01-01 RX ADMIN — FENTANYL CITRATE 100 MCG: 50 INJECTION INTRAMUSCULAR; INTRAVENOUS at 03:56

## 2020-01-01 RX ADMIN — MIDAZOLAM HYDROCHLORIDE 2 MG: 1 INJECTION, SOLUTION INTRAMUSCULAR; INTRAVENOUS at 16:12

## 2020-01-01 RX ADMIN — DOCUSATE SODIUM 50 MG AND SENNOSIDES 8.6 MG 2 TABLET: 8.6; 5 TABLET, FILM COATED ORAL at 05:14

## 2020-01-01 RX ADMIN — TICAGRELOR 90 MG: 90 TABLET ORAL at 17:46

## 2020-01-01 RX ADMIN — ROCURONIUM BROMIDE 50 MG: 10 INJECTION, SOLUTION INTRAVENOUS at 13:19

## 2020-01-01 RX ADMIN — EPINEPHRINE 0.15 MCG/KG/MIN: 1 INJECTION INTRAMUSCULAR; INTRAVENOUS; SUBCUTANEOUS at 14:25

## 2020-01-01 RX ADMIN — FENTANYL CITRATE 100 MCG: 50 INJECTION INTRAMUSCULAR; INTRAVENOUS at 03:09

## 2020-01-01 RX ADMIN — IPRATROPIUM BROMIDE AND ALBUTEROL SULFATE 3 ML: .5; 3 SOLUTION RESPIRATORY (INHALATION) at 07:00

## 2020-01-01 RX ADMIN — FENTANYL CITRATE 100 MCG: 50 INJECTION INTRAMUSCULAR; INTRAVENOUS at 00:23

## 2020-01-01 RX ADMIN — FUROSEMIDE 20 MG: 10 INJECTION, SOLUTION INTRAMUSCULAR; INTRAVENOUS at 05:05

## 2020-01-01 RX ADMIN — FENTANYL CITRATE 100 MCG: 50 INJECTION INTRAMUSCULAR; INTRAVENOUS at 09:57

## 2020-01-01 RX ADMIN — FUROSEMIDE 20 MG: 10 INJECTION, SOLUTION INTRAMUSCULAR; INTRAVENOUS at 17:00

## 2020-01-01 RX ADMIN — NITROGLYCERIN 10 MCG/MIN: 20 INJECTION INTRAVENOUS at 18:55

## 2020-01-01 RX ADMIN — PROPOFOL 20 MG: 10 INJECTION, EMULSION INTRAVENOUS at 16:26

## 2020-01-01 RX ADMIN — FENTANYL CITRATE 100 MCG: 50 INJECTION INTRAMUSCULAR; INTRAVENOUS at 03:57

## 2020-01-01 RX ADMIN — ACETAMINOPHEN 650 MG: 325 TABLET, FILM COATED ORAL at 13:12

## 2020-01-01 RX ADMIN — ETOMIDATE 20 MG: 2 INJECTION INTRAVENOUS at 01:50

## 2020-01-01 RX ADMIN — AMPICILLIN AND SULBACTAM 3 G: 2; 1 INJECTION, POWDER, FOR SOLUTION INTRAVENOUS at 04:39

## 2020-01-01 RX ADMIN — LIDOCAINE HYDROCHLORIDE: 20 INJECTION, SOLUTION INFILTRATION; PERINEURAL at 13:55

## 2020-01-01 RX ADMIN — ATORVASTATIN CALCIUM 40 MG: 40 TABLET, FILM COATED ORAL at 19:55

## 2020-01-01 RX ADMIN — FUROSEMIDE 20 MG: 10 INJECTION, SOLUTION INTRAMUSCULAR; INTRAVENOUS at 04:40

## 2020-01-01 RX ADMIN — PIPERACILLIN AND TAZOBACTAM 3.38 G: 3; .375 INJECTION, POWDER, LYOPHILIZED, FOR SOLUTION INTRAVENOUS; PARENTERAL at 00:52

## 2020-01-01 RX ADMIN — FAMOTIDINE 20 MG: 20 TABLET, FILM COATED ORAL at 05:14

## 2020-01-01 RX ADMIN — LORAZEPAM 1 MG: 2 INJECTION INTRAMUSCULAR; INTRAVENOUS at 20:24

## 2020-01-01 RX ADMIN — CALCIUM CHLORIDE 1000 MG: 100 INJECTION, SOLUTION INTRAVENOUS at 08:54

## 2020-01-01 RX ADMIN — VASOPRESSIN 1 UNITS: 20 INJECTION INTRAVENOUS at 14:42

## 2020-01-01 RX ADMIN — DOBUTAMINE HYDROCHLORIDE 2.5 MCG/KG/MIN: 100 INJECTION INTRAVENOUS at 20:16

## 2020-01-01 RX ADMIN — FENTANYL CITRATE 100 MCG: 50 INJECTION INTRAMUSCULAR; INTRAVENOUS at 04:40

## 2020-01-01 RX ADMIN — NOREPINEPHRINE BITARTRATE 8 MG: 1 INJECTION, SOLUTION, CONCENTRATE INTRAVENOUS at 13:55

## 2020-01-01 RX ADMIN — DEXTROSE MONOHYDRATE 50 ML: 25 INJECTION, SOLUTION INTRAVENOUS at 16:30

## 2020-01-01 RX ADMIN — FAMOTIDINE 20 MG: 10 INJECTION, SOLUTION INTRAVENOUS at 19:54

## 2020-01-01 RX ADMIN — FENTANYL CITRATE 100 MCG: 50 INJECTION INTRAMUSCULAR; INTRAVENOUS at 09:12

## 2020-01-01 RX ADMIN — MAGNESIUM SULFATE IN WATER 2 G: 40 INJECTION, SOLUTION INTRAVENOUS at 08:07

## 2020-01-01 RX ADMIN — POTASSIUM CHLORIDE 20 MEQ: 14.9 INJECTION, SOLUTION INTRAVENOUS at 11:30

## 2020-01-01 RX ADMIN — CALCIUM CHLORIDE 1 G: 100 INJECTION, SOLUTION INTRAVENOUS at 16:23

## 2020-01-01 RX ADMIN — CALCIUM CHLORIDE 1 G: 100 INJECTION, SOLUTION INTRAVENOUS at 16:01

## 2020-01-01 RX ADMIN — DEXMEDETOMIDINE HYDROCHLORIDE 0.8 MCG/KG/HR: 100 INJECTION, SOLUTION INTRAVENOUS at 22:30

## 2020-01-01 RX ADMIN — EPINEPHRINE 10 MCG: 1 INJECTION INTRAMUSCULAR; INTRAVENOUS; SUBCUTANEOUS at 14:29

## 2020-01-01 RX ADMIN — POTASSIUM CHLORIDE 40 MEQ: 1500 TABLET, EXTENDED RELEASE ORAL at 12:50

## 2020-01-01 RX ADMIN — HEPARIN SODIUM 1200 UNITS/HR: 5000 INJECTION, SOLUTION INTRAVENOUS at 02:51

## 2020-01-01 RX ADMIN — NITROGLYCERIN 10 ML: 20 INJECTION INTRAVENOUS at 13:55

## 2020-01-01 RX ADMIN — AMPICILLIN AND SULBACTAM 3 G: 2; 1 INJECTION, POWDER, FOR SOLUTION INTRAVENOUS at 11:13

## 2020-01-01 RX ADMIN — Medication 100 MEQ: at 17:30

## 2020-01-01 RX ADMIN — PROPOFOL 20 MCG/KG/MIN: 10 INJECTION, EMULSION INTRAVENOUS at 12:58

## 2020-01-01 RX ADMIN — AMPICILLIN AND SULBACTAM 3 G: 2; 1 INJECTION, POWDER, FOR SOLUTION INTRAVENOUS at 19:55

## 2020-01-01 RX ADMIN — PROPOFOL 20 MCG/KG/MIN: 10 INJECTION, EMULSION INTRAVENOUS at 11:35

## 2020-01-01 RX ADMIN — FUROSEMIDE 20 MG: 10 INJECTION, SOLUTION INTRAMUSCULAR; INTRAVENOUS at 05:34

## 2020-01-01 RX ADMIN — MORPHINE SULFATE 4 MG: 4 INJECTION INTRAVENOUS at 00:18

## 2020-01-01 RX ADMIN — ATORVASTATIN CALCIUM 40 MG: 40 TABLET, FILM COATED ORAL at 17:27

## 2020-01-01 RX ADMIN — LINEZOLID 600 MG: 600 INJECTION, SOLUTION INTRAVENOUS at 22:13

## 2020-01-01 RX ADMIN — SODIUM CHLORIDE, SODIUM LACTATE, POTASSIUM CHLORIDE, AND CALCIUM CHLORIDE 1000 ML: .6; .31; .03; .02 INJECTION, SOLUTION INTRAVENOUS at 16:15

## 2020-01-01 RX ADMIN — FENTANYL CITRATE 100 MCG: 50 INJECTION INTRAMUSCULAR; INTRAVENOUS at 20:04

## 2020-01-01 RX ADMIN — FAMOTIDINE 20 MG: 10 INJECTION, SOLUTION INTRAVENOUS at 05:34

## 2020-01-01 RX ADMIN — MIDAZOLAM 1 MG: 1 INJECTION INTRAMUSCULAR; INTRAVENOUS at 15:35

## 2020-01-01 RX ADMIN — MAGNESIUM SULFATE 2 G: 2 INJECTION INTRAVENOUS at 16:10

## 2020-01-01 RX ADMIN — TICAGRELOR 90 MG: 90 TABLET ORAL at 04:45

## 2020-01-01 RX ADMIN — Medication 100 MG: at 17:46

## 2020-01-01 RX ADMIN — NOREPINEPHRINE BITARTRATE 8 MG: 1 INJECTION, SOLUTION, CONCENTRATE INTRAVENOUS at 17:18

## 2020-01-01 RX ADMIN — HEPARIN SODIUM: 5000 INJECTION, SOLUTION INTRAVENOUS; SUBCUTANEOUS at 00:32

## 2020-01-01 RX ADMIN — KETAMINE HYDROCHLORIDE 25 MG: 50 INJECTION INTRAMUSCULAR; INTRAVENOUS at 15:50

## 2020-01-01 RX ADMIN — PROPOFOL 30 MCG/KG/MIN: 10 INJECTION, EMULSION INTRAVENOUS at 17:10

## 2020-01-01 RX ADMIN — ASPIRIN 81 MG 81 MG: 81 TABLET ORAL at 04:43

## 2020-01-01 RX ADMIN — TICAGRELOR 90 MG: 90 TABLET ORAL at 09:10

## 2020-01-01 RX ADMIN — MIDAZOLAM HYDROCHLORIDE 2 MG: 1 INJECTION, SOLUTION INTRAMUSCULAR; INTRAVENOUS at 16:24

## 2020-01-01 RX ADMIN — POTASSIUM CHLORIDE 10 MEQ: 7.46 INJECTION, SOLUTION INTRAVENOUS at 01:25

## 2020-01-01 RX ADMIN — LORAZEPAM 2 MG: 2 INJECTION INTRAMUSCULAR; INTRAVENOUS at 01:43

## 2020-01-01 RX ADMIN — IPRATROPIUM BROMIDE AND ALBUTEROL SULFATE 3 ML: .5; 3 SOLUTION RESPIRATORY (INHALATION) at 15:25

## 2020-01-01 RX ADMIN — AMPICILLIN AND SULBACTAM 3 G: 2; 1 INJECTION, POWDER, FOR SOLUTION INTRAVENOUS at 17:06

## 2020-01-01 RX ADMIN — POTASSIUM CHLORIDE 40 MEQ: 29.8 INJECTION, SOLUTION INTRAVENOUS at 07:52

## 2020-01-01 RX ADMIN — DOCUSATE SODIUM 50 MG AND SENNOSIDES 8.6 MG 2 TABLET: 8.6; 5 TABLET, FILM COATED ORAL at 05:04

## 2020-01-01 RX ADMIN — LORAZEPAM 2 MG: 2 INJECTION INTRAMUSCULAR; INTRAVENOUS at 07:38

## 2020-01-01 RX ADMIN — FENTANYL CITRATE 100 MCG: 50 INJECTION INTRAMUSCULAR; INTRAVENOUS at 01:25

## 2020-01-01 RX ADMIN — VERAPAMIL HYDROCHLORIDE 2.5 MG: 2.5 INJECTION, SOLUTION INTRAVENOUS at 13:55

## 2020-01-01 RX ADMIN — PROPOFOL 20 MCG/KG/MIN: 10 INJECTION, EMULSION INTRAVENOUS at 07:40

## 2020-01-01 RX ADMIN — DOCUSATE SODIUM 50 MG AND SENNOSIDES 8.6 MG 2 TABLET: 8.6; 5 TABLET, FILM COATED ORAL at 17:05

## 2020-01-01 RX ADMIN — PIPERACILLIN AND TAZOBACTAM 3.38 G: 3; .375 INJECTION, POWDER, LYOPHILIZED, FOR SOLUTION INTRAVENOUS; PARENTERAL at 05:04

## 2020-01-01 RX ADMIN — ACETAMINOPHEN 650 MG: 325 TABLET, FILM COATED ORAL at 20:17

## 2020-01-01 RX ADMIN — HEPARIN SODIUM: 1000 INJECTION, SOLUTION INTRAVENOUS; SUBCUTANEOUS at 13:55

## 2020-01-01 RX ADMIN — PROPOFOL 30 MCG/KG/MIN: 10 INJECTION, EMULSION INTRAVENOUS at 08:41

## 2020-01-01 RX ADMIN — PIPERACILLIN AND TAZOBACTAM 3.38 G: 3; .375 INJECTION, POWDER, LYOPHILIZED, FOR SOLUTION INTRAVENOUS; PARENTERAL at 13:12

## 2020-01-01 RX ADMIN — FENTANYL CITRATE 50 MCG: 50 INJECTION INTRAMUSCULAR; INTRAVENOUS at 13:07

## 2020-01-01 RX ADMIN — ASPIRIN 81 MG 81 MG: 81 TABLET ORAL at 05:14

## 2020-01-01 RX ADMIN — DOCUSATE SODIUM 50 MG AND SENNOSIDES 8.6 MG 2 TABLET: 8.6; 5 TABLET, FILM COATED ORAL at 04:41

## 2020-01-01 RX ADMIN — LINEZOLID 600 MG: 600 INJECTION, SOLUTION INTRAVENOUS at 17:37

## 2020-01-01 RX ADMIN — PIPERACILLIN AND TAZOBACTAM 3.38 G: 3; .375 INJECTION, POWDER, LYOPHILIZED, FOR SOLUTION INTRAVENOUS; PARENTERAL at 12:44

## 2020-01-01 RX ADMIN — CALCIUM CHLORIDE 1000 MG: 100 INJECTION, SOLUTION INTRAVENOUS at 21:12

## 2020-01-01 RX ADMIN — Medication 100 MG: at 09:57

## 2020-01-01 RX ADMIN — DOCUSATE SODIUM 50 MG AND SENNOSIDES 8.6 MG 2 TABLET: 8.6; 5 TABLET, FILM COATED ORAL at 19:54

## 2020-01-01 ASSESSMENT — ENCOUNTER SYMPTOMS
FEVER: 0
DIZZINESS: 0
FLANK PAIN: 0
NEAR-SYNCOPE: 0
DEPRESSION: 0
HEARTBURN: 0
ORTHOPNEA: 0
CONSTIPATION: 0
ABDOMINAL PAIN: 0
WEIGHT LOSS: 0
NAUSEA: 0
SHORTNESS OF BREATH: 0
PND: 0
SYNCOPE: 0
WEIGHT GAIN: 0
CLAUDICATION: 0
ALTERED MENTAL STATUS: 0
BACK PAIN: 0
VOMITING: 0
DIARRHEA: 0
IRREGULAR HEARTBEAT: 0
PALPITATIONS: 0
COUGH: 0
DECREASED APPETITE: 0
BLURRED VISION: 0
DYSPNEA ON EXERTION: 0

## 2020-01-01 ASSESSMENT — PULMONARY FUNCTION TESTS
FVC: 1.1
FVC: 1.3

## 2020-01-01 ASSESSMENT — FIBROSIS 4 INDEX
FIB4 SCORE: 13.24
FIB4 SCORE: 10.81
FIB4 SCORE: 6.28
FIB4 SCORE: 10.81
FIB4 SCORE: 3.2

## 2020-01-01 ASSESSMENT — PAIN SCALES - GENERAL: PAIN_LEVEL: 0

## 2020-01-01 ASSESSMENT — PATIENT HEALTH QUESTIONNAIRE - PHQ9
1. LITTLE INTEREST OR PLEASURE IN DOING THINGS: NOT AT ALL
2. FEELING DOWN, DEPRESSED, IRRITABLE, OR HOPELESS: NOT AT ALL
SUM OF ALL RESPONSES TO PHQ9 QUESTIONS 1 AND 2: 0

## 2020-04-10 NOTE — TELEPHONE ENCOUNTER
----- Message from Nena Kimble R.N. sent at 4/10/2020  2:10 PM PDT -----  Regarding: FW: Dr cele Curtis consult  To ADD,    Please call Dr. Rivera - [facility], thank you!  ----- Message -----  From: Christi Renee, Med Ass't  Sent: 4/10/2020  12:01 PM PDT  To: Nena Kimble R.N.  Subject: Dr cele jackson                                 Hello, I have Dr. Rivera from [facility] wanting to speak to the ADD in regards to patient. He would like a call back at [phone]

## 2020-04-15 NOTE — TELEPHONE ENCOUNTER
Collette Brito R.N.             Pt is scheduled for an on-demand appointment on 05/05 but does have a question about recent hospitalization. Please call patient back at [phone].      Attempted call to [phone], but mailbox is full, unable to LVM. It doesn't appear he has been seen by a [facility] provider for over 3 years,and we don't have any records from pt's [facility] stay, so unsure if we would be able to help him with his questions. Faxed records request to [facility] at [phone]. Receipt confirmed.

## 2020-06-01 PROBLEM — I25.10 ASCVD (ARTERIOSCLEROTIC CARDIOVASCULAR DISEASE): Status: ACTIVE | Noted: 2020-01-01

## 2020-06-01 PROBLEM — I10 ESSENTIAL HYPERTENSION, BENIGN: Status: ACTIVE | Noted: 2020-01-01

## 2020-06-01 PROBLEM — I21.4 NSTEMI (NON-ST ELEVATED MYOCARDIAL INFARCTION) (HCC): Status: ACTIVE | Noted: 2020-01-01

## 2020-06-01 NOTE — PROGRESS NOTES
This encounter was conducted via ZOOM.  Verbal consent was obtained. Patient's identity was verified.    Cardiology Note    Chief Complaint   Patient presents with   • Coronary Artery Disease       History of Present Illness: Humberto Shannon is a 60 y.o. male PMH NSTEMI/CAD s/p CABG, HTN, GERD, OA with LBP, active tobacco who presents for initial visit.    Admit [facility] 4/2020 for nstemi and angina treated medically with dapt, statin, imdur, metoprolol succinate, ACEi, and nicotinue replacement for active tobacco abuse.      Recounts on presentation left shoulder pain radiating to chest and arm. Denies associated symptoms. Recalls he was watching TV. Lasting for hours until hospital presentation. Improved with nitroglycerin. Since hospital visit, occasional recurrence. Adds he also tires faster than usual with work around the house such as cleaning or yard work. He has noticed weakness with raising left arm. Discomfort also worse with lifting objects.     Review of Systems   Constitution: Positive for malaise/fatigue. Negative for decreased appetite, fever, weight gain and weight loss.   HENT: Negative for congestion and nosebleeds.    Eyes: Negative for blurred vision.   Cardiovascular: Positive for chest pain. Negative for claudication, dyspnea on exertion, irregular heartbeat, leg swelling, near-syncope, orthopnea, palpitations, paroxysmal nocturnal dyspnea and syncope.   Respiratory: Negative for cough and shortness of breath.    Endocrine: Negative for cold intolerance and heat intolerance.   Skin: Negative for rash.   Musculoskeletal: Positive for joint pain. Negative for back pain.   Gastrointestinal: Negative for abdominal pain, constipation, diarrhea, heartburn, melena, nausea and vomiting.   Genitourinary: Negative for dysuria, flank pain and hematuria.   Neurological: Negative for dizziness.   Psychiatric/Behavioral: Negative for altered mental status and depression.         Past Medical History:   Diagnosis Date    • Arthritis     ankles, hands   • Cataract    • High cholesterol    • Hypertension    • Myocardial infarct (HCC)     x 5         Past Surgical History:   Procedure Laterality Date   • CORONARY ARTERY BYPASS, 3  2008   • LUMBAR FUSION ANTERIOR  2003    L3,4,5         Current Outpatient Medications   Medication Sig Dispense Refill   • isosorbide mononitrate SR (IMDUR) 60 MG TABLET SR 24 HR Take 60 mg by mouth.     • clopidogrel (PLAVIX) 75 MG Tab Take 75 mg by mouth.     • celecoxib (CELEBREX) 200 MG Cap Take 200 mg by mouth 2 times a day.     • baclofen (LIORESAL) 10 MG Tab take 1 tablet by mouth four times a day with food or milk     • cyclobenzaprine (FLEXERIL) 10 MG Tab take 1 tablet by mouth three times a day if needed for SPASMS     • amoxicillin (AMOXIL) 500 MG Cap take 1 capsule by mouth three times a day for 10 days     • ibuprofen (MOTRIN) 800 MG Tab Take 800 mg by mouth 3 times a day.     • atorvastatin (LIPITOR) 80 MG tablet Take 80 mg by mouth.     • multivitamin (THERAGRAN) Tab Take 1 Tab by mouth every day.     • esomeprazole (NEXIUM) 20 MG capsule Take 20 mg by mouth every morning before breakfast.     • aspirin EC 81 MG EC tablet Take 1 Tab by mouth every day. 30 Tab 3   • metoprolol SR (TOPROL XL) 25 MG TABLET SR 24 HR Take 1 Tab by mouth every day. 30 Tab 11   • nitroGLYCERIN (NITROSTAT) 0.3 MG SL tablet Place 1 Tab under tongue as needed for Chest Pain. 100 Tab 11   • lisinopril (PRINIVIL) 5 MG Tab Take 1 Tab by mouth every day. (Patient not taking: Reported on 6/1/2020) 30 Tab 3     No current facility-administered medications for this visit.          No Known Allergies      No family history on file.      Social History     Socioeconomic History   • Marital status: Single     Spouse name: Not on file   • Number of children: Not on file   • Years of education: Not on file   • Highest education level: Not on file   Occupational History   • Not on file   Social Needs   • Financial resource strain:  "Not on file   • Food insecurity     Worry: Not on file     Inability: Not on file   • Transportation needs     Medical: Not on file     Non-medical: Not on file   Tobacco Use   • Smoking status: Current Every Day Smoker     Packs/day: 1.00     Years: 46.00     Pack years: 46.00     Types: Cigarettes   • Smokeless tobacco: Never Used   Substance and Sexual Activity   • Alcohol use: Yes     Comment: 3-4 beers a day   • Drug use: Not on file   • Sexual activity: Not on file   Lifestyle   • Physical activity     Days per week: Not on file     Minutes per session: Not on file   • Stress: Not on file   Relationships   • Social connections     Talks on phone: Not on file     Gets together: Not on file     Attends Anabaptist service: Not on file     Active member of club or organization: Not on file     Attends meetings of clubs or organizations: Not on file     Relationship status: Not on file   • Intimate partner violence     Fear of current or ex partner: Not on file     Emotionally abused: Not on file     Physically abused: Not on file     Forced sexual activity: Not on file   Other Topics Concern   • Not on file   Social History Narrative   • Not on file         Physical Exam:  Ambulatory Vitals  Ht 1.778 m (5' 10\")   Wt 84.8 kg (187 lb)    BP Readings from Last 4 Encounters:   06/24/16 156/80     Weight/BMI:   Vitals:    06/01/20 0856   Weight: 84.8 kg (187 lb)   Height: 1.778 m (5' 10\")    Body mass index is 26.83 kg/m².  Wt Readings from Last 4 Encounters:   06/01/20 84.8 kg (187 lb)   06/23/16 68.3 kg (150 lb 9.2 oz)       Physical Exam  Physical Exam:  Constitutional: Alert, no distress, well-groomed.  Skin: No rashes in visible areas.  Eye: Round. Conjunctiva clear, lids normal. No icterus.   ENMT: Lips pink without lesions, good dentition, moist mucous membranes. Phonation normal.  Neck: No masses, no thyromegaly. Moves freely without pain.  CV: Pulse as reported by patient  Respiratory: Unlabored respiratory " effort, no cough or audible wheeze  Psych: Alert and oriented x3, normal affect and mood.     Lab Data Review:  Lab Results   Component Value Date/Time    CHOLSTRLTOT 147 03/03/2005 04:30 AM    LDL 71 03/03/2005 04:30 AM    HDL 49 03/03/2005 04:30 AM    TRIGLYCERIDE 135 03/03/2005 04:30 AM       Lab Results   Component Value Date/Time    SODIUM 140 06/24/2016 03:00 AM    POTASSIUM 3.1 (L) 06/24/2016 03:00 AM    CHLORIDE 116 (H) 06/24/2016 03:00 AM    CO2 18 (L) 06/24/2016 03:00 AM    GLUCOSE 82 06/24/2016 03:00 AM    BUN 14 06/24/2016 03:00 AM    CREATININE 0.69 06/24/2016 03:00 AM    CREATININE 0.7 03/05/2005 05:15 AM     CrCl cannot be calculated (Patient's most recent lab result is older than the maximum 7 days allowed.).  Lab Results   Component Value Date/Time    ALKPHOSPHAT 32 06/23/2016 09:04 PM    ASTSGOT 23 06/23/2016 09:04 PM    ALTSGPT 23 06/23/2016 09:04 PM    TBILIRUBIN 0.5 06/23/2016 09:04 PM      Lab Results   Component Value Date/Time    WBC 5.2 06/24/2016 03:00 AM     No results found for: HBA1C  No components found for: TROP      Cardiac Imaging and Procedures Review:      EKG 6/2016 sinus, PVC, nonspecific T wave change  EKG 4/8/20 sinus, nonspecific T wave change    Nuclear spect 06/2016  CONCLUSIONS  Left ventricular ejection fraction is visually estimated to be 55%.  Normal transthoracic echocardiogram.   Normal inferior vena cava size and inspiratory collapse.  No prior study is available for comparison.   Diffuse hypokinesis with an ejection fraction of 38%.    TTE 6/24/2016  CONCLUSIONS  Left ventricular ejection fraction is visually estimated to be 55%.  Normal transthoracic echocardiogram.   Normal inferior vena cava size and inspiratory collapse.  No prior study is available for comparison.   Left Ventricle  Normal left ventricular chamber size. Normal left ventricular wall   thickness. Mildly reduced left ventricular systolic function. Left   ventricular ejection fraction is visually  estimated to be 55%. The   infero-septum and inferior wall of the left ventricle appears   hypokinetic.    TTE 4/09/20  -LVEF 50%, LV grade I diastolic dysfunction  -normal RV, no valvular pathology    Nuclear stress spect lexiscan outside record [facility] 4/7/20  -previous, small to mod size inferior and anteroapical wall infarct  -no ischemia  -normal LVEF 55%    Medical Decision Making:  Problem List Items Addressed This Visit     Coronary artery disease involving native coronary artery of native heart without angina pectoris    Relevant Medications    isosorbide mononitrate SR (IMDUR) 60 MG TABLET SR 24 HR    atorvastatin (LIPITOR) 80 MG tablet    NSTEMI (non-ST elevated myocardial infarction) (HCC)    Relevant Medications    isosorbide mononitrate SR (IMDUR) 60 MG TABLET SR 24 HR    atorvastatin (LIPITOR) 80 MG tablet    Other Relevant Orders    LIPID PANEL    Essential hypertension, benign    Relevant Medications    isosorbide mononitrate SR (IMDUR) 60 MG TABLET SR 24 HR    atorvastatin (LIPITOR) 80 MG tablet    ASCVD (arteriosclerotic cardiovascular disease)    Relevant Medications    isosorbide mononitrate SR (IMDUR) 60 MG TABLET SR 24 HR    atorvastatin (LIPITOR) 80 MG tablet        NSTEMI/CAD - DAPT 12 months. Continue metoprolol, imdur.    ascvd - continue statin. Repeat lipids. Goal LDL <70.    HTN - check BP next visit. Does not have BP cuff at home. Currently he is not taking lisinopril but may need to.    It was my pleasure to meet with Mr. Shannon.    40 min spent reviewing outside records.

## 2020-07-30 PROBLEM — I21.02 ACUTE ST ELEVATION MYOCARDIAL INFARCTION (STEMI) INVOLVING LEFT ANTERIOR DESCENDING (LAD) CORONARY ARTERY (HCC): Status: ACTIVE | Noted: 2020-01-01

## 2020-07-30 PROBLEM — N17.9 ACUTE KIDNEY INJURY (HCC): Status: ACTIVE | Noted: 2020-01-01

## 2020-07-30 PROBLEM — D72.829 LEUKOCYTOSIS: Status: ACTIVE | Noted: 2020-01-01

## 2020-07-30 PROBLEM — J96.01 ACUTE RESPIRATORY FAILURE WITH HYPOXIA (HCC): Status: ACTIVE | Noted: 2020-01-01

## 2020-07-30 PROBLEM — R57.0 CARDIOGENIC SHOCK (HCC): Status: ACTIVE | Noted: 2020-01-01

## 2020-07-30 NOTE — ED PROVIDER NOTES
ER Provider Note     Scribed for Joe Spear M.D. by Neo Navas. 7/30/2020, 12:22 PM.    Primary Care Provider: Cal Johnston M.D. (Inactive)  Means of Arrival: Care flight   History obtained from: Patient  History limited by: None     CHIEF COMPLAINT  Chief Complaint   Patient presents with   • Shortness of Breath       HPI  Humberto Shannon is a 60 y.o. male who presents to the Emergency Department via care flight for shortness of breath. The patient was initially evaluated at [facility] emergency department for shortness of breath and left shoulder pain. On arrival to [facility], the patient already had a 22 gauge in his hand placed by EMS. He arrived to [facility] in severe respiratory distress and patient was unable to provide any history. Chest x-ray was ordered at the time which revealed pulmonary edema from possible CHF exacerbation. A right central femoral line was placed for access and was able to get a blood pressure in the 140's. BiPAP was attempted at [facility] but the patient kept slapping away the device. He was placed on a nitroglycerin drip at 80 mcg/min and transferred to [facility] for higher level of care.EMS says their ventilator was unable to keep up with the patient's respiratory rate, which led to the patient being intubated prior to arrival to [facility]. EMS reports prior history of myocardial infarction and CABG. Patient takes Plavix. At [facility], the patients lactic acid was 3.6 with an ABG of 7.07     I was wearing appropriate PPE every time I entered the room. Patient was intubated on arrival.    REVIEW OF SYSTEMS  See HPI for further details. All other systems are negative.     PAST MEDICAL HISTORY   has a past medical history of Arthritis, Cataract, High cholesterol, Hypertension, and Myocardial infarct (HCC).    SURGICAL HISTORY   has a past surgical history that includes coronary artery bypass, 3 (2008) and lumbar fusion anterior (2003).    SOCIAL HISTORY  Social History     Tobacco Use   •  "Smoking status: Current Every Day Smoker     Packs/day: 1.00     Years: 46.00     Pack years: 46.00     Types: Cigarettes   • Smokeless tobacco: Never Used   Substance Use Topics   • Alcohol use: Yes     Comment: 3-4 beers a day   • Drug use: Not reported      Social History     Substance and Sexual Activity   Drug Use Not reported       FAMILY HISTORY  No family history on file.    CURRENT MEDICATIONS  Home Medications     Reviewed by Vincent Calderon (Pharmacy Tech) on 07/30/20 at 1305  Med List Status: Complete   Medication Last Dose Status   aspirin EC 81 MG EC tablet UNK Active   atorvastatin (LIPITOR) 80 MG tablet UNK Active   baclofen (LIORESAL) 10 MG Tab UNK Active   celecoxib (CELEBREX) 200 MG Cap UNK Active   clopidogrel (PLAVIX) 75 MG Tab UNK Active   cyclobenzaprine (FLEXERIL) 10 MG Tab UNK Active   esomeprazole (NEXIUM) 20 MG capsule UNK Active   ibuprofen (MOTRIN) 800 MG Tab UNK Active   isosorbide mononitrate SR (IMDUR) 30 MG TABLET SR 24 HR UNK Active   metoprolol SR (TOPROL XL) 25 MG TABLET SR 24 HR UNK Active   nitroGLYCERIN (NITROSTAT) 0.3 MG SL tablet UNK Active                ALLERGIES  No Known Allergies    PHYSICAL EXAM  VITAL SIGNS: Pulse (!) 124   Resp (!) 71   Ht 1.778 m (5' 10\")   Wt 85.2 kg (187 lb 14.4 oz)   SpO2 88%   BMI 26.96 kg/m²      Constitutional: Intubated and sedated  HENT: No signs of trauma, Bilateral external ears normal, Nose normal.   Eyes: Pupils are equal and reactive, Conjunctiva normal, Non-icteric.   Neck No stridor.   Lymphatic: No lymphadenopathy noted.   Cardiovascular: Tachycardic rate and regular rhythm, no palpable thrill. Hypertensive  Thorax & Lungs: Rales up through the top of the lungs   Abdomen: Bowel sounds normal, Soft,, No masses, No pulsatile masses. No peritoneal signs.  Skin: Warm, Dry, No erythema, No rash.   Extremities: Intact distal pulses, No edema, No tenderness, No cyanosis.  Musculoskeletal: Good range of motion in all major joints.  "   Neurologic: Sedated  Psychiatric: Sedated      DIAGNOSTIC STUDIES / PROCEDURES    EKG Interpretation:  Interpreted by me    12:38 PM  12 Lead EKG interpreted by me to show:  Inus tachycardia rhythm  Rate 136  Axis: Rightward  Large S waves anteriorly  Questionable elevation in 2,3, and 4  T wave inversion in 3 and AVF  My impression of this EKG: Possible STEMI. Cardiology was consulted, who said they will assess the patient and an emergent echo has been ordered.     LABS  Labs Reviewed   ACTIVATED CLOTTING TIME, ISTAT - Abnormal; Notable for the following components:       Result Value    Istat Activated Clotting Time 175 (*)     All other components within normal limits   ACTIVATED CLOTTING TIME, ISTAT - Abnormal; Notable for the following components:    Istat Activated Clotting Time 213 (*)     All other components within normal limits   ACTIVATED CLOTTING TIME, ISTAT - Abnormal; Notable for the following components:    Istat Activated Clotting Time 235 (*)     All other components within normal limits   ISTAT ARTERIAL BLOOD GAS - Abnormal; Notable for the following components:    Ph 7.168 (*)     Pco2 43.4 (*)     Po2 63 (*)     Tco2 17 (*)     S02 85 (*)     Hco3 15.8 (*)     BE -12 (*)     All other components within normal limits   TRIGLYCERIDE    Narrative:     While on propofol   COVID/SARS COV-2    Narrative:     Is patient being admitted?->Yes  Does this patient meet criteria for Rush/Cepheid per [facility]  Inpatient Workflow? (See workflow link below)->Yes  Expected turn around time?->[facility]ush (Cepheid 2-4 hours)  ** STAT for cath lab   SARS-COV-2, PCR (IN-HOUSE)    Narrative:     Is patient being admitted?->Yes  Does this patient meet criteria for Rush/Cepheid per [facility]  Inpatient Workflow? (See workflow link below)->Yes  Expected turn around time?->[facility] (Cepheid 2-4 hours)  ** STAT for cath lab   CBC WITH DIFFERENTIAL   BASIC METABOLIC PANEL   PROBRAIN NATRIURETIC PEPTIDE, NT   TROPONIN     All labs  reviewed by me.    RADIOLOGY  EC-ECHOCARDIOGRAM COMPLETE W/ CONT   Final Result      DX-CHEST-PORTABLE (1 VIEW)    (Results Pending)   CL-LEFT HEART CATHETERIZATION WITH POSSIBLE INTERVENTION    (Results Pending)      The radiologist's interpretation of all radiological studies have been reviewed by me.    COURSE & MEDICAL DECISION MAKING  Pertinent Labs & Imaging studies reviewed. (See chart for details)    This is a 60 y.o. male that presents severely ill and an EKG that is concerning for some ST elevation.  Do not believe meet STEMI criteria.  Cardiology was paged emergently.  Patient does appear to be in acute congestive heart failure based upon my bedside ultrasound.  We will continue the nitro drip.  Will return appropriate follow-up.  We will get basic labs including a BMP and troponin and then reassess the patient after this..     12:22 PM - Patient seen and examined at bedside. Bedside ultrasound was performed which showed B lines in the lungs all the way to the top of the lungs. It also showed poor ejection fraction.  Ordered DX-chest-portable, EC-Echocardiogram complete w/ cont, COVID/SARS, CBC with differential, CMP, BNP, Troponin, EKG.  Patient will be medicated with propofol injection, nitroglycerin 50 mg in D5W 250 ml infusion for his symptoms.     12:38 PM Paged Cardiology.     12:40 PM Spoke with Dr. Jaquez, Cardiology, about the patient's condition. Dr. Jaquez recommended a STAT echo because he was concerned about a possible MI. Dr. Jaquez will be coming down to see the patient. Dr. Jaquez will be taking the patient to the cath lab and will foreseeing his care from this point forward.     Patient was found to have a very concerning echo and was emergently taken to the Cath Lab.      FINAL IMPRESSION  1. Acute pulmonary edema (HCC)    2. Elevated troponin    3. Congestive heart failure, unspecified HF chronicity, unspecified heart failure type (HCC)     4. Critical care time of 45 minutes.    CRITICAL  CARE  The very real possibility of a deterioration of this patient's condition required the highest level of my preparedness for sudden, emergent intervention.  I provided critical care services, which included medication orders, frequent reevaluations of the patient's condition and response to treatment, ordering and reviewing test results, and discussing the case with various consultants.  The critical care time associated with the care of the patient was 45 minutes. Review chart for interventions. This time is exclusive of any other billable procedures.        INeo (Cr), am scribing for, and in the presence of, Joe Spear M.D..    Electronically signed by: Neo Navas (Cr), 7/30/2020    Joe SANTIAGO M.D. personally performed the services described in this documentation, as scribed by Neo Navas in my presence, and it is both accurate and complete.  C    The note accurately reflects work and decisions made by me.  Joe Spear M.D.  7/30/2020  4:14 PM

## 2020-07-30 NOTE — PROGRESS NOTES
Code team arrived at 1414, compressions in progress. Initial rhythm reported as Vtach, 1x epi 1 mg given prior to team arrival.   Compressions stopped at 1416, pulse palpable with BP on [place].   Impella connected at 1423.   RRT left at 1430 , , /72, Sa02 100%, pt remains on table with cath lab team and MD at bedside. Pending transfer to [facility] post procedure.

## 2020-07-30 NOTE — ED TRIAGE NOTES
"PT BIB EMS tx from [facility], per report pt arrived at the ER today C/O SOB.  PT was placed on bipap at [facility], flight transfer was arranged, when fight arrived, pt was over breathing the ability of the ruchi on the aircraft, pt was taken back into the ER and intubated.  PT arrived to the ER at [facility] intubated, nitro drip running, antunez in place, central line right femoral, and 2 peripheral IV's. PT sedated at this time.  PT  Was placed on sedation and nitro per MD order.     Chief Complaint   Patient presents with   • Shortness of Breath     BP (!) 177/108   Pulse (!) 134   Temp 36.9 °C (98.4 °F) (Temporal)   Resp 20   Ht 1.778 m (5' 10\")   Wt 85.2 kg (187 lb 14.4 oz)   SpO2 (!) 86%     "

## 2020-07-30 NOTE — PROCEDURES
Cardiac Catheterization and Percutaneous Intervention Procedure Report    7/30/2020    Referring MD:     Indication for procedure:   ST elevation myocardial infarction  Cardiogenic shock    Procedures:  1.  Insertion of 14 Swiss sheath left common femoral artery.  2.  Insertion of left ventricular assist device, Impella  3.  PCI of LAD  4. Insertion of temporary external fem-fem bypass  5. Insertion of [place] catheter    Final diagnosis:   Successful PCI of LAD    Recommendations: Guideline directed medical therapy and risk factor management    Coronary arteriograms:  Left main: Very short left main  Left anterior descending: occluded in proximal to mid portion. Second diagonal is diffuse disease.  Left circumflex: luminal irregularities.   Right coronary: Diffuse mild disease,distal RCA has focal 90% disease co-dominant  Both vein grafts are occluded.  LIMA is atretic not developed    Procedure details:  60-year-old male patient presented to the emergency room with anterior ST elevation MI, acute pulmonary edema.  His initial blood pressures are high, placed on nitro drip.  When he arrived in the Cath Lab his pressures were in 90s.  Nitro drip was turned down.  A 6 Swiss sheath was placed in left groin because right femoral has central line.  Diagnostic angiogram was performed using JL4, JR4.  Patient has low blood pressures in 70s during diagnostic cath.  Then during the process to set up intervention, patient had ventricular fibrillation requiring CPR briefly for 2 to 3 minutes, he had 1 mg of epinephrine, had spontaneous circulation.  Received 3 shocks.  Then I decided to place an Impella, iliac angiogram performed which showed severe iliac artery disease.  However there was no time to get alternative access.  So I proceeded with placing Impella through the same site.  Arteriotomy was dilated and 14 Swiss sheath was placed with no significant difficulty.  A pigtail catheter was advanced to LV apex, it  was exchanged with Platinum Plus wire.  Impella catheter was prepped in a proper manner, advanced to LV, good flow was achieved.  Then I proceeded with intervention.  EBU 3.5 guide was used to engage left main.  Mid LAD is completely occluded, lesion complexity is high, lesion length was 60 mm, CHAPO 0 flow at the beginning, CHAPO-3 flow at the end.  His mid LAD is calcified, I used Fielder FC wire and able to cross the mid LAD with difficulty, lesion was dilated with 1.2 mm  balloon then 2.0 x 15 compliant balloon.  Then a 2.5 x 32 millimeters Synergy stent was placed in mid LAD.  There was significant distal LAD disease which is diffuse.  Then I placed a 2.25 x 38 mm Synergy stent in distal LAD in overlapping fashion.  Flow improved significantly.  Proximal stent edge has haziness.  Another 2.5 x 12 mm stent was placed in proximal to mid LAD in overlapping fashion.  At the end there was CHAPO-3 flow into the apical LAD.  Then 14 Congolese sheath was removed, 12 Congolese inline sheath was advanced to left common femoral artery.  Impella catheter was secured.  Then flow in the left leg was evaluated using ultrasound which showed no flow in popliteal artery, trace flow in superficial femoral artery.  Because of significant iliac disease and Impella in place flow was severely compromised into the leg.  Patient certainly needs Impella at least for a day or 2 to provide ventricular support.  Then we decided to do external femorofemoral bypass.  Right groin was prepped, 6 Congolese sheath was placed right common femoral artery.  Then left groin area was prepped again, a retrograde 6 Congolese sheath was placed in left superficial femoral artery using ultrasound guidance, micropuncture.  Both sheaths were connected using a connector.  There was good flow from the right femoral artery into left femoral artery.  Repeat ultrasound shows monophasic flow in popliteal artery, pulsatile flow in left superficial femoral artery which is  significantly better compared to prior.  Then Impella catheter, all the sheaths were secured with sutures.  Right neck was prepped, right internal jugular vein was accessed using 8 Kiswahili sheath, a 7.5 Kiswahili [place]-Josiah catheter advanced into pulmonary artery, pulmonary pressure was high 60 x 40.  This is to help measuring pressure to guide fluid balance upstairs in ICU.  At the end of the case Impella catheter position was again checked, patient was hemodynamically stable, transferred to ICU.      Anticoagulant: Heparin  Antiplatelet: Ticagrelor  EBL <25 cc  Complications: none  Specimens: none  Contrast: 165  Fluorotime : see cath lab flowsheet      Sedation: I supervised moderate sedation over a trained independent observer.    Sedation start time: 13:58  End time: 16:57      Electronically signed by   Glenn Boss M.D., SUN  Interventional cardiologist  7/30/2020  4:46 PM

## 2020-07-30 NOTE — DISCHARGE PLANNING
Medical Social Work    MSW responded to Code Blue in Cath Lab. Pt was transferred from [facility] in [place], [place] to [facility]. MSW called pt's emergency contact Viridiana Moody ([phone]). MSW left . MSW also called SO Viridiana Lowe ([phone]).Phone number is disconnected.     MSW did [employer] search:    Manolo Shannon ([phone])-phone number disconnected.     MSW spoke to pt's oldest son Cleve Shannon ([phone]) who stated pt is not . He is pt's oldest son. MSW also spoke to pt's son Attila Vickers ([phone]). He is a paramedic in [employer]. MSW explained a basic update to both of pt's sons. Will call back once pt is out of cath lab. MSW provided son's contact information to intesivist and cardiologist.     MSW will remain available for support.

## 2020-07-30 NOTE — CONSULTS
CARDIAC CONSULTATION    Requesting Provider: Joe Spear M.D.  Reason for consultation: EKG changes    Impressions:  #.  Acute anterolateral STEMI  -Medically managed non-STEMI earlier this year  #.  Acute pulmonary edema with respiratory failure  #.  Acute metabolic acidosis  #.  Acute decompensated systolic heart failure with new reduction in LV systolic function-LVEF estimated 25-30%  -Ischemic cardiomyopathy prior documented infarctions in the inferior wall as well as anterior apical wall  #.  History of bypass surgery, unknown grafts  #.  Hypertension, requiring IV nitroglycerin  #.  Ongoing tobacco abuse      Recommendations:  Plan for urgent cardiac catheterization possible PCI    Aspirin, statin, BB    Will follow    History: Humberto Shannon is a 60 y.o. male with a past medical history of CABG and smoking who I have been consulted regarding ST elevation.  Patient first from [facility].  He presented to [facility] earlier today with left arm pain and shortness of breath.  His condition deteriorated rapidly and he developed pulmonary edema requiring intubation.  He was transferred to our center for further care.  Evaluation of his ECG in our emergency department demonstrated subtle anterolateral ST segment elevation.  An echocardiogram showed newly declined left ventricular systolic function.  In the emergency department he was hypertensive and requiring a nitroglycerin infusion to maintain normotension.  He was intubated and sedated.     Review of the medical records indicates that he suffered a non-STEMI several months previously.  He was managed medically at this time. He was seen in our office by virtual visit he was complaining of increased fatigue and he received antianginal therapy at that time.  Following that hospitalization an echocardiogram demonstrated normal systolic function and a nuclear perfusion scan demonstrated inferior infarction as well as apical lateral  "infarction.      ROS:  All other systems reviewed and negative except as per the HPI    PE:  BP (!) 177/108   Pulse (!) 134   Temp 36.9 °C (98.4 °F) (Temporal)   Resp 20   Ht 1.778 m (5' 10\")   Wt 85.2 kg (187 lb 14.4 oz)   SpO2 (!) 86%   BMI 26.96 kg/m²   GEN: Critically ill-appearing  HEENT: Symmetric face. Anicteric sclerae. Moist mucus membranes  NECK: No JVD. No lymphadenopathy  CARDIAC: Normal PMI, regular, normal S1, S2.  No murmurs   VASCULATURE: Normal carotid amplitude without bruit. Peripheral pulses normal  RESP: Mechanical breath sounds bilaterally  ABD: Soft, non-tender, non-distended  EXT: No edema, no clubbing or cyanosis  SKIN: Warm and dry  NEURO: No gross deficits  PSYCH: Intubated and sedated      Past Medical History:   Diagnosis Date   • Arthritis     ankles, hands   • Cataract    • High cholesterol    • Hypertension    • Myocardial infarct (HCC)     x 5     Past Surgical History:   Procedure Laterality Date   • CORONARY ARTERY BYPASS, 3  2008   • LUMBAR FUSION ANTERIOR  2003    L3,4,5     No Known Allergies      Current Facility-Administered Medications:   •  propofol (DIPRIVAN) injection, 0-80 mcg/kg/min, Intravenous, Continuous, Last Rate: 10.2 mL/hr at 07/30/20 1258, 20 mcg/kg/min at 07/30/20 1258 **AND** Triglycerides Starting now and then Every 3 Days, , , Every 3 Days (0300), Joe Spear M.D.  •  iohexol (OMNIPAQUE) 350 mg/mL, 1-300 mL, Intra-arterial, Once, Glenn Boss M.D.  •  MIDAZOLAM HCL 2 MG/2ML INJ SOLN, , , ,   •  FENTANYL CITRATE (PF) 0.05 MG/ML INJ SOLN (WRAPPED), , , ,     Current Outpatient Medications:   •  isosorbide mononitrate SR (IMDUR) 30 MG TABLET SR 24 HR, Take 30 mg by mouth every morning., Disp: , Rfl:   •  clopidogrel (PLAVIX) 75 MG Tab, Take 75 mg by mouth every day., Disp: , Rfl:   •  celecoxib (CELEBREX) 200 MG Cap, Take 200 mg by mouth 2 times a day., Disp: , Rfl:   •  baclofen (LIORESAL) 10 MG Tab, take 1 tablet by mouth four times a day " with food or milk, Disp: , Rfl:   •  cyclobenzaprine (FLEXERIL) 10 MG Tab, take 1 tablet by mouth three times a day if needed for SPASMS, Disp: , Rfl:   •  ibuprofen (MOTRIN) 800 MG Tab, Take 800 mg by mouth every 8 hours as needed., Disp: , Rfl:   •  atorvastatin (LIPITOR) 80 MG tablet, Take 80 mg by mouth every day., Disp: , Rfl:   •  esomeprazole (NEXIUM) 20 MG capsule, Take 20 mg by mouth every morning before breakfast., Disp: , Rfl:   •  aspirin EC 81 MG EC tablet, Take 1 Tab by mouth every day., Disp: 30 Tab, Rfl: 3  •  metoprolol SR (TOPROL XL) 25 MG TABLET SR 24 HR, Take 1 Tab by mouth every day., Disp: 30 Tab, Rfl: 11  •  nitroGLYCERIN (NITROSTAT) 0.3 MG SL tablet, Place 1 Tab under tongue as needed for Chest Pain., Disp: 100 Tab, Rfl: 11  (Not in a hospital admission)     Social History     Socioeconomic History   • Marital status: Single     Spouse name: Not on file   • Number of children: Not on file   • Years of education: Not on file   • Highest education level: Not on file   Occupational History   • Not on file   Social Needs   • Financial resource strain: Not on file   • Food insecurity     Worry: Not on file     Inability: Not on file   • Transportation needs     Medical: Not on file     Non-medical: Not on file   Tobacco Use   • Smoking status: Current Every Day Smoker     Packs/day: 1.00     Years: 46.00     Pack years: 46.00     Types: Cigarettes   • Smokeless tobacco: Never Used   Substance and Sexual Activity   • Alcohol use: Yes     Comment: 3-4 beers a day   • Drug use: Not on file   • Sexual activity: Not on file   Lifestyle   • Physical activity     Days per week: Not on file     Minutes per session: Not on file   • Stress: Not on file   Relationships   • Social connections     Talks on phone: Not on file     Gets together: Not on file     Attends [employer] service: Not on file     Active member of club or organization: Not on file     Attends meetings of clubs or organizations: Not on file      Relationship status: Not on file   • Intimate partner violence     Fear of current or ex partner: Not on file     Emotionally abused: Not on file     Physically abused: Not on file     Forced sexual activity: Not on file   Other Topics Concern   • Not on file   Social History Narrative   • Not on file       No family history on file.       Studies  Lab Results   Component Value Date/Time    CHOLSTRLTOT 147 03/03/2005 04:30 AM    LDL 71 03/03/2005 04:30 AM    HDL 49 03/03/2005 04:30 AM    TRIGLYCERIDE 132 07/30/2020 12:36 PM       Lab Results   Component Value Date/Time    SODIUM 140 06/24/2016 03:00 AM    POTASSIUM 3.1 (L) 06/24/2016 03:00 AM    CHLORIDE 116 (H) 06/24/2016 03:00 AM    CO2 18 (L) 06/24/2016 03:00 AM    GLUCOSE 82 06/24/2016 03:00 AM    BUN 14 06/24/2016 03:00 AM    CREATININE 0.69 06/24/2016 03:00 AM    CREATININE 0.7 03/05/2005 05:15 AM     Lab Results   Component Value Date/Time    ALKPHOSPHAT 32 06/23/2016 09:04 PM    ASTSGOT 23 06/23/2016 09:04 PM    ALTSGPT 23 06/23/2016 09:04 PM    TBILIRUBIN 0.5 06/23/2016 09:04 PM      No results found for: BNPBTYPENAT    For this encounter I directly reviewed ECG tracings, Echo images and medical records    Case discussed with Dr. Mayers.     I provided 60 minutes of non-contiguous, non-procedural critical care time managing ACS. If untreated the condition would have a high likelihood of progressing to permanent disability or death    Date of Service:   CPT: 55878 (30-74 minutes)

## 2020-07-31 NOTE — PROGRESS NOTES
2340- SVR down to 2125, CI up to 1.57. Impella down to P7 due to low LV pressures. Dr. Shipley paged    0000- SVR increased to 2300, CI decreased to 1.48. Milrinone increased to 0.175 mcg/kg/min. Dr. Shipley notified. Orders for SVR Q30 minutes while titrating milrinone drip.

## 2020-07-31 NOTE — PROGRESS NOTES
1524- Paged Dr. Stoddard regarding SWAN numbers, increased SVR.  Received orders for Milrinone.  Dr. Bowman consulted for titration and parameters  Sandbags applied to R Ij and R fem to reduce hematomas.  Mixed venous gas sent to lab.

## 2020-07-31 NOTE — PROGRESS NOTES
MD Leonid to bedside. Updated on pt status, gtts, labs and VSS. MD aware SBP sustaining 70-87 with MAP>60.

## 2020-07-31 NOTE — ASSESSMENT & PLAN NOTE
Secondary to STEMI with acute pulmonary edema, intubated PTA 7/30  8/3 s/p ET tube exchange to 8.0  8/4 extubated  8/5 reintubated due to significant alcohol withdrawal and vomiting.     Plan:   On precedex gtt with goal RASS 0  Daily SBT  Will be going to OR today, not plan to extubate today. Continue spontaneous mode when tolerate  Continue gentle diuresis  Hold mobility due to OR  Goal sat >92%

## 2020-07-31 NOTE — PROGRESS NOTES
1700-Bedside report received from LEANN Larson. Lines and gtts verified. Pt transported to [facility] with 3 RN's and RT. Pt connected to monitor, Impella in place, Impella Rep and MD Shipley at bedside. MD Shipley to place arterial line. Unable to doppler pulse on LLE distal to Impella placement, MD's aware, confirmed flow with angiogram in cath lab. Fem stop in place over left groin above impella insertion site, per MD to be left on until 1830. Unable to obtain BP at this time, no pulsatile flow noted on the Impella, MD's aware. Skin further assessed, note to follow. Multiple family members called and were updated. Close monitoring in progress.    1815- MD Boss to bedside, per MD start Nitro gtt to maintain a MAP between 70-80.

## 2020-07-31 NOTE — PROGRESS NOTES
"Pharmacy Kinetics 60 y.o. male on vancomycin day # 1   2020    Received Vancomycin 2,250 mg iv loading dose at 20:21 PM   Provider specified end date: TBD    Indication for Treatment: pneumonia vs r/o CLABSI    Pertinent history per medical record: Admitted on 2020 for STEMI s/p LAD stenting.    Mr Shannon is a 60 y.o. male who was transferred to [facility] from [facility] for a STEMI. He was taken urgently to the Cath Lab where he underwent PCI/stenting to the LAD. During his CATH procedure, he had a VFib arrest and a left femoral Impella ventricular assist device was placed, as well as a right to left external femoral-femoral bypass, and a [place]-Josiah catheter.  He is currently intubated for acute respiratory failure. Vancomycin and ampicillin-sulbactam were initiated empirically as the patient has a leukocytosis and could have possible pneumonia vs a catheter infection.     Other antibiotics: ampicillin-sulbactam 3g IV Q6h     Allergies: Patient has no known allergies.     List concerns for renal function: ETHEL, age, BMI 26.96 kg/m², hypermetabolic state    Pertinent cultures to date:   : Blood culture - in process   : Blood culture - in process     MRSA nares swab if pneumonia is a concern (ordered/positive/negative/n-a): ordered    Recent Labs     20  1236 20  1843   WBC 24.4* 16.9*   NEUTSPOLYS 85.70*  --      Recent Labs     20  1236   BUN 25*   CREATININE 1.26     Intake/Output Summary (Last 24 hours) at 2020  Last data filed at 2020 1800  Gross per 24 hour   Intake --   Output 500 ml   Net -500 ml      BP (!) 177/108   Pulse 98   Temp 36.4 °C (97.5 °F) (Core)   Resp (!) 24   Ht 1.778 m (5' 10\")   Wt 85.2 kg (187 lb 14.4 oz)   SpO2 100%  Temp (24hrs), Av.7 °C (98 °F), Min:36.4 °C (97.5 °F), Max:36.9 °C (98.4 °F)    A/P   1. Vancomycin dose change: Pulse dosing. No maintenance regimen initiated due to concerns for potential accumulation of " vancomcyin.   2. Next vancomycin level: Vancomycin random level ordered for 14:00 PM on 7/31  3. Goal trough: 15-20 mcg/mL   4. Comments: Pulse dosing. An 18-hour post loading dose vancomycin random level was ordered. Pharmacy will continue to follow and recommend de-escalation of antibiotics as appropriate.     Karma Christy, PharmD, BCPS

## 2020-07-31 NOTE — PROGRESS NOTES
Monitor Summary:    HR 80s-110s, increased throughout shift  Normal sinus rhythm to sinus tach with occasional PVCs  BP 90s-110s/70s-100s, MAP 80-100s    .16/.10/.36

## 2020-07-31 NOTE — ASSESSMENT & PLAN NOTE
Likely multifactorial secondary to ATN/cardiorenal from previous cardiogenic shock  Creatinine stable  Renally dose medications minimize nephrotoxic substances  Monitor this closely, strict I/Os

## 2020-07-31 NOTE — ASSESSMENT & PLAN NOTE
7/30-8/3 cardiogenic shock resolved.   8/1 Impella removed  8/4 back on norepinephrine after reintubated    Plan:  Keep norepinephrine to keep MAP >65  Would like to minimize norepi as much as possible given his severe PAD resulting in mild cyanosis in right lower ext. May be contributing the mottling appearance on the left leg

## 2020-07-31 NOTE — PROGRESS NOTES
BMP resulted, Dr. Bowman notified.  Orders for Mag and Calcium received.  [place] waveform very dampened.  Chest xray obtained.  Dr. Bowman updated.  Dr. Bowman to bedside, advanced swan 2 cm. Dr. Bowman aware of large hematoma site at catheter insertion site. Suction alarms on Impella. P level to P7.  LV placement signal.  50/-2.  [place] STAT paged. Received orders to stop Nitro, give 250 cc bolus, hold lasix, and keep MAP 65-75.

## 2020-07-31 NOTE — PROGRESS NOTES
ABG 7.26, 26, 77, 11.9, -14. CVP 13, PA pressures 30s/20s, lung sounds ronchi, no secretions when suctioned. Page to Dr. Fitzgerald. Orders for 1 am HCO3 and 20 mg lasix.

## 2020-07-31 NOTE — DISCHARGE SUMMARY
Admission date    7/30/2020  Discharge date    Pending transfer    Discharge diagnoses    Anterolateral ST elevation MI with cardiogenic shock status post successful PCI of LAD 7/30/2020, insertion of left ventricular assist device (left femoral Impella was placed), insertion of temporary external fem-fem bypass (due to low flow distal to the Impella catheter placement with significant underlying iliac and femoral disease noted with multiple collaterals).      Cardiogenic shock as above noted, remains Impella (7/31/2020) dependent thus the reason for transfer, so far adequate flow to left leg via external femoral to femoral bypass.         PA catheter in place with ongoing titration of inotropes.  (7/30/2020      Acute respiratory failure with hypoxia/pulmonary edema required intubation on full vent support (7/30/2020).      New ischemic cardiomyopathy, LVEF 25% (previous EF 55% in 2016)      Acute kidney injury secondary to cardiogenic shock      CAD s/p CABG 2005 (venous graft to the LAD, venous graft to the distal circumflex posterolateral branch).  Above coronary angiography 7/30/2020 revealed both vein grafts are occluded, with residual ischemia in left circumflex/RCA territory)    Hypertension (initially hypertensive requiring IV nitro on admit, currently hypotensive requiring Levophed infusion).    Tobacco abuse, ongoing    Low-grade fever, COVID negative      Consults    Dr. Zachariah Shipley, pulmonary        Procedures:    7/30/20    Indication for procedure:   ST elevation myocardial infarction  Cardiogenic shock     Procedures:  1.  Insertion of 14 [place] sheath left common femoral artery.  2.  Insertion of left ventricular assist device, Impella  3.  PCI of LAD  4. Insertion of temporary external fem-fem bypass  5. Insertion of [place] catheter      Coronary arteriograms:  Left main: Very short left main  Left anterior descending: occluded in proximal to mid portion. Second diagonal is diffuse disease.  Left  circumflex: luminal irregularities.   Right coronary: Diffuse mild disease,distal RCA has focal 90% disease co-dominant  Both vein grafts are occluded.  LIMA is atretic not developed    Echocardiogram 7/30/2020  Severely reduced left ventricular systolic function.  Left ventricular ejection fraction is visually estimated to be 25%.  There is dyskinesis of the anteroseptal wall apex, and otherwise global   hypokinesis.  Diastolic function is indeterminate due to tachycardia.  Normal right ventricular size and systolic function.  Vena cava is not well visualized.  Unable to estimate pulmonary artery pressure due to an inadequate   tricuspid regurgitant jet.  No significant valvular regurgitation or stenosis.          Echocardiography  7/31/2020   CONCLUSIONS  Impella device position is advanced 3.9 cm into the left ventricle.  Chest x-ray 7/31/2020  1.  Interstitial pulmonary parenchymal prominence, compatible with interstitial edema and/or infiltrates.      HPI/Hospital coarse       60-year-old male presented 7/30/2020 (transfer from [facility] in [facility]) with ST elevation anterior MI.  Past medical history significant for CABG 2005, peripheral vascular disease with ongoing tobacco abuse.  He presented with left arm pain and dyspnea with rapid deterioration in his condition requiring intubation and transferred to [facility] for further care, echocardiogram revealed significant drop in ejection fraction to 25 to 30% with pulmonary edema, he was taken urgently to the Cath Lab where he underwent successful PCI/SANDRA to LAD, patient had a brief episode of V. fib cardiac arrest requiring CPR (2 to 3 minutes) and 1 mg of epinephrine.  A left femoral Impella ventricular assist device was placed and a temporary right to left external femoral-femoral bypass was placed due to low flow distal to the Impella catheter placement but significant underlying iliac and femoral disease noted  with multiple collaterals by report.  [place] catheter in place.  Patient is currently intubated on full vent support and sedated and unresponsive.    Discharge meds    Continuous dexmedetomidine (Precedex) 400 MCG/100 mL premix infusion at 0.4 MCG/kg/hour.  Continuous fentanyl infusion 100 mcg/hr.  Continuous IV heparin infusion 25,000 unit in D5W 500 mL infusion at 375 units/hr.  Continuous levophed infusion ,8 mg /250 ml at 5 MCG/min.    Unasyn 3 g every 6 hour. (Short course of antibiotics to cover for possible pneumonia versus catheter infection), blood culture -7/30/2020.  Aspirin 81 mg daily.  Ticagrelor (Brilinta) 90 mg twice daily BID.  Atorvastatin 40 mg daily.  Captopril 6.25 mg every 6 hours.  Pepcid 20 mg every 12 hours.  Insulin regular, dose 2 - 9 units subcu every 6 hours.  Arleen colace 2 tabs BID.  IV milrinone stopped (7/31/2020 at 11 AM ).  Propofol stopped (7/31/2020 at 12:50 PM).      Labs reviewed     Hemoglobin 8.4, hematocrit 25.4, WBC 15.5, platelet count 216, sodium 135, potassium 3.7, glucose 143, BUN 25, creatinine 1.05, AST 50, ALT 12, alk phos 28, total bili 0.5, albumin 3, total protein 5, lactic acid 1.3, phosphorus 3.6, magnesium 1.9, NT proBNP 8100.

## 2020-07-31 NOTE — CARE PLAN
Ventilator Daily Summary    Vent Day # 2    Ventilator settings changed this shift: 24/440/+8/50%      Weaning trials: yes    Respiratory Procedures: none    Plan: Continue current ventilator settings and wean mechanical ventilation as tolerated per physician orders.

## 2020-07-31 NOTE — PROGRESS NOTES
Critical Care Progress Note    Date of admission  7/30/2020    Chief Complaint  60 y.o. male admitted 7/30/2020 with STEMI, status post LAD stenting, cardiogenic shock, respiratory failure    Hospital Course    60 y.o. male who presented 7/30/2020 in transfer from [facility] in [facility] with subtle ST elevation anterior MI.  He has a history of prior CABG, vascular disease and ongoing smoking by report.  He apparently presented earlier in the day with left arm pain and dyspnea with rapid deterioration in his condition requiring intubation and transfer to [facility] for further care.  He was transferred on a nitroglycerin drip to treat significant hypertension initially.  Echocardiogram showed significant drop in ejection fraction to 25-30% with pulmonary edema.  He was taken urgently to the Cath Lab where he underwent successful PCI/stents to the LAD.  He had a brief episode of V. fib arrest requiring 2-3 minutes of CPR and 1 mg of epinephrine.  A left femoral Impella ventricular assist device was placed and temporary right to left external femoral femoral bypass was placed due to low flow distal to the Impella catheter placement with significant underlying iliac and femoral disease noted with multiple collaterals by report.  [place] catheter is in place.  He is currently intubated on full ventilator support, sedated and unresponsive.  Hemodynamics reviewed in detail and direct handoff performed with cardiology at bedside.      Interval Problem Update  Reviewed last 24 hour events:  T-max 99.7  +842 cc over last 24 hours  CXR with interstitial edema  Labs reviewed    Unasyn 7/30-P  Vancomycin 7/30-P    Fentanyl@100  Propofol@30  Heparin infusion with active titration  Milrinone@0.275    Last BM PTA    Review of Systems  Review of Systems   Unable to perform ROS: Acuity of condition        Vital Signs for last 24 hours   Temp:  [36.3 °C (97.3 °F)-37.6 °C (99.7 °F)] 37.6  °C (99.7 °F)  Pulse:  [] 121  Resp:  [12-71] 16  BP: (177)/(108) 177/108  SpO2:  [86 %-100 %] 95 %    Hemodynamic parameters for last 24 hours  CVP:  [9 MM HG-22 MM HG] 11 MM HG  PCWP:  [15 MM HG-23 MM HG] 21 MM HG  CO:  [2.7-5.5] 4.4  CI:  [1.3-2.7] 2.2    Respiratory Information for the last 24 hours  Vent Mode: APVCMV  Rate (breaths/min): 24  Vt Target (mL): 520  PEEP/CPAP: 8  MAP: 9.9  Control VTE (exp VT): 527    Physical Exam   Physical Exam  Vitals signs and nursing note reviewed.   Constitutional:       Appearance: He is ill-appearing and toxic-appearing.   HENT:      Head: Normocephalic and atraumatic.   Eyes:      Conjunctiva/sclera: Conjunctivae normal.   Cardiovascular:      Rate and Rhythm: Tachycardia present.      Comments: External femorofemoral bypass in place, poor pulses in lower extremities  Pulmonary:      Breath sounds: Rales present. No wheezing.   Abdominal:      General: There is no distension.      Palpations: Abdomen is soft.      Tenderness: There is no abdominal tenderness.   Musculoskeletal:         General: Swelling present.   Skin:     Comments: Cool to the touch   Neurological:      Comments: Heavily sedate and unable to fully assess   Psychiatric:      Comments: Unable to assess         Medications  Current Facility-Administered Medications   Medication Dose Route Frequency Provider Last Rate Last Dose   • SODIUM CHLORIDE 0.9 % IV SOLN            • aspirin (ASA) chewable tab 81 mg  81 mg Enteral Tube DAILY Hamzah Jaquez M.D.   81 mg at 07/31/20 0443   • metoprolol (LOPRESSOR) tablet 25 mg  25 mg Enteral Tube 4X/DAY Hamzah Jaquez M.D.   25 mg at 07/30/20 1954   • atorvastatin (LIPITOR) tablet 40 mg  40 mg Enteral Tube Q EVENING Hamzah Jaquez M.D.   40 mg at 07/30/20 1955   • heparin 25,000 Units in D5W 500 mL infusion   Intravenous Continuous Glennfernando Boss M.D. 8 mL/hr at 07/31/20 0610 375 Units/hr at 07/31/20 0610   • ticagrelor (BRILINTA) tablet 90 mg  90 mg Oral  BID Glenn Boss M.D.   90 mg at 07/31/20 0445   • heparin infusion 25,000 units in 500 mL 0.45% NACL   Intravenous Continuous Glenn Boss M.D. 11.5 mL/hr at 07/31/20 0622 575 Units/hr at 07/31/20 0622   • nitroglycerin 50 mg in D5W 250 ml infusion  0-200 mcg/min Intravenous Continuous Glenn Boss M.D.   Stopped at 07/30/20 2055   • ampicillin/sulbactam (UNASYN) 3 g in  mL IVPB  3 g Intravenous Q6HRS Zachariah Shipley M.D.   Stopped at 07/31/20 0509   • MD Alert...Vancomycin per Pharmacy   Other PHARMACY TO DOSE Zachariah Shipley M.D.       • Respiratory Therapy Consult   Nebulization Continuous RT Zachariah Shipley M.D.       • ipratropium-albuterol (DUONEB) nebulizer solution  3 mL Nebulization Q2HRS PRN (RT) Zachariah Shipley M.D.       • famotidine (PEPCID) tablet 20 mg  20 mg Enteral Tube Q12HRS Zachariah Shipley M.D.        Or   • famotidine (PEPCID) injection 20 mg  20 mg Intravenous Q12HRS Zachariah Shipley M.D.   20 mg at 07/31/20 0439   • senna-docusate (PERICOLACE or SENOKOT S) 8.6-50 MG per tablet 2 Tab  2 Tab Enteral Tube BID Zachariah Shipley M.D.   2 Tab at 07/31/20 0441    And   • polyethylene glycol/lytes (MIRALAX) PACKET 1 Packet  1 Packet Enteral Tube QDAY PRN Zachariah Shiplye M.D.        And   • magnesium hydroxide (MILK OF MAGNESIA) suspension 30 mL  30 mL Enteral Tube QDAY PRN Zachariah Shipley M.D.        And   • bisacodyl (DULCOLAX) suppository 10 mg  10 mg Rectal QDAY PRN Zachariah Shipley M.D.       • MD Alert...ICU Electrolyte Replacement per Pharmacy   Other PHARMACY TO DOSE Zachariah Shipley M.D.       • lidocaine (XYLOCAINE) 1 % injection 1-2 mL  1-2 mL Tracheal Tube Q30 MIN PRN Zachariah Shipley M.D.       • fentaNYL (SUBLIMAZE) injection 50 mcg  50 mcg Intravenous Q15 MIN PRN Zachariah Shipley M.D.        And   • fentaNYL (SUBLIMAZE) injection 100 mcg  100 mcg Intravenous Q15 MIN PRN Zachariah Shipley M.D.   100 mcg at 07/31/20 0500    And   • fentaNYL (SUBLIMAZE) 50 mcg/mL in  50mL (Continuous Infusion)   Intravenous Continuous Zachariah Shipley M.D. 2 mL/hr at 07/31/20 0449 100 mcg/hr at 07/31/20 0449    And   • propofol (DIPRIVAN) injection  0-40 mcg/kg/min Intravenous Continuous Zachariah Shipley M.D. 15.3 mL/hr at 07/31/20 0555 30 mcg/kg/min at 07/31/20 0555   • ipratropium-albuterol (DUONEB) nebulizer solution  3 mL Nebulization Q4HRS (RT) Zachariah Shipley M.D.   Stopped at 07/30/20 1930   • insulin regular (HumuLIN R,NovoLIN R) injection  2-9 Units Subcutaneous Q6HRS Zachariah Shipley M.D.   Stopped at 07/30/20 1930    And   • glucose 4 g chewable tablet 16 g  16 g Oral Q15 MIN PRN Zachariah Shipley M.D.        And   • dextrose 50% (D50W) injection 50 mL  50 mL Intravenous Q15 MIN PRN Zachariah Shipley M.D.       • furosemide (LASIX) injection 20 mg  20 mg Intravenous BID DIURETIC Glenn Boss M.D.   Stopped at 07/30/20 2000   • SODIUM CHLORIDE 0.9 % IV SOLN            • milrinone lactate (Primacor) 40 mg in D5W 250 mL Infusion  0-0.75 mcg/kg/min Intravenous Continuous Zachariah Shipley M.D. 8.8 mL/hr at 07/31/20 0535 0.275 mcg/kg/min at 07/31/20 0535       Fluids    Intake/Output Summary (Last 24 hours) at 7/31/2020 0647  Last data filed at 7/31/2020 0600  Gross per 24 hour   Intake 2342.38 ml   Output 1500 ml   Net 842.38 ml       Laboratory  Recent Labs     07/30/20  1315 07/30/20  1846 07/31/20  0353   ISTATAPH 7.168* 7.301* 7.275*   ISTATAPCO2 43.4* 23.7* 25.6*   ISTATAPO2 63* 344* 74   ISTATATCO2 17* 12* 13*   [id number] 85* 100* 93   ISTATARTHCO3 15.8* 11.7* 11.9*   ISTATARTBE -12* -13* -14*   ISTATTEMP see below 36.4 C 37.5 C   ISTATFIO2 100 100 50   ISTATSPEC Arterial Arterial Arterial   ISTATAPHTC  --  7.310* 7.269*   [id number]  --  341* 77         Recent Labs     07/30/20  1843 07/31/20  0036 07/31/20  0512   SODIUM 138 140 145   POTASSIUM 5.3 4.7 3.9   CHLORIDE 114* 115* 114*   CO2 9* 11* 17*   BUN 23* 24* 23*   CREATININE 1.07 0.98 1.06   MAGNESIUM 1.8  --  1.9    PHOSPHORUS  --   --  3.6   CALCIUM 7.5* 8.3* 7.6*     Recent Labs     07/30/20  1843 07/31/20  0036 07/31/20  0512   ALTSGPT 22 15  --    ASTSGOT 54* 49*  --    ALKPHOSPHAT 35 27*  --    TBILIRUBIN 0.3 0.4  --    GLUCOSE 138* 125* 140*     Recent Labs     07/30/20  1236 07/30/20  1843 07/31/20  0036 07/31/20  0512   WBC 24.4* 16.9* 13.6* 15.5*   NEUTSPOLYS 85.70*  --   --  78.30*   LYMPHOCYTES 6.60*  --   --  8.80*   MONOCYTES 6.30  --   --  10.30   EOSINOPHILS 0.10  --   --  1.60   BASOPHILS 0.60  --   --  0.40   ASTSGOT  --  54* 49*  --    ALTSGPT  --  22 15  --    ALKPHOSPHAT  --  35 27*  --    TBILIRUBIN  --  0.3 0.4  --      Recent Labs     07/30/20 1843 07/30/20  2254 07/31/20  0036 07/31/20  0512   RBC 3.89*  --  2.98* 2.71*   HEMOGLOBIN 12.6*  --  9.7* 8.6*   HEMATOCRIT 37.5*  --  29.1* 25.8*   PLATELETCT 274  --  245 216   PROTHROMBTM 14.8*  --   --   --    APTT >240.0* 63.6*  --  140.9*   INR 1.12  --   --   --        Imaging  X-Ray:  I have personally reviewed the images and compared with prior images.    Assessment/Plan  * Acute ST elevation myocardial infarction (STEMI) involving left anterior descending (LAD) coronary artery ([facility])  Assessment & Plan  Emergent left heart cath with PCI/stents to LAD  Complicated by cardiogenic shock, brief VF arrest with CPR x2-3 minutes  Heparin running through Impella circuit   Ticagrelor, aspirin, statin, BB as tolerated  Cardiology following    Cardiogenic shock ([facility])  Assessment & Plan  Titrated vasopressors, Impella at P9  PA catheter in place, follow hemodynamics closely with ongoing titration of inotropes and nitroglycerin added per cardiology  External femoral-femoral bypass in Cath Lab with known significant peripheral arterial disease    Acute respiratory failure with hypoxia (HCC)  Assessment & Plan  Secondary to STEMI with acute pulmonary edema, intubated PTA 7/30  Continue full mechanical ventilatory support  Adjust ventilator based on ABG and pulmonary  mechanics  RT/O2 protocols  Volume offload when clinically tolerated and appropriate    Leukocytosis  Assessment & Plan  Suspect stress reaction  However, multiple venous and arterial catheters placed emergently  Short course of antibiotics to cover for possible pneumonia versus catheter infections, 1 dose of vancomycin and Unasyn initiated  Follow blood cultures, PCT, clinical course with rapid de-escalation planned    Acute kidney injury (HCC)  Assessment & Plan  Secondary cardiogenic shock  Continue hemodynamic support, limit nephrotoxic agents, follow  Adequate urine output at this time    ASCVD (arteriosclerotic cardiovascular disease)- (present on admission)  Assessment & Plan  With history of prior CABG       VTE:  Heparin  Ulcer: H2 Antagonist  Lines: Central Line  Ongoing indication addressed    I have performed a physical exam and reviewed and updated ROS and Plan today (7/31/2020). In review of yesterday's note (7/30/2020), there are no changes except as documented above.     Discussed patient condition and risk of morbidity and/or mortality with RN, RT, Pharmacy and cardiology  The patient remains critically ill.  Critical care time = 50 minutes in directly providing and coordinating critical care and extensive data review.  No time overlap and excludes procedures.

## 2020-07-31 NOTE — THERAPY
PT Cardiac Rehab orders received. Per chart reveiw, pt is currently intubated. Will defer PT Cardiac Rehab until pt is more mobile to fully assess function and provide Cardiac Rehab education. Thanks    Cheyanne Rabago, PT, DPT Pager: [phone]

## 2020-07-31 NOTE — DISCHARGE PLANNING
Transfer Center:     1322- phone call from Lacy at [facility]. Conference call set up with Dr. Jaquez and Intensivist at [facility].      1347- phone call placed to film room to request CD of all images be sent up to unit.

## 2020-07-31 NOTE — CONSULTS
Critical Care Consultation    Date of consult: 7/30/2020    Referring Physician  Glenn Boss M.D.    Reason for Consultation  STEMI, status post LAD stenting, cardiogenic shock, respiratory failure    History of Presenting Illness  60 y.o. male who presented 7/30/2020 in transfer from [facility] in [facility] with subtle ST elevation anterior MI.  He has a history of prior CABG, vascular disease and ongoing smoking by report.  He apparently presented earlier in the day with left arm pain and dyspnea with rapid deterioration in his condition requiring intubation and transfer to [facility] for further care.  He was transferred on a nitroglycerin drip to treat significant hypertension initially.  Echocardiogram showed significant drop in ejection fraction to 25-30% with pulmonary edema.  He was taken urgently to the Cath Lab where he underwent successful PCI/stents to the LAD.  He had a brief episode of V. fib arrest requiring 2-3 minutes of CPR and 1 mg of epinephrine.  A left femoral Impella ventricular assist device was placed and temporary right to left external femoral femoral bypass was placed due to low flow distal to the Impella catheter placement with significant underlying iliac and femoral disease noted with multiple collaterals by report.  Russell-Josiah catheter is in place.  He is currently intubated on full ventilator support, sedated and unresponsive.  Hemodynamics reviewed in detail and direct handoff performed with cardiology at bedside.    Code Status  Full Code    Review of Systems  Review of Systems   Unable to perform ROS: Intubated       Past Medical History   has a past medical history of Arthritis, Cataract, High cholesterol, Hypertension, and Myocardial infarct (HCC).    Surgical History   has a past surgical history that includes coronary artery bypass, 3 (2008) and lumbar fusion anterior (2003).    Family History  family history is not on  file.    Social History   reports that he has been smoking cigarettes. He has a 46.00 pack-year smoking history. He has never used smokeless tobacco. He reports current alcohol use.    Medications  Home Medications     Reviewed by Vincent Calderon (Pharmacy Tech) on 07/30/20 at 1305  Med List Status: Complete   Medication Last Dose Status   aspirin EC 81 MG EC tablet UNK Active   atorvastatin (LIPITOR) 80 MG tablet UNK Active   baclofen (LIORESAL) 10 MG Tab UNK Active   celecoxib (CELEBREX) 200 MG Cap UNK Active   clopidogrel (PLAVIX) 75 MG Tab UNK Active   cyclobenzaprine (FLEXERIL) 10 MG Tab UNK Active   esomeprazole (NEXIUM) 20 MG capsule UNK Active   ibuprofen (MOTRIN) 800 MG Tab UNK Active   isosorbide mononitrate SR (IMDUR) 30 MG TABLET SR 24 HR UNK Active   metoprolol SR (TOPROL XL) 25 MG TABLET SR 24 HR UNK Active   nitroGLYCERIN (NITROSTAT) 0.3 MG SL tablet UNK Active              Current Facility-Administered Medications   Medication Dose Route Frequency Provider Last Rate Last Dose   • propofol (DIPRIVAN) injection  0-80 mcg/kg/min Intravenous Continuous Joe Spear M.D. 15.3 mL/hr at 07/30/20 1336 30 mcg/kg/min at 07/30/20 1336   • aspirin (ASA) chewable tab 81 mg  81 mg Enteral Tube DAILY Hamzah Jaquez M.D.       • metoprolol (LOPRESSOR) tablet 25 mg  25 mg Enteral Tube 4X/DAY Hamzah Jaquez M.D.       • atorvastatin (LIPITOR) tablet 40 mg  40 mg Enteral Tube Q EVENING Hamzah Jaquez M.D.       • SODIUM CHLORIDE 0.9 % IV SOLN            • heparin 25,000 Units in D5W 500 mL infusion   Intravenous Continuous Glenn Boss M.D.       • [START ON 7/31/2020] ticagrelor (BRILINTA) tablet 90 mg  90 mg Oral BID Glenn Boss M.D.       • heparin infusion 25,000 units in 500 mL 0.45% NACL   Intravenous Continuous Glenn Boss M.D.       • nitroglycerin 50 mg in D5W 250 ml infusion  0-200 mcg/min Intravenous Continuous Glenn Boss M.D.           Allergies  No Known  Allergies    Vital Signs last 24 hours  Temp:  [36.9 °C (98.4 °F)] 36.9 °C (98.4 °F)  Pulse:  [] 96  Resp:  [20-71] 24  BP: (177)/(108) 177/108  SpO2:  [86 %-100 %] 100 %    Physical Exam  Physical Exam  Vitals signs and nursing note reviewed.   Constitutional:       Appearance: He is ill-appearing.   HENT:      Head: Normocephalic and atraumatic.      Mouth/Throat:      Mouth: Mucous membranes are moist.      Comments: ETT in place  Eyes:      Pupils: Pupils are equal, round, and reactive to light.   Neck:      Musculoskeletal: No neck rigidity.   Cardiovascular:      Rate and Rhythm: Regular rhythm.      Comments: Right IJ sheath with Fontanelle-Josiah catheter in place, left femoral 3.0 Impella currently at P9, sinus rhythm/sinus tachycardia , low-dose norepinephrine, diminished pulses bilateral lower extremities, DP and PT  Pulmonary:      Comments: Full ventilator support, diminished breath sounds throughout, scattered coarse crackles  Abdominal:      General: There is no distension.      Palpations: Abdomen is soft.   Musculoskeletal:         General: No swelling or deformity.   Skin:     General: Skin is dry.      Capillary Refill: Capillary refill takes 2 to 3 seconds.      Comments: Cool   Neurological:      Comments: Currently sedated, unresponsive         Fluids  No intake or output data in the 24 hours ending 20 1849    Laboratory  Recent Results (from the past 48 hour(s))   EKG (NOW)    Collection Time: 20 12:35 PM   Result Value Ref Range    Report       [facility] Emergency Dept.    Test Date:  2020  Pt Name:    DONNY SCHRADER                   Department: ER  MRN:        [id number]                      Room:        02  Gender:     Male                         Technician: [id number]  :        1960                   Requested By:JORDY SCHILLING  Order #:    [id number]                    Reading MD:    Measurements  Intervals                                 Axis  Rate:       136                          P:          70  WY:         156                          QRS:        93  QRSD:       94                           T:          -61  QT:         296  QTc:        446    Interpretive Statements  SINUS TACHYCARDIA  PROBABLE LEFT ATRIAL ABNORMALITY  RIGHT AXIS DEVIATION  CONSIDER LEFT VENTRICULAR HYPERTROPHY  NONSPECIFIC T ABNORMALITIES, INFERIOR LEADS  ANTERIOR ST ELEVATION, PROBABLY DUE TO LVH  Compared to ECG 06/24/2016 01:29:36  Right-axis deviation now present  Left ventricular hypertrophy now present  ST (T wave)  deviation now present  Sinus rhythm no longer present  Ventricular premature complex(es) no longer present  T-wave abnormality still present     Triglycerides Starting now and then Every 3 Days    Collection Time: 07/30/20 12:36 PM   Result Value Ref Range    Triglycerides 132 0 - 149 mg/dL   CBC WITH DIFFERENTIAL    Collection Time: 07/30/20 12:36 PM   Result Value Ref Range    WBC 24.4 (H) 4.8 - 10.8 K/uL    RBC 4.65 (L) 4.70 - 6.10 M/uL    Hemoglobin 15.0 14.0 - 18.0 g/dL    Hematocrit 45.9 42.0 - 52.0 %    MCV 98.7 (H) 81.4 - 97.8 fL    MCH 32.3 27.0 - 33.0 pg    MCHC 32.7 (L) 33.7 - 35.3 g/dL    RDW 53.4 (H) 35.9 - 50.0 fL    Platelet Count 309 164 - 446 K/uL    MPV 11.6 9.0 - 12.9 fL    Neutrophils-Polys 85.70 (H) 44.00 - 72.00 %    Lymphocytes 6.60 (L) 22.00 - 41.00 %    Monocytes 6.30 0.00 - 13.40 %    Eosinophils 0.10 0.00 - 6.90 %    Basophils 0.60 0.00 - 1.80 %    Immature Granulocytes 0.70 0.00 - 0.90 %    Nucleated RBC 0.00 /100 WBC    Neutrophils (Absolute) 20.90 (H) 1.82 - 7.42 K/uL    Lymphs (Absolute) 1.60 1.00 - 4.80 K/uL    Monos (Absolute) 1.54 (H) 0.00 - 0.85 K/uL    Eos (Absolute) 0.03 0.00 - 0.51 K/uL    Baso (Absolute) 0.14 (H) 0.00 - 0.12 K/uL    Immature Granulocytes (abs) 0.17 (H) 0.00 - 0.11 K/uL    NRBC (Absolute) 0.00 K/uL   BASIC METABOLIC PANEL    Collection Time: 07/30/20 12:36 PM   Result Value Ref Range    Sodium 141 135 -  145 mmol/L    Potassium 4.7 3.6 - 5.5 mmol/L    Chloride 110 96 - 112 mmol/L    Co2 13 (L) 20 - 33 mmol/L    Glucose 186 (H) 65 - 99 mg/dL    Bun 25 (H) 8 - 22 mg/dL    Creatinine 1.26 0.50 - 1.40 mg/dL    Calcium 8.5 8.5 - 10.5 mg/dL    Anion Gap 18.0 (H) 7.0 - 16.0   proBrain Natriuretic Peptide, NT    Collection Time: 07/30/20 12:36 PM   Result Value Ref Range    NT-proBNP 4752 (H) 0 - 125 pg/mL   TROPONIN    Collection Time: 07/30/20 12:36 PM   Result Value Ref Range    Troponin T 265 (H) 6 - 19 ng/L   ESTIMATED GFR    Collection Time: 07/30/20 12:36 PM   Result Value Ref Range    GFR If African American >60 >60 mL/min/1.73 m 2    GFR If Non African American 58 (A) >60 mL/min/1.73 m 2   ISTAT ARTERIAL BLOOD GAS    Collection Time: 07/30/20  1:15 PM   Result Value Ref Range    Ph 7.168 (LL) 7.400 - 7.500    Pco2 43.4 (H) 26.0 - 37.0 mmHg    Po2 63 (L) 64 - 87 mmHg    Tco2 17 (L) 20 - 33 mmol/L    S02 85 (L) 93 - 99 %    Hco3 15.8 (L) 17.0 - 25.0 mmol/L    BE -12 (L) -4 - 3 mmol/L    Body Temp see below degrees    O2 Therapy 100 %    iPF Ratio 63     Specimen Arterial     Action Range Triggered YES     Inst. Qualified Patient YES    COVID/SARS CoV-2 PCR    Collection Time: 07/30/20  1:16 PM    Specimen: Nasopharyngeal; Respirate   Result Value Ref Range    COVID Order Status Received    SARS-CoV-2, PCR (In-House)    Collection Time: 07/30/20  1:16 PM   Result Value Ref Range    SARS-CoV-2 Source NP Swab     SARS-CoV-2 by PCR NotDetected    EC-ECHOCARDIOGRAM COMPLETE W/ CONT    Collection Time: 07/30/20  1:26 PM   Result Value Ref Range    Left Ventrical Ejection Fraction 25    POC ACT (resulted by nursing)    Collection Time: 07/30/20  2:28 PM   Result Value Ref Range    Istat Activated Clotting Time 175 (H) 74 - 137 sec   POC ACT (resulted by nursing)    Collection Time: 07/30/20  2:48 PM   Result Value Ref Range    Istat Activated Clotting Time 213 (H) 74 - 137 sec   POC ACT (resulted by nursing)    Collection  Time: 20  3:11 PM   Result Value Ref Range    Istat Activated Clotting Time 235 (H) 74 - 137 sec   POC ACT (resulted by nursing)    Collection Time: 20  4:00 PM   Result Value Ref Range    Istat Activated Clotting Time 252 (H) 74 - 137 sec   EKG STAT for Post Impella placement    Collection Time: 20  5:51 PM   Result Value Ref Range    Report       [facility]    Test Date:  2020  Pt Name:    DONNY SCHRADER                   Department: 161  MRN:        [id number]                      Room:       [facility]  Gender:     Male                         Technician: BRIAN  :        1960                   Requested By:HEDY SARABIA  Order #:    [id number]                    Reading MD: Nikolai Al MD    Measurements  Intervals                                Axis  Rate:       99                           P:          79  CO:         144                          QRS:        90  QRSD:       103                          T:          205  QT:         395  QTc:        507    Interpretive Statements  SINUS RHYTHM  ANTEROSEPTAL INFARCT, ACUTE (LAD)  PROLONGED QT INTERVAL  LEFT ATRIAL ENLARGEMENT  RIGHT AXIS DEVIATION  Compared to ECG 2020 12:35:18  Myocardial infarct finding now present  Prolonged QT interval now present  Sinus tachycardia no longer present  Electronically Signed On 2020 18:30:24 PDT by Nikolai Al MD          Imaging  DX-CHEST-PORTABLE (1 VIEW)   Final Result      Support hardware as above with Impella device visualized.      Mild interstitial edema and left basilar atelectasis      EC-ECHOCARDIOGRAM COMPLETE W/ CONT   Final Result      CL-LEFT HEART CATHETERIZATION WITH POSSIBLE INTERVENTION    (Results Pending)   EC-ECHOCARDIOGRAM LTD W/O CONT    (Results Pending)       Assessment/Plan  * Acute ST elevation myocardial infarction (STEMI) involving left anterior descending (LAD) coronary artery (HCC)  Assessment & Plan  Emergent left heart cath with PCI/stents to  LAD  Complicated by cardiogenic shock, brief VF arrest with CPR x2-3 minutes  Heparin running through Impella circuit   Ticagrelor, aspirin, statin, BB as tolerated  Cardiology following    Cardiogenic shock (HCC)  Assessment & Plan  Titrated vasopressors, Impella at P9  PA catheter in place, follow hemodynamics closely with ongoing titration of inotropes and nitroglycerin added per cardiology  External femoral-femoral bypass in Cath Lab with known significant peripheral arterial disease    Acute respiratory failure with hypoxia (HCC)  Assessment & Plan  Secondary to STEMI with acute pulmonary edema, intubated PTA 7/30  Continue full ventilator support, ventilator settings adjusted, I have decreased PEEP and FiO2  Continue LTV/LPS  SAT/SBT and liberate when clinically appropriate  All ventilator bundles in place    Leukocytosis  Assessment & Plan  Suspect stress reaction  However, multiple venous and arterial catheters placed emergently  Short course of antibiotics to cover for possible pneumonia versus catheter infections, 1 dose of vancomycin and Unasyn initiated  Follow blood cultures, PCT, clinical course with rapid de-escalation planned    Acute kidney injury (HCC)  Assessment & Plan  Secondary cardiogenic shock  Continue hemodynamic support, limit nephrotoxic agents, follow    ASCVD (arteriosclerotic cardiovascular disease)- (present on admission)  Assessment & Plan  With history of prior CABG      Discussed patient condition and risk of morbidity and/or mortality with RN, RT and cardiology.    The patient remains critically ill.  Critical care time = 90 minutes in directly providing and coordinating critical care and extensive data review.  No time overlap and excludes procedures.

## 2020-07-31 NOTE — DIETARY
Nutrition Services: Update   Consult received and acknowledged for cardiac diet instruction however pt is currently on the vent and not appropriate for education.  Will f/u prior to d/c if appropriate.

## 2020-07-31 NOTE — DISCHARGE PLANNING
Transfer Center:    Call placed to [employer] [facility] @ [phone].  Spoke with Diamond.  She requested for the accepting facility to call for the Prior Auth because they need their information to complete the referral.  Ref#:  [id number]    [facility] @ [phone].  Spoke with LEANN Jacobs.  Information relayed to her that someone from her facility will need to call INS for Prior Auth IP Admission.  She will reach out to their financial services to complete process.    Call placed to MARI Hudson on unit.  She will work on COBRA, copy chart, and Radiology disc will be send to unit.    Call received from Arlene @ [facility] with an accepting MD:  Dr. Brooks Martel, Cardiovascular surgery.  Will coordinate for transfer for am.  Sending MD was notified.    Plan:  [place] [facility]

## 2020-07-31 NOTE — DISCHARGE PLANNING
Transfer Center:    Received call from Cardiology team to transfer patient to higher level care for LVAD preferably in [facility].    Call placed to [facility] @ [phone] then directed to [facility] Transfer @ [phone].  Spoke with LEANN Jacobs.  She referred me to Luther, Heart Failure coordinator @ [phone].  Information provided then informed that their Medical director, Dr. Tarik Greene would like to discuss the case with sending provider.    Dr. Jaquez, Cardiology was informed via [employer].  Confirmed received message.    Plan:  Pending decisions after Cardiology consult with LVAD Program Medical director.

## 2020-07-31 NOTE — PROGRESS NOTES
Milrinone at 0.275. Spo2 waveform flat, art line waverform flat and MAP 60. P level to P7 and Milrinone decreased to 0.225. Dr. Shipley at bedside and aware. MAP increasing to 70s after titrations.

## 2020-07-31 NOTE — DISCHARGE PLANNING
LSW received a call from [facility] with TC stating that pt is pending a transfer to higher level of care. LSW will assist if transfer occurs. COBRA packet to be completed.

## 2020-07-31 NOTE — PROGRESS NOTES
MD Mayers updated on pt SBP dropping to 70-80 with MAP greater than 60. Per MD OK for SBP to be low as long as MAP greater than 60.

## 2020-07-31 NOTE — CARE PLAN
Problem: Pain Management  Goal: Pain level will decrease to patient's comfort goal  Outcome: PROGRESSING AS EXPECTED  Intervention: Follow pain managment plan developed in collaboration with patient and Interdisciplinary Team  Note: Pt on fentanyl gtt and has PRN fentanyl pushes for pain management       Problem: Communication  Goal: The ability to communicate needs accurately and effectively will improve  Outcome: PROGRESSING SLOWER THAN EXPECTED  Intervention: Educate patient and significant other/support system about the plan of care, procedures, treatments, medications and allow for questions  Note: Pt shows no evidence of learning, is intubated and sedated.  Plan of care discussed with sons and significant other.  These family members demonstrate verbal understanding.

## 2020-07-31 NOTE — PROGRESS NOTES
"CARDIOLOGY FOLLOW UP    Impressions:  #. Cardiogenic Shock - remains impella dependent   Severe impairment in LVEF, CO, high SVR- improved with milrinone  #. Acute STEMI, anterolateral, s/p LAD PCI 7/30   3/3 occluded bypass grafts, occluded mid LAD s/p PCI 7/30, residual ischemia in Cx/RCA territory  #. Respiratory failure- requiring lessening O2  #. Metabolic alkalosis  #. Peripheral vascular disease- adequate flow to left leg via ext fem-fem bypass  #. Tobacco use  #. Low grade Fever- COVID negative    Recommendations:  Maintain swan, Impella and fem-fem bypass circuit  Continue anticoagulation  Continue DAPT with ASA/Brilinta  Continue statin.  Metoprolol stopped, captopril started  Continue milrinone at present dosage    Will follow    SUBJECTIVE/INTERIM EVENTS:  Decreasing O2 requirements overnight. Perfusion to left leg is stable    EXAM:  BP (!) 177/108   Pulse (!) 125   Temp (!) 38.1 °C (100.6 °F) (Core)   Resp (!) 22   Ht 1.778 m (5' 10\")   Wt 89.7 kg (197 lb 12 oz)   SpO2 97%   BMI 28.37 kg/m²   Gen: Critically ill appearing  HEENT: Symmetric face. Anicteric sclerae. Moist mucus membranes  NECK: RIJ swan with hematoma at site. No lymphadenopathy  CARDIAC: Quiet cardiac tones, Normal S1,S2, no murmurs  VASCULATURE: diminished carotid amplitude without bruit. Peripheral pulses absent in bilateral extremities.   RESP: Clear to auscultation bilaterally  ABD: Soft, non-tender, non-distended  EXT: No edema, no clubbing or cyanosis  SKIN: Warm and dry  NEURO: No gross deficits   PSYCH: Appropriate affect, participates in conversation    Studies  Lab Results   Component Value Date/Time    SODIUM 145 07/31/2020 05:12 AM    POTASSIUM 3.9 07/31/2020 05:12 AM    CHLORIDE 114 (H) 07/31/2020 05:12 AM    CO2 17 (L) 07/31/2020 05:12 AM    GLUCOSE 140 (H) 07/31/2020 05:12 AM    BUN 23 (H) 07/31/2020 05:12 AM    CREATININE 1.06 07/31/2020 05:12 AM    CREATININE 0.7 03/05/2005 05:15 AM       For this encounter I " directly reviewed ECG tracings, Echo images and medical records      Current Facility-Administered Medications:   •  SODIUM CHLORIDE 0.9 % IV SOLN, , , ,   •  captopril (CAPOTEN) tablet 6.25 mg, 6.25 mg, Enteral Tube, Q6HRS, Hamzah Jaquez M.D., 6.25 mg at 07/31/20 0841  •  aspirin (ASA) chewable tab 81 mg, 81 mg, Enteral Tube, DAILY, Hamzah Jaquez M.D., 81 mg at 07/31/20 0443  •  atorvastatin (LIPITOR) tablet 40 mg, 40 mg, Enteral Tube, Q EVENING, Hamzah Jaquez M.D., 40 mg at 07/30/20 1955  •  heparin 25,000 Units in D5W 500 mL infusion, , Intravenous, Continuous, Last Rate: 8 mL/hr at 07/31/20 1010, 375 Units/hr at 07/31/20 1010 **AND** In cases where total desired heparin dose is LESS than the amount of heparin being delivered in purge solution alone, , , CONTINUOUS, Glenn Boss M.D.  •  ticagrelor (BRILINTA) tablet 90 mg, 90 mg, Oral, BID, Glenn Boss M.D., 90 mg at 07/31/20 0445  •  heparin infusion 25,000 units in 500 mL 0.45% NACL, , Intravenous, Continuous, Last Rate: 11.5 mL/hr at 07/31/20 1009, 575 Units/hr at 07/31/20 1009 **AND** Protocol 440 Heparin Weight Based DO NOT GIVE ANY HEPARIN BOLUS TO STROKE PATIENT, , , CONTINUOUS **AND** Protocol 440 Heparin Weight Based Discontinue Enoxaparin (Lovenox), Dabigatran (Pradaxa), Rivaroxaban (Xarelto), Apixaban (Eliquis), Edoxaban (Savaysa, Lixiana), Fondaparinux (Arixtra) and Argatroban prior to heparin administration, , , CONTINUOUS **AND** Protocol 440 Heparin Weight Based Draw baseline aPTT, PT, and platelet count if not already done, , , CONTINUOUS **AND** Protocol 440 Heparin Weight Based Draw aPTT 6 hours after beginning infusion, , , CONTINUOUS **AND** Protocol 440 Heparin Weight Based Record Patient Data, , , CONTINUOUS **AND** Protocol 440 Heparin Weight Based INSTRUCTIONS, , , CONTINUOUS **AND** Protocol 440 Heparin Weight Based Review aPTT results 6 hours after infusion is begun as detailed, , , CONTINUOUS **AND** Protocol 440  Heparin Weight Based Draw Platelet count every three days. Contact MD if platelet is 50% lower than baseline count., , , CONTINUOUS **AND** Protocol 440 Heparin Weight Based Adjust heparin to maintain aPTT between 55-96 sec, , , CONTINUOUS **AND** Protocol 440 Heparin Weight Based Order aPTT 6 hours after any rate change or hold until aPTT is therapeutic (55-96 seconds), , , CONTINUOUS **AND** Protocol 440 Heparin Weight Based Documentation and verification, , , CONTINUOUS, Glenn Boss M.D.  •  nitroglycerin 50 mg in D5W 250 ml infusion, 0-200 mcg/min, Intravenous, Continuous, Glenn Boss M.D., Stopped at 07/30/20 2055  •  ampicillin/sulbactam (UNASYN) 3 g in  mL IVPB, 3 g, Intravenous, Q6HRS, Zachariah Shipley M.D., Stopped at 07/31/20 0509  •  Respiratory Therapy Consult, , Nebulization, Continuous RT, Zachariah Shipley M.D.  •  ipratropium-albuterol (DUONEB) nebulizer solution, 3 mL, Nebulization, Q2HRS PRN (RT), Zachariah Shipley M.D.  •  famotidine (PEPCID) tablet 20 mg, 20 mg, Enteral Tube, Q12HRS **OR** famotidine (PEPCID) injection 20 mg, 20 mg, Intravenous, Q12HRS, Zachariah Shipley M.D., 20 mg at 07/31/20 0439  •  senna-docusate (PERICOLACE or SENOKOT S) 8.6-50 MG per tablet 2 Tab, 2 Tab, Enteral Tube, BID, 2 Tab at 07/31/20 0441 **AND** polyethylene glycol/lytes (MIRALAX) PACKET 1 Packet, 1 Packet, Enteral Tube, QDAY PRN **AND** magnesium hydroxide (MILK OF MAGNESIA) suspension 30 mL, 30 mL, Enteral Tube, QDAY PRN **AND** bisacodyl (DULCOLAX) suppository 10 mg, 10 mg, Rectal, QDAY PRN, Zachariah Shipley M.D.  •  MD Alert...ICU Electrolyte Replacement per Pharmacy, , Other, PHARMACY TO DOSE, Zachariah Shipley M.D.  •  lidocaine (XYLOCAINE) 1 % injection 1-2 mL, 1-2 mL, Tracheal Tube, Q30 MIN PRN, Zachariah Shipley M.D.  •  fentaNYL (SUBLIMAZE) injection 50 mcg, 50 mcg, Intravenous, Q15 MIN PRN **AND** fentaNYL (SUBLIMAZE) injection 100 mcg, 100 mcg, Intravenous, Q15 MIN PRN, 100 mcg at 07/31/20  0846 **AND** fentaNYL (SUBLIMAZE) 50 mcg/mL in 50mL (Continuous Infusion), , Intravenous, Continuous, Last Rate: 2 mL/hr at 07/31/20 0449, 100 mcg/hr at 07/31/20 0449 **AND** propofol (DIPRIVAN) injection, 0-40 mcg/kg/min, Intravenous, Continuous, Last Rate: 15.3 mL/hr at 07/31/20 0841, 30 mcg/kg/min at 07/31/20 0841 **AND** Triglyceride, , , Every 3 Days (0300), Zachariah Shipley M.D.  •  ipratropium-albuterol (DUONEB) nebulizer solution, 3 mL, Nebulization, Q4HRS (RT), Zachariah Shipley M.D., Stopped at 07/30/20 1930  •  insulin regular (HumuLIN R,NovoLIN R) injection, 2-9 Units, Subcutaneous, Q6HRS, Stopped at 07/30/20 1930 **AND** POC Blood Glucose, , , Q6H **AND** NOTIFY MD and PharmD, , , Once **AND** glucose 4 g chewable tablet 16 g, 16 g, Oral, Q15 MIN PRN **AND** dextrose 50% (D50W) injection 50 mL, 50 mL, Intravenous, Q15 MIN PRN, Zachariah Shipley M.D.  •  furosemide (LASIX) injection 20 mg, 20 mg, Intravenous, BID DIURETIC, Glenn Boss M.D., Stopped at 07/30/20 2000  •  milrinone lactate (Primacor) 40 mg in D5W 250 mL Infusion, 0-0.75 mcg/kg/min, Intravenous, Continuous, Zachariah Shipley M.D., Last Rate: 5.6 mL/hr at 07/31/20 0943, 0.175 mcg/kg/min at 07/31/20 0943    I provided 35 minutes of non-contiguous, non-procedural critical care time managing cardiogenic shock, impella, external fem-fem bypass, PA catheter, IV milrinone. If untreated the condition would have a high likelihood of progressing to permanent disability or death    Date of Service:   CPT: 12627 (30-74 minutes)

## 2020-07-31 NOTE — PROGRESS NOTES
MD Mayers and Leonid updated on pt's MAp dropping below 60 on both arterial line and Impella. Verbal orders received for Levophed.

## 2020-07-31 NOTE — ASSESSMENT & PLAN NOTE
Suspect stress reaction  However, multiple venous and arterial catheters placed emergently  Short course of antibiotics to cover for possible pneumonia versus catheter infections  Antibiotics escalated 7/31 due to concern for infection  Follow blood cultures, PCT, clinical course with rapid de-escalation planned

## 2020-07-31 NOTE — PROGRESS NOTES
"2 RN Skin Check    2 RN skin check complete.   Devices in place: Fem stop over left groin site, Right femoral central line, External \"fem-fem\" bypass sheaths, antunez catheter, RIJ Shallowater at 46 cm, Impella inserted into left fem. restraints, ET tube and alvino. .  Skin assessed under devices: yes.  Confirmed pressure ulcers found on: n/a.  New potential pressure ulcers noted on n/a. Wound consult placed N/A.  The following interventions in place Pillows, Mepilex, Heel float boots and Waffle bed overlay.    "

## 2020-07-31 NOTE — ASSESSMENT & PLAN NOTE
7/30 Emergent left heart cath with PCI/stents to LAD  7/30 Complicated by cardiogenic shock, brief VF arrest with CPR x2-3 minutes  8/1 Impella was removed    Plan:   Continue ticagrelor, aspirin, statin  Holding BB due to hypotension

## 2020-08-01 NOTE — PROGRESS NOTES
· 2 Skin check performed with Amirah RN  · Devices present: impella to left groin, fem-fem bypass to bilateral groins, CVC to right groin, art line, Rincon Josiah, PIV, ETT with BRIAN de oliveira foley, bilateral leg immobilizers, spo2 probe, cardiac monitor  · Skin assessed under devices  · New DTIs to left calf- medial and lateral. Purple and non blanching  · Left foot mottled and purple and cold    Waffle overlay, Q2 turns, ETT repositioned per RN and RT, heel and elbow mepilex, pillows floating extremities, immobilizers removed, mepilex to medial DTI, gauze offloading femoral bypass insertion sites

## 2020-08-01 NOTE — PROGRESS NOTES
Patient tolerated wean off impella and pump was removed by Dr. Jaquez at bedside at 1105. Fem-stop was placed and site assessed per protocol. After discontinuation of fem-stop, fem-fem bypass was reactivated. Bilateral lower extremities remain pulseless and dusky. Plan to discontinue fem-fem bypass.

## 2020-08-01 NOTE — DISCHARGE PLANNING
Call placed to [facility] transfer [place].   Spoke with Chery who is requiring that our transfer center obtain the prior auth for patients inpatient stay there. Explained to Chery that per the insurance company they would like for them to call to obtain the prior auth so they can get all the pertinent information needed.   This RN will call insurance and attempt to obtain the prior auth.

## 2020-08-01 NOTE — PROGRESS NOTES
Critical Care Progress Note    Date of admission  7/30/2020    Chief Complaint  60 y.o. male admitted 7/30/2020 with STEMI, status post LAD stenting, cardiogenic shock, respiratory failure    Hospital Course    60 y.o. male who presented 7/30/2020 in transfer from [facility] in [facility] with subtle ST elevation anterior MI.  He has a history of prior CABG, vascular disease and ongoing smoking by report.  He apparently presented earlier in the day with left arm pain and dyspnea with rapid deterioration in his condition requiring intubation and transfer to [facility] for further care.  He was transferred on a nitroglycerin drip to treat significant hypertension initially.  Echocardiogram showed significant drop in ejection fraction to 25-30% with pulmonary edema.  He was taken urgently to the Cath Lab where he underwent successful PCI/stents to the LAD.  He had a brief episode of V. fib arrest requiring 2-3 minutes of CPR and 1 mg of epinephrine.  A left femoral Impella ventricular assist device was placed and temporary right to left external femoral femoral bypass was placed due to low flow distal to the Impella catheter placement with significant underlying iliac and femoral disease noted with multiple collaterals by report.  Avon-Josiah catheter is in place.  He is currently intubated on full ventilator support, sedated and unresponsive.  Hemodynamics reviewed in detail and direct handoff performed with cardiology at bedside.      Interval Problem Update  Reviewed last 24 hour events:  T-max 101.3  +1.5L over last 24 hours, +2.3 L since admit  CXR with ongoing interstitial edema  Labs reviewed  Impella in place    Zosyn 7/31-P  Zyvox 7/31-P  Unasyn 7/30-31  Vancomycin 7/30-31    Precedex@0.8  Fentanyl@150  Heparin infusion with active titration  Levophed@6  Milrinone@pause    Last BM PTA    Review of Systems  Review of Systems   Unable to perform ROS: Acuity of condition         Vital Signs for last 24 hours   Temp:  [37.8 °C (100 °F)-38.5 °C (101.3 °F)] 38.1 °C (100.6 °F)  Pulse:  [] 78  Resp:  [11-43] 24  BP: (84)/(63) 84/63  SpO2:  [92 %-100 %] 100 %    Hemodynamic parameters for last 24 hours  CVP:  [9 MM HG-16 MM HG] 11 MM HG  PCWP:  [13 MM HG-23 MM HG] 17 MM HG  CO:  [3.7-5.9] 4.7  CI:  [1.8-2.9] 2.3    Respiratory Information for the last 24 hours  Vent Mode: APVCMV  Rate (breaths/min): 24  Vt Target (mL): 440  PEEP/CPAP: 8  MAP: 12  Control VTE (exp VT): 438    Physical Exam   Physical Exam  Vitals signs and nursing note reviewed.   Constitutional:       Appearance: He is ill-appearing and toxic-appearing.   HENT:      Head: Normocephalic and atraumatic.   Eyes:      Conjunctiva/sclera: Conjunctivae normal.   Cardiovascular:      Rate and Rhythm: Tachycardia present.      Comments: External femorofemoral bypass in place, poor pulses in lower extremities  Pulmonary:      Breath sounds: Rales present. No wheezing.   Abdominal:      General: There is no distension.      Palpations: Abdomen is soft.      Tenderness: There is no abdominal tenderness.   Musculoskeletal:         General: Swelling present.   Skin:     Comments: Cool to the touch   Neurological:      Comments: Heavily sedate and unable to fully assess   Psychiatric:      Comments: Unable to assess         Medications  Current Facility-Administered Medications   Medication Dose Route Frequency Provider Last Rate Last Dose   • captopril (CAPOTEN) tablet 6.25 mg  6.25 mg Enteral Tube Q6HRS Hamzah Jaquez M.D.   Stopped at 07/31/20 1200   • dexmedetomidine (PRECEDEX) 400 mcg/100mL NS premix infusion  0.1-1.5 mcg/kg/hr Intravenous Continuous Neo Mayers M.D. 17.9 mL/hr at 08/01/20 0041 0.8 mcg/kg/hr at 08/01/20 0041   • norepinephrine (Levophed) infusion 8 mg/250 mL (premix)  0-30 mcg/min Intravenous Continuous Neo Mayers M.D. 11.3 mL/hr at 08/01/20 0645 6 mcg/min at 08/01/20 0645   • heparin 12,500 Units  in D5W 500 mL infusion   Intravenous Continuous Gualberto Briones M.D. 7 mL/hr at 08/01/20 0602 175 Units/hr at 08/01/20 0602   • NS infusion   Intravenous Continuous Son Stoddard Jr., D.ODes   Stopped at 07/31/20 2015   • acetaminophen (TYLENOL) tablet 650 mg  650 mg Oral Q6HRS PRN Son Stoddard Jr. D.ODes   650 mg at 08/01/20 0411   • piperacillin-tazobactam (ZOSYN) 3.375 g in  mL IVPB  3.375 g Intravenous Q8HRS Son Stoddard Jr., D.O.   Stopped at 08/01/20 0452   • Linezolid (ZYVOX) premix 600 mg  600 mg Intravenous Q12HRS Son Stoddard Jr., D.O.   Stopped at 08/01/20 0615   • aspirin (ASA) chewable tab 81 mg  81 mg Enteral Tube DAILY Hamzah Jaquez M.D.   81 mg at 08/01/20 0514   • atorvastatin (LIPITOR) tablet 40 mg  40 mg Enteral Tube Q EVENING Hamzah Jaquez M.D.   40 mg at 07/31/20 1706   • ticagrelor (BRILINTA) tablet 90 mg  90 mg Oral BID Glenn Boss M.D.   90 mg at 08/01/20 0600   • heparin infusion 25,000 units in 500 mL 0.45% NACL   Intravenous Continuous Glenn Boss M.D. 8.5 mL/hr at 08/01/20 0601 425 Units/hr at 08/01/20 0601   • nitroglycerin 50 mg in D5W 250 ml infusion  0-200 mcg/min Intravenous Continuous Glenn Boss M.D.   Stopped at 07/30/20 2055   • Respiratory Therapy Consult   Nebulization Continuous RT Zachariah Shipley M.D.       • ipratropium-albuterol (DUONEB) nebulizer solution  3 mL Nebulization Q2HRS PRN (RT) Zachariah Shipley M.D.       • famotidine (PEPCID) tablet 20 mg  20 mg Enteral Tube Q12HRS Zachariah Shipley M.D.   20 mg at 08/01/20 0514    Or   • famotidine (PEPCID) injection 20 mg  20 mg Intravenous Q12HRS Zachariah Shipley M.D.   20 mg at 07/31/20 0439   • senna-docusate (PERICOLACE or SENOKOT S) 8.6-50 MG per tablet 2 Tab  2 Tab Enteral Tube BID Zachariah Shipley M.D.   2 Tab at 08/01/20 0514    And   • polyethylene glycol/lytes (MIRALAX) PACKET 1 Packet  1 Packet Enteral Tube QDAY PRN Zachariah Shipley M.D.        And   • magnesium hydroxide  (MILK OF MAGNESIA) suspension 30 mL  30 mL Enteral Tube QDAY PRN Zachariah Shipley M.D.        And   • bisacodyl (DULCOLAX) suppository 10 mg  10 mg Rectal QDAY PRN Zachariah Shipley M.D.       • MD Alert...ICU Electrolyte Replacement per Pharmacy   Other PHARMACY TO DOSE Zachariah Shipley M.D.       • lidocaine (XYLOCAINE) 1 % injection 1-2 mL  1-2 mL Tracheal Tube Q30 MIN PRN Zachariah Shipley M.D.       • fentaNYL (SUBLIMAZE) injection 50 mcg  50 mcg Intravenous Q15 MIN PRN Zachariah Shipley M.D.        And   • fentaNYL (SUBLIMAZE) injection 100 mcg  100 mcg Intravenous Q15 MIN PRN Zachariah Shipley M.D.   100 mcg at 07/31/20 2332    And   • fentaNYL (SUBLIMAZE) 50 mcg/mL in 50mL (Continuous Infusion)   Intravenous Continuous Zachariah Shipley M.D. 3 mL/hr at 07/31/20 2330 150 mcg/hr at 07/31/20 2330    And   • propofol (DIPRIVAN) injection  0-40 mcg/kg/min Intravenous Continuous Zachariah Shipley M.D.   Stopped at 07/31/20 1300   • ipratropium-albuterol (DUONEB) nebulizer solution  3 mL Nebulization Q4HRS (RT) Zachariah Shipley M.D.   3 mL at 07/31/20 2300   • insulin regular (HumuLIN R,NovoLIN R) injection  2-9 Units Subcutaneous Q6HRS Zachariah Shipley M.D.   Stopped at 08/01/20 0600    And   • glucose 4 g chewable tablet 16 g  16 g Oral Q15 MIN PRN Zachariah Shipley M.D.        And   • dextrose 50% (D50W) injection 50 mL  50 mL Intravenous Q15 MIN PRN Zachariah Shipley M.D.       • furosemide (LASIX) injection 20 mg  20 mg Intravenous BID DIURETIC Glenn Boss M.D.   20 mg at 08/01/20 0515   • milrinone lactate (Primacor) 40 mg in D5W 250 mL Infusion  0-0.75 mcg/kg/min Intravenous Continuous Zachariah Shipley M.D.   Stopped at 07/31/20 1122       Fluids    Intake/Output Summary (Last 24 hours) at 8/1/2020 0649  Last data filed at 8/1/2020 0600  Gross per 24 hour   Intake 2910.82 ml   Output 1395 ml   Net 1515.82 ml       Laboratory  Recent Labs     07/30/20  1846 07/31/20  0353 08/01/20  0503   ISTATAPH 7.301* 7.275* 7.347*    ISTATAPCO2 23.7* 25.6* 28.4   ISTATAPO2 344* 74 103*   ISTATATCO2 12* 13* 16*   [id number] 100* 93 98   ISTATARTHCO3 11.7* 11.9* 15.6*   ISTATARTBE -13* -14* -9*   ISTATTEMP 36.4 C 37.5 C 38.4 C   ISTATFIO2 100 50 50   ISTATSPEC Arterial Arterial Arterial   ISTATAPHTC 7.310* 7.269* 7.326*   [id number] 341* 77 112*         Recent Labs     07/31/20  0512 07/31/20  1120 07/31/20  1940 08/01/20  0019 08/01/20  0504   SODIUM 145 135  --  139 138   POTASSIUM 3.9 3.7 4.0 3.9 3.9   CHLORIDE 114* 107  --  111 111   CO2 17* 14*  --  15* 15*   BUN 23* 25*  --  29* 29*   CREATININE 1.06 1.05  --  1.38 1.22   MAGNESIUM 1.9  --  2.4  --  2.3   PHOSPHORUS 3.6  --   --   --  3.0   CALCIUM 7.6* 8.9  --  7.7* 7.8*     Recent Labs     07/31/20  0036  07/31/20  1120 08/01/20  0019 08/01/20  0504   ALTSGPT 15  --  12 11  --    ASTSGOT 49*  --  50* 43  --    ALKPHOSPHAT 27*  --  28* 24*  --    TBILIRUBIN 0.4  --  0.5 0.7  --    GLUCOSE 125*   < > 143* 181* 141*    < > = values in this interval not displayed.     Recent Labs     07/30/20  1236  07/31/20  0036 07/31/20  0512 07/31/20  1120 07/31/20  1909 08/01/20  0019 08/01/20  0504   WBC 24.4*   < > 13.6* 15.5*  --  16.0* 14.0* 15.7*   NEUTSPOLYS 85.70*  --   --  78.30*  --   --   --  69.80   LYMPHOCYTES 6.60*  --   --  8.80*  --   --   --  17.70*   MONOCYTES 6.30  --   --  10.30  --   --   --  10.30   EOSINOPHILS 0.10  --   --  1.60  --   --   --  1.00   BASOPHILS 0.60  --   --  0.40  --   --   --  0.60   ASTSGOT  --    < > 49*  --  50*  --  43  --    ALTSGPT  --    < > 15  --  12  --  11  --    ALKPHOSPHAT  --    < > 27*  --  28*  --  24*  --    TBILIRUBIN  --    < > 0.4  --  0.5  --  0.7  --     < > = values in this interval not displayed.     Recent Labs     07/30/20  1843  07/31/20  1722 07/31/20  1909 08/01/20  0019 08/01/20  0501 08/01/20  0504   RBC 3.89*   < >  --  2.40* 2.52*  --  2.52*   HEMOGLOBIN 12.6*   < >  --  7.7* 7.9*  --  8.1*   HEMATOCRIT 37.5*   < >  --  23.2*  24.1*  --  23.9*   PLATELETCT 274   < >  --  179 128*  --  128*   PROTHROMBTM 14.8*  --   --   --   --   --   --    APTT >240.0*   < > 186.2*  --  79.8* 52.2*  --    INR 1.12  --   --   --   --   --   --     < > = values in this interval not displayed.       Imaging  X-Ray:  I have personally reviewed the images and compared with prior images.    Assessment/Plan  * Acute ST elevation myocardial infarction (STEMI) involving left anterior descending (LAD) coronary artery (HCC)  Assessment & Plan  Emergent left heart cath with PCI/stents to LAD  Complicated by cardiogenic shock, brief VF arrest with CPR x2-3 minutes  Ticagrelor, aspirin, statin, BB as tolerated  Weaning Impella with plan for discontinuing later today    Cardiogenic shock (HCC)  Assessment & Plan  Titrated vasopressors, Impella at P9  PA catheter in place, follow hemodynamics closely with ongoing titration of inotropes and nitroglycerin added per cardiology  External femoral-femoral bypass in Cath Lab with known significant peripheral arterial disease    Acute respiratory failure with hypoxia (HCC)  Assessment & Plan  Secondary to STEMI with acute pulmonary edema, intubated PTA 7/30  Continue full mechanical ventilatory support  Adjust ventilator based on ABG and pulmonary mechanics  RT/O2 protocols  Volume offload when clinically tolerated and appropriate    Leukocytosis  Assessment & Plan  Suspect stress reaction  However, multiple venous and arterial catheters placed emergently  Short course of antibiotics to cover for possible pneumonia versus catheter infections  Antibiotics escalated last night due to concern for infection  Follow blood cultures, PCT, clinical course with rapid de-escalation planned    Acute kidney injury (HCC)  Assessment & Plan  Secondary cardiogenic shock  Renally dose medications minimize nephrotoxic substances  monitor urine output  Trend    ASCVD (arteriosclerotic cardiovascular disease)- (present on admission)  Assessment &  Plan  With history of prior CABG       VTE:  Heparin  Ulcer: H2 Antagonist  Lines: Central Line  Ongoing indication addressed    I have performed a physical exam and reviewed and updated ROS and Plan today (8/1/2020). In review of yesterday's note (7/31/2020), there are no changes except as documented above.     Discussed patient condition and risk of morbidity and/or mortality with RN, RT, Pharmacy and cardiology  The patient remains critically ill.  Critical care time = 43 minutes in directly providing and coordinating critical care and extensive data review.  No time overlap and excludes procedures.

## 2020-08-01 NOTE — PROGRESS NOTES
Dr. Jaquez at bedside. Reviews swan numbers, urine output, and sheath sites. Per MD hold 1600 lasix, keep fem-fem sheaths in over night, start heparin gtt 6 hours after Impella removal (1800).

## 2020-08-01 NOTE — DISCHARGE PLANNING
Transfer Center:    Call received from CONCHIS Payan for Dr. Jaquez, Cardiology to cancel transfer request to [facility] due to sepsis status at this time.    Call placed to [facility] @ [phone].  Spoke with Chery.  Updated on cancellation.    Plan:  TC available for any assistance.

## 2020-08-01 NOTE — PROGRESS NOTES
Notified CONCHIS Powers patient's CI 1.88 post impella removal. UO 20mls for last hour. CVP 11 and levophed remains at 5mcg/min since prior to impella removal. Will continue to monitor.

## 2020-08-01 NOTE — CARE PLAN
Problem: Fluid Volume:  Goal: Will maintain balanced intake and output  Outcome: PROGRESSING AS EXPECTED  Intervention: Monitor, educate, and encourage compliance with therapeutic intake of liquids  Note: Pt CVP 1-13, received 1 Unit RBCs, improved BP and adequate U/O.     Problem: Safety - Medical Restraint  Goal: Remains free of injury from restraints (Restraint for Interference with Medical Device)  Description: INTERVENTIONS:  1. Determine that other, less restrictive measures have been tried or would not be effective before applying the restraint  2. Evaluate the patient's condition at the time of restraint application  3. Inform patient/family regarding the reason for restraint  4. Q2H: Monitor safety, psychosocial status, comfort, nutrition and hydration  Outcome: PROGRESSING AS EXPECTED  Flowsheets (Taken 8/1/2020 0116)  Addressed this shift: Remains free of injury from restraints (restraint for interference with medical device):   Determine that other, less restrictive measures have been tried or would not be effective before applying the restraint   Evaluate the patient's condition at the time of restraint application   Inform patient/family regarding the reason for restraint   Every 2 hours: Monitor safety, psychosocial status, comfort, nutrition and hydration

## 2020-08-01 NOTE — PROGRESS NOTES
Dr. Jaquez at bedside. Orders to change P level to P5. Run swan numbers in 1 hour and call Dr. Jaquez with numbers. VSS at P5. Per Leonid, have Dr. Mayers consult vascular and push transfer to [facility] for potential impella weaning and removal. Dr. Mayers at bedside. Per Dr Mayers, cardiology needs to place consult to vascular.

## 2020-08-01 NOTE — DIETARY
"Nutrition Support Assessment:  Day 2 of admit.  Humberto Shannon is a 60 y.o. male with admitting DX of STEMI (ST elevation myocardial infarction)     Current problem list:  1. Acute respiratory failure with hypoxia  2. Acute ST elevation myocardial infarction (STEMI) involving left anterior descending (LAD) coronary artery  3. Cardiogenic shock  4. Leukocytosis  5. Acute kidney injury  6. ASCVD (anteriosclerotic cardiovascular disease)     Assessment:  Estimated Nutritional Needs based on:   Height: 177.8 cm (5' 10\")  Weight: 89.7 kg (197 lb 12 oz)  Weight to Use in Calculations: 89.7 kg (197 lb 12 oz) - admit bed scale wt  Ideal Body Weight: 75.3 kg (166 lb)  Percent Ideal Body Weight: 119.1  Body mass index is 28.37 kg/m²., BMI classification: overweight    Calculation/Equation: MSJ x 1.1 - 1.2 = 1875- 2046 kcals/day; RMR per [employer] 2003b (vent L/min: 11.3, Tmax/24 hrs: 38.3) = 2168 kcals/day  Total Calories / day: 1875 - 2063 (Calories / k - 23)  Total Grams Protein / day: 108 - 126 (Grams Protein / k.2 - 1.4)     Evaluation:   1. Transferred from outside facility for SOB.  2. Intubated in the ER.  3. Impella implanted .  4. Glucose 141, BUN 29   5. Levo 5 mcg/min, no propofol running  6. Specialized, fiber free tube feeding formula indicated to best meet pt's estimated protein needs while on pressor support.      Malnutrition Risk: Unable to determine at this time.     Recommendations/Plan:  1. Start Impact Peptide 1.5 @ 25 mL/hr and advance to protocol to goal rate of 55 mL/hr, which provides 1980 kcals, 124 grams of protein and 1016 mL of free water per day.  2. Fluids per MD.    RD following.          "

## 2020-08-01 NOTE — PROGRESS NOTES
1 unit RBCs tranfused. Repeat Hgb 7.9. Abdomen firm and distended. Patient turned with 4 RNs and no signs of bruising or hematoma to flank/back area. Dr. Fitzgerald updated. Orders for CT abdomen

## 2020-08-01 NOTE — PROCEDURES
Procedure: Impella CP removal  Performing physician: Leonid    Indication: Resolved cardiogenic shock,  0.85 on P3- hemodynamics stable, comprimised flow to the left foot.    Procedure description: Impella CP pulled using fem-stop for hemostasis. Uncomplicated removal. Hemodynamics stable throughout. Flow to foot remains.     Complications: None  Specimens: none    Plan: Continue hemostasis by femostop. Attempt to lighten pressure. If foot remains mottled, then reconnect external fem-fem circuit for 2 hours before pulling sheaths.

## 2020-08-01 NOTE — PROGRESS NOTES
Monitor Summary:    HR 70s-90s, normal SR with rare-frequent PVCs, bigeminal PVCs beginning of shift  BP 80s-100s/70s-8s, MAP 60s-80s    .16/.12/.44      [place] Values:    -1200  CI 1.8-2.2  PAP 30s/10s-20s  CVP 10-13  [place] 13-15

## 2020-08-01 NOTE — PROGRESS NOTES
"CARDIOLOGY FOLLOW UP    Impressions:  #. Cardiogenic Shock -  0.85 at P3, near euvolemic filling pressures, CO normal.   #. Acute limb ischemia- iatrogenic due to impella placed with severe inflow PAD.   #. Acute STEMI, anterolateral, s/p LAD PCI 7/30   3/3 occluded bypass grafts, occluded mid LAD s/p PCI 7/30, residual ischemia in Cx/RCA territory  #. Respiratory failure  #. Tobacco use  #. Febrile illness- COVID negative    Recommendations:  Impella removed.  Maintain femorofemoral circuit for the next few hours and then remove.  If ongoing signs of progressive limb ischemia, would consult vascular surgery new    Continue to monitor [place] and cardiac power output (MAP x CO/451)-if hemodynamic deterioration and cardiac power output less than 0.6, could reconsider implantation of cardiac support device    Continue dual antiplatelet therapy and statin    Discontinue heparin infusion    Continue supportive measures per the ICU team including vasopressors, antibiotics and ventilator support    Will follow    SUBJECTIVE/INTERIM EVENTS:  Increased mottling of the left foot over the evening.  Fever continues.    EXAM:  BP (!) 84/63   Pulse 80   Temp (!) 38.3 °C (100.9 °F) (Core)   Resp (!) 24   Ht 1.778 m (5' 10\")   Wt 89.7 kg (197 lb 12 oz)   SpO2 100%   BMI 28.37 kg/m²   Gen: Critically ill appearing  HEENT: Symmetric face. Anicteric sclerae.  Mucous membranes are dry  NECK: RIJ swan with stable hematoma at entry site.  CARDIAC: Quiet cardiac tones, Normal S1,S2, no murmurs  VASCULATURE: Mottling of the bilateral lower extremities, left greater than right.  Limited to the feet only.  External femorofemoral circuit remains intact.  RESP: Coarse mechanical breath sounds bilaterally  ABD: Soft, non-tender, non-distended  EXT: No edema, no clubbing or cyanosis  SKIN: Warm and dry  NEURO: No gross motor deficits   PSYCH: Intubated but awake and alert and responsive to questioning    Studies  Lab Results   Component " Value Date/Time    SODIUM 138 08/01/2020 05:04 AM    POTASSIUM 3.9 08/01/2020 05:04 AM    CHLORIDE 111 08/01/2020 05:04 AM    CO2 15 (L) 08/01/2020 05:04 AM    GLUCOSE 141 (H) 08/01/2020 05:04 AM    BUN 29 (H) 08/01/2020 05:04 AM    CREATININE 1.22 08/01/2020 05:04 AM    CREATININE 0.7 03/05/2005 05:15 AM       For this encounter I directly reviewed catherization films, hemodynamic tracings and medical records-the patient is suitable for weaning from the Impella device.      Current Facility-Administered Medications:   •  SODIUM CHLORIDE 0.9 % IV SOLN, , , ,   •  Pharmacy Consult: Enteral tube insertion - review meds/change route/product selection, 1 Each, Other, PHARMACY TO DOSE, Neo Mayers M.D.  •  ticagrelor (BRILINTA) tablet 90 mg, 90 mg, Enteral Tube, BID, Neo Mayers M.D.  •  acetaminophen (TYLENOL) tablet 650 mg, 650 mg, Enteral Tube, Q6HRS PRN, Neo Mayers M.D.  •  dexmedetomidine (PRECEDEX) 400 mcg/100mL NS premix infusion, 0.1-1.5 mcg/kg/hr, Intravenous, Continuous, Neo Mayers M.D., Last Rate: 22.4 mL/hr at 08/01/20 1136, 1 mcg/kg/hr at 08/01/20 1136  •  norepinephrine (Levophed) infusion 8 mg/250 mL (premix), 0-30 mcg/min, Intravenous, Continuous, Neo Mayers M.D., Last Rate: 9.4 mL/hr at 08/01/20 1147, 5 mcg/min at 08/01/20 1147  •  heparin 12,500 Units in D5W 500 mL infusion, , Intravenous, Continuous, Gualberto Briones M.D., Stopped at 08/01/20 1045  •  [COMPLETED] piperacillin-tazobactam (ZOSYN) 3.375 g in  mL IVPB, 3.375 g, Intravenous, Once, Stopped at 07/31/20 2145 **AND** piperacillin-tazobactam (ZOSYN) 3.375 g in  mL IVPB, 3.375 g, Intravenous, Q8HRS, Son Stoddard Jr., D.O., Last Rate: 25 mL/hr at 08/01/20 1157, 3.375 g at 08/01/20 1157  •  Linezolid (ZYVOX) premix 600 mg, 600 mg, Intravenous, Q12HRS, Son Stoddard Jr., D.O., Stopped at 08/01/20 0615  •  aspirin (ASA) chewable tab 81 mg, 81 mg, Enteral Tube, DAILY, Hamzah Jaquez M.D., 81 mg at  08/01/20 0514  •  atorvastatin (LIPITOR) tablet 40 mg, 40 mg, Enteral Tube, Q EVENING, Hamzah Jaquez M.D., 40 mg at 07/31/20 1706  •  heparin infusion 25,000 units in 500 mL 0.45% NACL, , Intravenous, Continuous, Stopped at 08/01/20 1045 **AND** Protocol 440 Heparin Weight Based DO NOT GIVE ANY HEPARIN BOLUS TO STROKE PATIENT, , , CONTINUOUS **AND** Protocol 440 Heparin Weight Based Discontinue Enoxaparin (Lovenox), Dabigatran (Pradaxa), Rivaroxaban (Xarelto), Apixaban (Eliquis), Edoxaban (Sav[place], [place]), Fondaparinux (Arixtra) and Argatroban prior to heparin administration, , , CONTINUOUS **AND** Protocol 440 Heparin Weight Based Draw baseline aPTT, PT, and platelet count if not already done, , , CONTINUOUS **AND** Protocol 440 Heparin Weight Based Draw aPTT 6 hours after beginning infusion, , , CONTINUOUS **AND** Protocol 440 Heparin Weight Based Record Patient Data, , , CONTINUOUS **AND** Protocol 440 Heparin Weight Based INSTRUCTIONS, , , CONTINUOUS **AND** Protocol 440 Heparin Weight Based Review aPTT results 6 hours after infusion is begun as detailed, , , CONTINUOUS **AND** Protocol 440 Heparin Weight Based Draw Platelet count every three days. Contact MD if platelet is 50% lower than baseline count., , , CONTINUOUS **AND** Protocol 440 Heparin Weight Based Adjust heparin to maintain aPTT between 55-96 sec, , , CONTINUOUS **AND** Protocol 440 Heparin Weight Based Order aPTT 6 hours after any rate change or hold until aPTT is therapeutic (55-96 seconds), , , CONTINUOUS **AND** Protocol 440 Heparin Weight Based Documentation and verification, , , CONTINUOUS, Glenn Boss M.D.  •  nitroglycerin 50 mg in D5W 250 ml infusion, 0-200 mcg/min, Intravenous, Continuous, Glenn Boss M.D., Stopped at 07/30/20 2055  •  Respiratory Therapy Consult, , Nebulization, Continuous RT, Zachariah Shipley M.D.  •  ipratropium-albuterol (DUONEB) nebulizer solution, 3 mL, Nebulization, Q2HRS PRN (RT), Zachariah DUNNE  KITA Shipley  •  famotidine (PEPCID) tablet 20 mg, 20 mg, Enteral Tube, Q12HRS, 20 mg at 08/01/20 0514 **OR** famotidine (PEPCID) injection 20 mg, 20 mg, Intravenous, Q12HRS, Zachariah Shipley M.D., 20 mg at 07/31/20 0439  •  senna-docusate (PERICOLACE or SENOKOT S) 8.6-50 MG per tablet 2 Tab, 2 Tab, Enteral Tube, BID, 2 Tab at 08/01/20 0514 **AND** polyethylene glycol/lytes (MIRALAX) PACKET 1 Packet, 1 Packet, Enteral Tube, QDAY PRN **AND** magnesium hydroxide (MILK OF MAGNESIA) suspension 30 mL, 30 mL, Enteral Tube, QDAY PRN **AND** bisacodyl (DULCOLAX) suppository 10 mg, 10 mg, Rectal, QDAY PRN, Zachariah Shipley M.D.  •  MD Alert...ICU Electrolyte Replacement per Pharmacy, , Other, PHARMACY TO DOSE, Zachariah Shipley M.D.  •  lidocaine (XYLOCAINE) 1 % injection 1-2 mL, 1-2 mL, Tracheal Tube, Q30 MIN PRN, Zachariah Shipley M.D.  •  fentaNYL (SUBLIMAZE) injection 50 mcg, 50 mcg, Intravenous, Q15 MIN PRN **AND** fentaNYL (SUBLIMAZE) injection 100 mcg, 100 mcg, Intravenous, Q15 MIN PRN, 100 mcg at 08/01/20 1137 **AND** fentaNYL (SUBLIMAZE) 50 mcg/mL in 50mL (Continuous Infusion), , Intravenous, Continuous, Last Rate: 3 mL/hr at 08/01/20 0700, 2,500 mcg at 08/01/20 1127 **AND** [DISCONTINUED] propofol (DIPRIVAN) injection, 0-40 mcg/kg/min, Intravenous, Continuous, Stopped at 07/31/20 1300 **AND** [CANCELED] Triglyceride, , , Every 3 Days (0300), Zachariah Shipley M.D.  •  ipratropium-albuterol (DUONEB) nebulizer solution, 3 mL, Nebulization, Q4HRS (RT), Zachariah Shipley M.D., 3 mL at 07/31/20 2300  •  insulin regular (HumuLIN R,NovoLIN R) injection, 2-9 Units, Subcutaneous, Q6HRS, Stopped at 08/01/20 0600 **AND** POC Blood Glucose, , , Q6H **AND** NOTIFY MD and PharmD, , , Once **AND** [DISCONTINUED] glucose 4 g chewable tablet 16 g, 16 g, Oral, Q15 MIN PRN **AND** dextrose 50% (D50W) injection 50 mL, 50 mL, Intravenous, Q15 MIN PRN, Zachariah Shipley M.D.  •  furosemide (LASIX) injection 20 mg, 20 mg, Intravenous, BID DIURETIC,  Glenn Boss M.D., 20 mg at 08/01/20 0515  •  milrinone lactate (Primacor) 40 mg in D5W 250 mL Infusion, 0-0.75 mcg/kg/min, Intravenous, Continuous, Zachariah Shipley M.D., Stopped at 07/31/20 1122    Discussed plan of care with Dr. Mayers, critical care medicine    I provided 70 minutes of non-contiguous, non-procedural critical care time managing Impella weaning, acute limb ischemia and PA catheter. If untreated the condition would have a high likelihood of progressing to permanent disability or death    Date of Service: 08/01/20  CPT: 43330 (30-74 minutes)

## 2020-08-01 NOTE — PROGRESS NOTES
Shift summary:  Upon assessment of patient, patient indicating leg pain in the right leg. Per kodi, he takes Celebrex for nerve pain.  MAP dropping to 50s, levo increased to 10. New CI 2.2, . New H/H 7.7/23.2. New 5-6 beat runs of vtach, STAT K and Mag sent. Temp now 38.7. Left leg, impella site, with no pulses, patient unable to move leg, and unable to feel touch which are new assessments findings. leg is increasingly more mottled and purple.   Dr. Arlyn gutierrez. Received orders for 1 unit RBCs, tylenol Q6 hours for T>38 and cooling blanket >38. No Celebrex due to potential bleeding risk.   Page to DR. Jacob regarding ectopy and left leg vasculature. Orders to replace electrolytes per PharmD, call if ectopy increasing, and continue other above interventions.

## 2020-08-01 NOTE — PROGRESS NOTES
Pt back from STAT abdominal/pelvis CT.  Impella and [place] catheter intact and in previous placement.

## 2020-08-02 NOTE — PROGRESS NOTES
Patient HR increasing 120s-130s. Suddenly up to 140s-170s. Sustaining. BP 70s-80s/40s. PAP 60s/40s. Patient diaphoretic. Dr. Hines stat paged and at bedside. EKG called. Dr. Mcgraw updated. Dr. Mcgraw to come to bedside to review EKG and assess patient. Orders for amio bolus and gtt

## 2020-08-02 NOTE — CARE PLAN
Problem: Safety  Goal: Will remain free from injury  Outcome: PROGRESSING AS EXPECTED     Problem: Communication  Goal: The ability to communicate needs accurately and effectively will improve  Outcome: PROGRESSING SLOWER THAN EXPECTED

## 2020-08-02 NOTE — CARE PLAN
Problem: Bowel/Gastric:  Goal: Normal bowel function is maintained or improved  Outcome: PROGRESSING AS EXPECTED  Note: [place] placed, tube feeding initiated     Problem: Respiratory:  Goal: Respiratory status will improve  Outcome: PROGRESSING AS EXPECTED  Note: Remains intubated, PAP monitored, ABG daily, VAP protocol in place, daily CXR

## 2020-08-02 NOTE — PALLIATIVE CARE
"Palliative Care follow-up  Met with pts sons, Cleve and Attila at the bedside, moved to the conference. Spoke about their fathers current condition. Spoke about the level of damage he has sustained to his heart over the years and with the current insult. Spoke about pts fever and frailty. Attila stated that he had spoken with his father who had stated that he would not wish to live disabled or \"hooked to machines\". Spoke about quality of life and pts independence.Spoke about what he would want if he was able to let us know. Discussed code status and ramifications of CPR.   Family asked if they could also speak with Dr James for further updates on extent of pts heart damage and if he would still be a candidate for a transfer if his fevers were to resolve. Let them know I will see if Dr James is available.     Returned with Dr James who provided thorough update on pts heart, vascular complications, and frailty. Spoke about pts previous lifestyle choices and the implications this has had on pts current condition. Spoke about quality of life and what the pt would want. Discussed Code status further and ramifications of CPR.     Family in agreement to discuss with their other brother, Manolo who had to return home and will update staff on Code status decisions.     Updated: Dr James aware of families decisions for further discussion and that they will update staff when a decision has been reached.     Plan: Continue to work with pts children for the pts POC/GOC as the pts admission progresses.     Thank you for allowing Palliative Care to support this patient and family. Contact [phone] for additional assistance, change in patient status, or with any questions/concerns.   "

## 2020-08-02 NOTE — PROGRESS NOTES
Bedside assessment.  Review of telemetry strips.  Became tachy and hypotensive.  Twelve-lead EKG and rhythm strips consistent with sinus tachycardia with left bundle branch block.  Back on levophed.  Heart rate slowly came down.  Blood pressure improved.

## 2020-08-02 NOTE — PROGRESS NOTES
"CARDIOLOGY FOLLOW UP    Impressions:  #. Cardiogenic Shock -  0.73 after impella removal  #. Acute limb ischemia   - resolved following impella removal, stabilized with ext fem-fem circuit. Now removed   - bilateral feet remain cold but stable to slightly improved. I suspect this is related to severe PAD and ongoing NE use   #. Acute STEMI, anterolateral, s/p LAD PCI 7/30   3/3 occluded bypass grafts, occluded mid LAD s/p PCI 7/30, residual ischemia in Cx/RCA territory  #. Respiratory failure  #. Tobacco use  #. Febrile illness- COVID negative, receiving broad spectrum Abx    Recommendations:  External Fem-Fem circuit removed, monitor perfusion to the feet. Vascular study ordered. Could consider vascular surgery evaluation pending  - Resume heparin 6 hours after sheath removal    Continue aspirin, brilinta, atorvastatin    BB/ACEi on hold due to ongoing vasopressor use, tenuous hemodynamics    Med Rx of Cx/RCA territory- could consider revascularization at a later date    Agree with cautious continuation of lasix to maintain euvolemia- filling pressures currently optimized.    Will follow    SUBJECTIVE/INTERIM EVENTS:  Hemodynamics remain stable off the impella but still requiring. Feet remain cool but stable over the past ~18 hours, Improved since removing the impella    EXAM:  BP (!) 84/63   Pulse 82   Temp 37.5 °C (99.5 °F)   Resp (!) 24   Ht 1.778 m (5' 10\")   Wt 90 kg (198 lb 6.6 oz)   SpO2 99%   BMI 28.47 kg/m²   Gen: Critically ill appearing  HEENT: atraumatic  NECK: RIJ catheter, hematoma stable, dressing CDI  CARDIAC: cardiac tones remain quiet  VASCULATURE: coolness of the bilateral feet, pulses not palpable. No external mottling  RESP: mechanical sounds bilaterally but clear  ABD: soft, NT/ND  EXT: Cool, no edema or cyanosis  SKIN: Warm aside from the feet  NEURO: awakens to stimuli, moves all 4 extremities    Studies  Lab Results   Component Value Date/Time    SODIUM 137 08/02/2020 03:58 AM    " POTASSIUM 4.2 08/02/2020 03:58 AM    CHLORIDE 110 08/02/2020 03:58 AM    CO2 14 (L) 08/02/2020 03:58 AM    GLUCOSE 152 (H) 08/02/2020 03:58 AM    BUN 26 (H) 08/02/2020 03:58 AM    CREATININE 0.99 08/02/2020 03:58 AM    CREATININE 0.7 03/05/2005 05:15 AM       For this encounter I directly reviewed catherization films, hemodynamic tracings and medical records-the patient is suitable for weaning from the Impella device.      Current Facility-Administered Medications:   •  LORazepam (ATIVAN) injection 4 mg, 4 mg, Intravenous, Q15 MIN PRN **OR** LORazepam (ATIVAN) injection 3 mg, 3 mg, Intravenous, Q15 MIN PRN **OR** LORazepam (ATIVAN) injection 2 mg, 2 mg, Intravenous, Q15 MIN PRN, 2 mg at 08/02/20 0611 **OR** LORazepam (ATIVAN) injection 1 mg, 1 mg, Intravenous, Q15 MIN PRN, Asher Hines M.D.  •  polyethylene glycol/lytes (MIRALAX) PACKET 1 Packet, 1 Packet, Enteral Tube, BID, Neo Mayers M.D., 1 Packet at 08/02/20 0740  •  bisacodyl (DULCOLAX) suppository 10 mg, 10 mg, Rectal, DAILY, Neo Mayers M.D., 10 mg at 08/02/20 0740  •  propofol (DIPRIVAN) injection, 0-80 mcg/kg/min, Intravenous, Continuous, Last Rate: 10.8 mL/hr at 08/02/20 0740, 20 mcg/kg/min at 08/02/20 0740 **AND** Triglycerides Starting now and then Every 3 Days, , , Every 3 Days (0300), Neo Mayers M.D.  •  thiamine tablet 100 mg, 100 mg, Enteral Tube, BID, Neo Mayers M.D., 100 mg at 08/02/20 0957  •  Pharmacy Consult: Enteral tube insertion - review meds/change route/product selection, 1 Each, Other, PHARMACY TO DOSE, Neo Mayers M.D.  •  ticagrelor (BRILINTA) tablet 90 mg, 90 mg, Enteral Tube, BID, Neo Mayers M.D., 90 mg at 08/02/20 0600  •  acetaminophen (TYLENOL) tablet 650 mg, 650 mg, Enteral Tube, Q6HRS PRN, Neo Mayers M.D., 650 mg at 08/01/20 1615  •  DOBUTamine (DOBUTREX) 1 mg/mL premix infusion, 0-20 mcg/kg/min, Intravenous, Continuous, Joe Mcgraw M.D., Stopped at 08/02/20 7662  •  dexmedetomidine  (PRECEDEX) 400 mcg/100mL NS premix infusion, 0.1-1.5 mcg/kg/hr, Intravenous, Continuous, Neo Mayers M.D., Stopped at 08/02/20 0720  •  norepinephrine (Levophed) infusion 8 mg/250 mL (premix), 0-30 mcg/min, Intravenous, Continuous, Neo Mayers M.D., Last Rate: 15 mL/hr at 08/02/20 0700, 8 mcg/min at 08/02/20 0700  •  [COMPLETED] piperacillin-tazobactam (ZOSYN) 3.375 g in  mL IVPB, 3.375 g, Intravenous, Once, Stopped at 07/31/20 2145 **AND** piperacillin-tazobactam (ZOSYN) 3.375 g in  mL IVPB, 3.375 g, Intravenous, Q8HRS, Son Stoddard Jr., D.O., Stopped at 08/02/20 0904  •  aspirin (ASA) chewable tab 81 mg, 81 mg, Enteral Tube, DAILY, Hamzah Jaquez M.D., 81 mg at 08/02/20 0504  •  atorvastatin (LIPITOR) tablet 40 mg, 40 mg, Enteral Tube, Q EVENING, Hamzah Jaquez M.D., 40 mg at 08/01/20 1711  •  Respiratory Therapy Consult, , Nebulization, Continuous RT, Zachariah Shipley M.D.  •  ipratropium-albuterol (DUONEB) nebulizer solution, 3 mL, Nebulization, Q2HRS PRN (RT), Zachariah Shipley M.D.  •  famotidine (PEPCID) tablet 20 mg, 20 mg, Enteral Tube, Q12HRS, 20 mg at 08/02/20 0504 **OR** famotidine (PEPCID) injection 20 mg, 20 mg, Intravenous, Q12HRS, Zachariah Shipley M.D., 20 mg at 07/31/20 0439  •  senna-docusate (PERICOLACE or SENOKOT S) 8.6-50 MG per tablet 2 Tab, 2 Tab, Enteral Tube, BID, 2 Tab at 08/02/20 0504 **AND** polyethylene glycol/lytes (MIRALAX) PACKET 1 Packet, 1 Packet, Enteral Tube, QDAY PRN **AND** magnesium hydroxide (MILK OF MAGNESIA) suspension 30 mL, 30 mL, Enteral Tube, QDAY PRN, 30 mL at 08/01/20 1711 **AND** bisacodyl (DULCOLAX) suppository 10 mg, 10 mg, Rectal, QDAY PRN, Zachariah Shipley M.D.  •  MD Alert...ICU Electrolyte Replacement per Pharmacy, , Other, PHARMACY TO DOSE, Zachariah Shipley M.D.  •  lidocaine (XYLOCAINE) 1 % injection 1-2 mL, 1-2 mL, Tracheal Tube, Q30 MIN PRN, Zachariah Shipley M.D., 2 mL at 08/02/20 2194  •  fentaNYL (SUBLIMAZE) injection 50 mcg, 50 mcg,  Intravenous, Q15 MIN PRN **AND** fentaNYL (SUBLIMAZE) injection 100 mcg, 100 mcg, Intravenous, Q15 MIN PRN, 100 mcg at 08/02/20 0957 **AND** fentaNYL (SUBLIMAZE) 50 mcg/mL in 50mL (Continuous Infusion), , Intravenous, Continuous, Last Rate: 1 mL/hr at 08/02/20 0700, 50 mcg/hr at 08/02/20 0700 **AND** [DISCONTINUED] propofol (DIPRIVAN) injection, 0-40 mcg/kg/min, Intravenous, Continuous, Stopped at 07/31/20 1300 **AND** [CANCELED] Triglyceride, , , Every 3 Days (0300), Zachariah Shipley M.D.  •  insulin regular (HumuLIN R,NovoLIN R) injection, 2-9 Units, Subcutaneous, Q6HRS, Stopped at 08/01/20 0600 **AND** POC Blood Glucose, , , Q6H **AND** NOTIFY MD and PharmD, , , Once **AND** [DISCONTINUED] glucose 4 g chewable tablet 16 g, 16 g, Oral, Q15 MIN PRN **AND** dextrose 50% (D50W) injection 50 mL, 50 mL, Intravenous, Q15 MIN PRN, Zachariah Shipley M.D.  •  furosemide (LASIX) injection 20 mg, 20 mg, Intravenous, BID DIURETIC, Glenn Boss M.D., 20 mg at 08/02/20 0642    Discussed plan of care with Dr. Mayers, critical care medicine    I provided 35 minutes of non-contiguous, non-procedural critical care time managing acute limb ischemia and PA catheter, comorbid cardiogenic shock with septic shock. If untreated the condition would have a high likelihood of progressing to permanent disability or death    Date of Service: 08/02/20  CPT: 00827 (30-74 minutes)

## 2020-08-02 NOTE — RESPIRATORY CARE
Adult Ventilation Update    Total Vent Days: 4   CMV 24/440/+8/40%    Patient Lines/Drains/Airways Status    Active Airway     Name: Placement date: Placement time: Site: Days:    Airway ETT Oral 7.5  07/30/20   --   Oral  4            Plateau Pressure: 16 (08/01/20 2040)  Static Compliance (ml / cm H2O): 41.2 (08/02/20 1448)    Patient failed trials because of Barriers to Wean: Evidence of ongoing myocardial ischemia such as Chest Pain, Vent Dysrhythmia, ST segment changes on EKG (08/01/20 1452)  Barriers to SBT    Length of Weaning Trial      Sputum/Suction   Cough: Productive (08/02/20 1448)  Sputum Amount: Small (08/02/20 1448)  Sputum Color: White;Tan (08/02/20 1448)  Sputum Consistency: Thick (08/02/20 1448)      Events/Summary/Plan: Pt placed on Spont  (08/02/20 1448)

## 2020-08-02 NOTE — PROGRESS NOTES
Critical Care Progress Note    Date of admission  7/30/2020    Chief Complaint  60 y.o. male admitted 7/30/2020 with STEMI, status post LAD stenting, cardiogenic shock, respiratory failure    Hospital Course    60 y.o. male who presented 7/30/2020 in transfer from [facility] in [facility] with subtle ST elevation anterior MI.  He has a history of prior CABG, vascular disease and ongoing smoking by report.  He apparently presented earlier in the day with left arm pain and dyspnea with rapid deterioration in his condition requiring intubation and transfer to [facility] for further care.  He was transferred on a nitroglycerin drip to treat significant hypertension initially.  Echocardiogram showed significant drop in ejection fraction to 25-30% with pulmonary edema.  He was taken urgently to the Cath Lab where he underwent successful PCI/stents to the LAD.  He had a brief episode of V. fib arrest requiring 2-3 minutes of CPR and 1 mg of epinephrine.  A left femoral Impella ventricular assist device was placed and temporary right to left external femoral femoral bypass was placed due to low flow distal to the Impella catheter placement with significant underlying iliac and femoral disease noted with multiple collaterals by report.  [place] catheter is in place.  He is currently intubated on full ventilator support, sedated and unresponsive.  Hemodynamics reviewed in detail and direct handoff performed with cardiology at bedside.      Interval Problem Update  Reviewed last 24 hour events:  T-max 100.9  + 700 cc over last 24 hours, +3 L since admit  CXR with interstitial edema  Labs reviewed  Impella removed 8/1  ABG 7.3 5/25/70/94 on 30    Zosyn 7/31-P  Zyvox 7/31-P  Unasyn 7/30-31  Vancomycin 7/30-31    Precedex@1  Fentanyl@50  Heparin infusion with active titration  Levophed@10  Dobutamine@pause    Last BM PTA    Addendum:  Had discussion with pt sons and palliative. They  are discussing continue full care but no cpr. Arterial study showed no flow in left common femoral artery - vascular was consulted by cardiology    Review of Systems  Review of Systems   Unable to perform ROS: Acuity of condition        Vital Signs for last 24 hours   Temp:  [37.4 °C (99.3 °F)-38.3 °C (100.9 °F)] 37.7 °C (99.9 °F)  Pulse:  [] 117  Resp:  [9-32] 17  SpO2:  [91 %-100 %] 91 %    Hemodynamic parameters for last 24 hours  CVP:  [6 MM HG-17 MM HG] 6 MM HG  PCWP:  [10 MM HG-33 MM HG] 16 MM HG  CO:  [3.6-4.7] 4.7  CI:  [1.8-2.3] 2.3    Respiratory Information for the last 24 hours  Vent Mode: APVCMV  Rate (breaths/min): 24  Vt Target (mL): 440  PEEP/CPAP: 8  MAP: 13  Control VTE (exp VT): 430    Physical Exam   Physical Exam  Vitals signs and nursing note reviewed.   Constitutional:       Appearance: He is ill-appearing and toxic-appearing.   HENT:      Head: Normocephalic and atraumatic.   Eyes:      Conjunctiva/sclera: Conjunctivae normal.   Cardiovascular:      Rate and Rhythm: Tachycardia present.      Comments: External femorofemoral bypass in place, poor pulses in lower extremities  Pulmonary:      Breath sounds: Rales present. No wheezing.   Abdominal:      General: There is no distension.      Palpations: Abdomen is soft.      Tenderness: There is no abdominal tenderness.   Musculoskeletal:         General: Swelling present.   Skin:     Comments: Cool to the touch   Neurological:      Comments: Heavily sedate and unable to fully assess   Psychiatric:      Comments: Unable to assess         Medications  Current Facility-Administered Medications   Medication Dose Route Frequency Provider Last Rate Last Dose   • amiodarone (NEXTERONE) 360 mg/200 mL infusion  0.5-1 mg/min Intravenous Continuous Asher Hines M.D.   Stopped at 08/02/20 0515   • LORazepam (ATIVAN) injection 4 mg  4 mg Intravenous Q15 MIN PRN Asher Hines M.D.        Or   • LORazepam (ATIVAN) injection 3 mg  3 mg Intravenous Q15  MIN PRN Asher Hines M.D.        Or   • LORazepam (ATIVAN) injection 2 mg  2 mg Intravenous Q15 MIN PRN Asher Hines M.D.   2 mg at 08/02/20 0611    Or   • LORazepam (ATIVAN) injection 1 mg  1 mg Intravenous Q15 MIN PRN Asher Hines M.D.       • Pharmacy Consult: Enteral tube insertion - review meds/change route/product selection  1 Each Other PHARMACY TO DOSE Neo Mayers M.D.       • ticagrelor (BRILINTA) tablet 90 mg  90 mg Enteral Tube BID Neo Mayers M.D.   90 mg at 08/02/20 0600   • acetaminophen (TYLENOL) tablet 650 mg  650 mg Enteral Tube Q6HRS PRN Neo Mayers M.D.   650 mg at 08/01/20 1615   • heparin injection 3,200 Units  3,200 Units Intravenous PRN Hamzah Jaquez M.D.        And   • heparin infusion 25,000 units in 500 mL 0.45% NACL   Intravenous Continuous Hamzah Jaquez M.D. 24 mL/hr at 08/02/20 0104 1,200 Units/hr at 08/02/20 0104   • DOBUTamine (DOBUTREX) 1 mg/mL premix infusion  0-20 mcg/kg/min Intravenous Continuous Joe Mcgraw M.D.   Stopped at 08/02/20 0352   • SODIUM CHLORIDE 0.9 % IV SOLN            • dexmedetomidine (PRECEDEX) 400 mcg/100mL NS premix infusion  0.1-1.5 mcg/kg/hr Intravenous Continuous Neo Mayers M.D. 22.4 mL/hr at 08/02/20 0435 1 mcg/kg/hr at 08/02/20 0435   • norepinephrine (Levophed) infusion 8 mg/250 mL (premix)  0-30 mcg/min Intravenous Continuous Neo Mayers M.D. 18.8 mL/hr at 08/02/20 0615 10 mcg/min at 08/02/20 0615   • piperacillin-tazobactam (ZOSYN) 3.375 g in  mL IVPB  3.375 g Intravenous Q8HRS Son Stoddard Jr., D.O. 25 mL/hr at 08/02/20 0504 3.375 g at 08/02/20 0504   • Linezolid (ZYVOX) premix 600 mg  600 mg Intravenous Q12HRS Son Stoddard Jr., D.O.   Stopped at 08/02/20 0601   • aspirin (ASA) chewable tab 81 mg  81 mg Enteral Tube DAILY Hamzah Jaquez M.D.   81 mg at 08/02/20 0504   • atorvastatin (LIPITOR) tablet 40 mg  40 mg Enteral Tube Q EVENING Hamzah Jaquez M.D.   40 mg at 08/01/20 1711   • Respiratory  Therapy Consult   Nebulization Continuous RT Zachariah Shipley M.D.       • ipratropium-albuterol (DUONEB) nebulizer solution  3 mL Nebulization Q2HRS PRN (RT) Zachariah Shipley M.D.       • famotidine (PEPCID) tablet 20 mg  20 mg Enteral Tube Q12HRS Zachariah Shipley M.D.   20 mg at 08/02/20 0504    Or   • famotidine (PEPCID) injection 20 mg  20 mg Intravenous Q12HRS Zachariah Shipley M.D.   20 mg at 07/31/20 0439   • senna-docusate (PERICOLACE or SENOKOT S) 8.6-50 MG per tablet 2 Tab  2 Tab Enteral Tube BID Zachariah Shipley M.D.   2 Tab at 08/02/20 0504    And   • polyethylene glycol/lytes (MIRALAX) PACKET 1 Packet  1 Packet Enteral Tube QDAY PRN Zachariah Shipley M.D.        And   • magnesium hydroxide (MILK OF MAGNESIA) suspension 30 mL  30 mL Enteral Tube QDAY PRN Zachariah Shipley M.D.   30 mL at 08/01/20 1711    And   • bisacodyl (DULCOLAX) suppository 10 mg  10 mg Rectal QDAY PRN Zachariah Shipley M.D.       • MD Alert...ICU Electrolyte Replacement per Pharmacy   Other PHARMACY TO DOSE Zachariah Shipley M.D.       • lidocaine (XYLOCAINE) 1 % injection 1-2 mL  1-2 mL Tracheal Tube Q30 MIN PRN Zachariah Shipley M.D.   2 mL at 08/02/20 0424   • fentaNYL (SUBLIMAZE) injection 50 mcg  50 mcg Intravenous Q15 MIN PRN Zachariah Shipley M.D.        And   • fentaNYL (SUBLIMAZE) injection 100 mcg  100 mcg Intravenous Q15 MIN PRN Zachariah Shipley M.D.   100 mcg at 08/02/20 0440    And   • fentaNYL (SUBLIMAZE) 50 mcg/mL in 50mL (Continuous Infusion)   Intravenous Continuous Zachariah Shipley M.D. 1 mL/hr at 08/02/20 0500 50 mcg/hr at 08/02/20 0500   • insulin regular (HumuLIN R,NovoLIN R) injection  2-9 Units Subcutaneous Q6HRS Zachariah Shipley M.D.   Stopped at 08/01/20 0600    And   • dextrose 50% (D50W) injection 50 mL  50 mL Intravenous Q15 MIN PRN Zachariah Shipley M.D.       • furosemide (LASIX) injection 20 mg  20 mg Intravenous BID DIURETIC Glenn Boss M.D.   Stopped at 08/01/20 1600       Fluids    Intake/Output Summary (Last 24  hours) at 8/2/2020 0633  Last data filed at 8/2/2020 0600  Gross per 24 hour   Intake 1833.97 ml   Output 1125 ml   Net 708.97 ml       Laboratory  Recent Labs     07/31/20  0353 08/01/20  0503 08/02/20  0419   ISTATAPH 7.275* 7.347* 7.358*   ISTATAPCO2 25.6* 28.4 25.3*   ISTATAPO2 74 103* 70   ISTATATCO2 13* 16* 15*   GNPWGBS4QAM 93 98 94   ISTATARTHCO3 11.9* 15.6* 14.2*   ISTATARTBE -14* -9* -10*   ISTATTEMP 37.5 C 38.4 C 37.6 C   ISTATFIO2 50 50 30   ISTATSPEC Arterial Arterial Arterial   ISTATAPHTC 7.269* 7.326* 7.349*   NCLYITGB0CG 77 112* 73         Recent Labs     07/31/20  0512 07/31/20  1940 08/01/20 0019 08/01/20  0504 08/02/20 0358   SODIUM 145   < >  --  139 138 137   POTASSIUM 3.9   < > 4.0 3.9 3.9 4.2   CHLORIDE 114*   < >  --  111 111 110   CO2 17*   < >  --  15* 15* 14*   BUN 23*   < >  --  29* 29* 26*   CREATININE 1.06   < >  --  1.38 1.22 0.99   MAGNESIUM 1.9  --  2.4  --  2.3  --    PHOSPHORUS 3.6  --   --   --  3.0  --    CALCIUM 7.6*   < >  --  7.7* 7.8* 7.9*    < > = values in this interval not displayed.     Recent Labs     07/31/20  1120 08/01/20  0019 08/01/20  0504 08/02/20  0358   ALTSGPT 12 11  --  27   ASTSGOT 50* 43  --  115*   ALKPHOSPHAT 28* 24*  --  26*   TBILIRUBIN 0.5 0.7  --  0.4   GLUCOSE 143* 181* 141* 152*     Recent Labs     07/31/20  0512 07/31/20  1120 08/01/20  0019 08/01/20  0504 08/02/20  0358   WBC 15.5*  --    < > 14.0* 15.7* 13.6*   NEUTSPOLYS 78.30*  --   --   --  69.80 73.30*   LYMPHOCYTES 8.80*  --   --   --  17.70* 16.30*   MONOCYTES 10.30  --   --   --  10.30 8.40   EOSINOPHILS 1.60  --   --   --  1.00 1.00   BASOPHILS 0.40  --   --   --  0.60 0.40   ASTSGOT  --  50*  --  43  --  115*   ALTSGPT  --  12  --  11  --  27   ALKPHOSPHAT  --  28*  --  24*  --  26*   TBILIRUBIN  --  0.5  --  0.7  --  0.4    < > = values in this interval not displayed.     Recent Labs     07/30/20  1843  08/01/20  0019 08/01/20  0501 08/01/20  0504 08/01/20  2335 08/02/20  0358   RBC  3.89*   < > 2.52*  --  2.52*  --  2.31*   HEMOGLOBIN 12.6*   < > 7.9*  --  8.1*  --  7.3*   HEMATOCRIT 37.5*   < > 24.1*  --  23.9*  --  22.2*   PLATELETCT 274   < > 128*  --  128*  --  78*   PROTHROMBTM 14.8*  --   --   --   --   --   --    APTT >240.0*   < > 79.8* 52.2*  --  199.2*  --    INR 1.12  --   --   --   --   --   --     < > = values in this interval not displayed.       Imaging  X-Ray:  I have personally reviewed the images and compared with prior images.    Assessment/Plan  * Acute ST elevation myocardial infarction (STEMI) involving left anterior descending (LAD) coronary artery ([facility])  Assessment & Plan  Emergent left heart cath with PCI/stents to LAD  Complicated by cardiogenic shock, brief VF arrest with CPR x2-3 minutes  Ticagrelor, aspirin, statin, BB as tolerated  Impella discontinued 8/1    Cardiogenic shock ([facility])  Assessment & Plan  Impella removed 8/1  PA catheter in place, follow hemodynamics closely with ongoing titration of inotropes   Monitor endorgan perfusion  Currently on Levophed at 10  Dobutamine currently at pause    Acute respiratory failure with hypoxia ([facility])  Assessment & Plan  Secondary to STEMI with acute pulmonary edema, intubated PTA 7/30  Continue full mechanical ventilatory support  Adjust ventilator based on ABG and pulmonary mechanics  RT/O2 protocols  Volume offload when clinically tolerated and appropriate    Leukocytosis  Assessment & Plan  Suspect stress reaction  However, multiple venous and arterial catheters placed emergently  Short course of antibiotics to cover for possible pneumonia versus catheter infections  Antibiotics escalated 7/31 due to concern for infection  Follow blood cultures, PCT, clinical course with rapid de-escalation planned    Acute kidney injury (H[facility])  Assessment & Plan  Secondary cardiogenic shock  Renally dose medications minimize nephrotoxic substances  monitor urine output  Trend  Improving    ASCVD (arteriosclerotic cardiovascular disease)-  (present on admission)  Assessment & Plan  With history of prior CABG       VTE:  Heparin  Ulcer: H2 Antagonist  Lines: Central Line  Ongoing indication addressed    I have performed a physical exam and reviewed and updated ROS and Plan today (8/2/2020). In review of yesterday's note (8/1/2020), there are no changes except as documented above.     Discussed patient condition and risk of morbidity and/or mortality with RN, RT, Pharmacy and cardiology  The patient remains critically ill.  Critical care time = 50 minutes in directly providing and coordinating critical care and extensive data review.  No time overlap and excludes procedures.

## 2020-08-02 NOTE — CONSULTS
Reason for PC Consult: Advance Care Planning    Consulted by: Dr Mayers    Assessment:  General: 61 yo male admitted on 7/30/2020 for STEMI  PMH: Arthritis, Cataract, High cholesterol, HTN, MI, Coronary bypass-2008.    Pt presented to the ED at [facility] in [facility] for SOB with left shoulder pain. Chest xray there showed pulmonary edema from possible CHF exacerbation, BiPap was attempted but pt was noncompliant, pt placed on nitroglycerin gtt and was intubated, pt transferred via [facility] to [facility]. Pt taken emergently to Cath Lab.     Dyspnea: Yes- intubated  Last BM: None charted at this time   Pain: Unable to determine-    Depression: Unable to determine-    Dementia: Unable to Determine;       Spiritual:  Is Jehovah's witness or spirituality important for coping with this illness? Unable to determine-    Has a  or spiritual provider visit been requested? Unable to determine    Palliative Performance Scale: 30%    Advance Directive: None on file.   DPOA: No, NOK Attila, [phone], Cleve Shannon [phone], Manolo [phone], pts sons.   POLST: No-      Code Status: Full-      Outcome:  Arrived at the bedside, pt current intubated. Spoke with bedside RN who stated that pts son, Attila, will be at the bedside at 15:00 today.   Called and spoke with Attila, introduced self and the role of Palliative Care. Spoke about his fathers current condition. Attila stated that he and his brothers are all very involved in their fathers life. Asked if his brothers were here in town. Attila stated that Cleve was still here but that his brother Manolo had returned home to his young children. Let Attila know that I would like to be able to sit down with him and his brother to discuss his Dad's POC/GOC and that I would speak with the Unit Charge RN to determine if that would be feasible today. Attila agreed that this would be a good idea as they are concerned.   Let Attila know I would check in and call him back to  verify.    Spoke with Charge RN Bridger to confirm that a family meeting with pts sons at 15:00 would work, permision provided.     Called Attila back to update that he and Cleve should come to the bedside at 15:00 today and that we can discuss his fathers POC/GOC. Attila stated that he was attempting to find out if his father had an AD or any type of paperwork at his home defining his wishes. Agreed to meet at the bedside today at 15:00. Provided Attila with Palliative Team contact number.     Updated: Dr Mayers, Bedside RN    Plan: Meet with pts sons at 15:00 to discuss POC/GOC and code status.     Thank you for allowing Palliative Care to participate in this patient's care. Please feel free to call [phone] with any questions or concerns.

## 2020-08-02 NOTE — PROGRESS NOTES
Post 250 mL bolus patient still with minimal urine output. PAP 40s-60s/20s-30s. PAWP 21-33. CI 1.77 and SVR 1377. Dr. Mcgraw paged. Orders for dobutamine gtt for inotropic support and RN to wean off levo for MAP >60

## 2020-08-02 NOTE — PROGRESS NOTES
Updated Dr. Mayers with morning events including current PAP 70s/30s, ativan for CIWA orders, and hemodynamics. Per Dr. Mayers switch from dex to prop and adjust vasoactive gtts as needed, give lasix that was held

## 2020-08-02 NOTE — PROGRESS NOTES
12 hour chart check performed at bedside    Monitor Summary  SR, ST w/ BBB  HR   0.16/0.16/0.38    0440 episode of ST HR up to 180s for 15 minutes.    SVR [id number]  CI 1.7-2.3  PAWP 10-33  PAP 30s-70s/10s-30s

## 2020-08-03 PROBLEM — I99.8 ISCHEMIA OF LEFT LOWER EXTREMITY: Status: ACTIVE | Noted: 2020-01-01

## 2020-08-03 PROBLEM — I70.229 CRITICAL LOWER LIMB ISCHEMIA (HCC): Status: ACTIVE | Noted: 2020-01-01

## 2020-08-03 PROBLEM — I25.110 CORONARY ARTERY DISEASE INVOLVING NATIVE CORONARY ARTERY WITH UNSTABLE ANGINA PECTORIS (HCC): Status: ACTIVE | Noted: 2020-01-01

## 2020-08-03 NOTE — PALLIATIVE CARE
Palliative Care follow-up  Spoke with bedside RN on pt and previous conversation had with pts two sons, Cleve and Attila yesterday with Dr James.   Unsure if Attila will be visitor for pt today or the pts girlfriend. Per vascular MD notes last night advising conservative management for left LE at this time.    Plan: Will continue to remain available to work with pts family for POC/GOC as pts admission progresses.     Thank you for allowing Palliative Care to support this patient and family. Contact [phone] for additional assistance, change in patient status, or with any questions/concerns.

## 2020-08-03 NOTE — CARE PLAN
Ventilator Daily Summary    Vent Day # 5    7.5 @ 25  CMV 24, 440, +8, 30%    Ventilator settings changed this shift: Titrated FIO2    Weaning trials: YES, pt did not follow for NIF or VC.      Respiratory Procedures: NONE     Plan: Continue current ventilator settings and wean mechanical ventilation as tolerated per physician orders.

## 2020-08-03 NOTE — CARE PLAN
Problem: Nutritional:  Goal: Nutrition support tolerated and meeting greater than 85% of estimated needs  Outcome: NOT MET, see dietary progress note

## 2020-08-03 NOTE — PROGRESS NOTES
Critical Care Progress Note    Date of admission  7/30/2020    Chief Complaint  60 y.o. male admitted 7/30/2020 with STEMI, status post LAD stenting, cardiogenic shock, respiratory failure    Hospital Course    60 y.o. male who presented 7/30/2020 in transfer from [facility] in [facility] with subtle ST elevation anterior MI.  He has a history of prior CABG, vascular disease and ongoing smoking by report.  He apparently presented earlier in the day with left arm pain and dyspnea with rapid deterioration in his condition requiring intubation and transfer to [facility] for further care.  He was transferred on a nitroglycerin drip to treat significant hypertension initially.  Echocardiogram showed significant drop in ejection fraction to 25-30% with pulmonary edema.  He was taken urgently to the Cath Lab where he underwent successful PCI/stents to the LAD.  He had a brief episode of V. fib arrest requiring 2-3 minutes of CPR and 1 mg of epinephrine.  A left femoral Impella ventricular assist device was placed and temporary right to left external femoral femoral bypass was placed due to low flow distal to the Impella catheter placement with significant underlying iliac and femoral disease noted with multiple collaterals by report.  [place] catheter is in place.  He is currently intubated on full ventilator support, sedated and unresponsive.  Hemodynamics reviewed in detail and direct handoff performed with cardiology at bedside.      Interval Problem Update  Reviewed last 24 hour events:  Remains critically ill  Sedated with propofol gtt and fentanyl gtt  HR in 100-133  MAP >65  CI 2.4-2.7  Off inotropes.    On full vent support 30%/8  Febrile with Tm 38C  -500 cc over last 24 hours, +2.2 L since admit  CXR with interstitial edema  Labs reviewed  Impella removed 8/1  ABG 7.3 5/25/70/94 on 30      Precedex@1  Fentanyl@50  Heparin infusion with active  titration  Levophed@10  Dobutamine@pause    Addendum:  Had discussion with pt sons and palliative. They are discussing continue full care but no cpr. Arterial study showed no flow in left common femoral artery - vascular was consulted by cardiology    Review of Systems  Review of Systems   Unable to perform ROS: Acuity of condition        Vital Signs for last 24 hours   Temp:  [37.2 °C (99 °F)-38 °C (100.4 °F)] 38 °C (100.4 °F)  Pulse:  [] 133  Resp:  [19-38] 38  BP: ()/(59-73) 108/73  SpO2:  [94 %-100 %] 100 %    Hemodynamic parameters for last 24 hours  CVP:  [20 MM HG-27 MM HG] 27 MM HG  PCWP:  [12 MM HG] 12 MM HG  CO:  [4.1-5.5] 5.5  CI:  [2-2.7] 2.7    Respiratory Information for the last 24 hours  Vent Mode: APVCMV  Rate (breaths/min): 24  Vt Target (mL): 440  PEEP/CPAP: 8  P Support: 5  MAP: 10  Length of Weaning Trial (Hours): 10 min  Control VTE (exp VT): 426    Physical Exam   Physical Exam  Vitals signs and nursing note reviewed.   Constitutional:       Appearance: He is ill-appearing and toxic-appearing.      Comments: Intubated, sedated, RASS +1 to -1   HENT:      Head: Normocephalic and atraumatic.      Mouth/Throat:      Comments: ET tube in place  Eyes:      Conjunctiva/sclera: Conjunctivae normal.   Cardiovascular:      Rate and Rhythm: Normal rate and regular rhythm.      Pulses: Normal pulses.      Heart sounds: Normal heart sounds. No murmur.      Comments: External femorofemoral bypass in place, poor pulses in lower extremities  Pulmonary:      Breath sounds: Rales present. No wheezing.      Comments: diminsihed breath sounds  Abdominal:      General: Bowel sounds are normal. There is no distension.      Palpations: Abdomen is soft.      Tenderness: There is no abdominal tenderness. There is no guarding.   Musculoskeletal:         General: Swelling present.   Skin:     Capillary Refill: Capillary refill takes 2 to 3 seconds. 2-3 in right leg and >3 in left leg     Comments: Cool to  the touch on the left side  Faint mottling in knee and lower ext, cool to touch extending up to left thigh  No dopplerable pulses noted in DP/TP, and popliteal, +/- femoral.    Neurological:      Comments: Heavily sedate and unable to fully assess   Psychiatric:      Comments: Unable to assess         Medications  Current Facility-Administered Medications   Medication Dose Route Frequency Provider Last Rate Last Dose   • LORazepam (ATIVAN) injection 4 mg  4 mg Intravenous Q15 MIN PRN Asher Hines M.D.        Or   • LORazepam (ATIVAN) injection 3 mg  3 mg Intravenous Q15 MIN PRN Asher Hines M.D.        Or   • LORazepam (ATIVAN) injection 2 mg  2 mg Intravenous Q15 MIN PRN Asher Hines M.D.   2 mg at 08/03/20 0647    Or   • LORazepam (ATIVAN) injection 1 mg  1 mg Intravenous Q15 MIN PRN Asher Hines M.D.       • polyethylene glycol/lytes (MIRALAX) PACKET 1 Packet  1 Packet Enteral Tube BID Neo Mayers M.D.   Stopped at 08/02/20 1800   • bisacodyl (DULCOLAX) suppository 10 mg  10 mg Rectal DAILY Neo Mayers M.D.   Stopped at 08/03/20 0600   • propofol (DIPRIVAN) injection  0-80 mcg/kg/min Intravenous Continuous Neo Mayers M.D. 8.1 mL/hr at 08/03/20 0645 15 mcg/kg/min at 08/03/20 0645   • thiamine tablet 100 mg  100 mg Enteral Tube BID Neo Mayers M.D.   100 mg at 08/03/20 0534   • heparin injection 3,200 Units  3,200 Units Intravenous PRN Hamzah Jaquez M.D.   3,200 Units at 08/03/20 0033    And   • heparin infusion 25,000 units in 500 mL 0.45% NACL   Intravenous Continuous Hamzah Jaquez M.D. 29 mL/hr at 08/03/20 0034 1,450 Units/hr at 08/03/20 0034   • ondansetron (ZOFRAN) syringe/vial injection 4 mg  4 mg Intravenous Q4HRS PRN Son Stoddard Jr., D.O.   4 mg at 08/03/20 0647   • Pharmacy Consult: Enteral tube insertion - review meds/change route/product selection  1 Each Other PHARMACY TO DOSE Neo Mayers M.D.       • ticagrelor (BRILINTA) tablet 90 mg  90 mg Enteral Tube BID  Neo Mayers M.D.   90 mg at 08/03/20 0534   • acetaminophen (TYLENOL) tablet 650 mg  650 mg Enteral Tube Q6HRS PRN Neo Mayers M.D.   650 mg at 08/01/20 1615   • DOBUTamine (DOBUTREX) 1 mg/mL premix infusion  0-20 mcg/kg/min Intravenous Continuous Joe Mcgraw M.D.   Stopped at 08/02/20 0352   • dexmedetomidine (PRECEDEX) 400 mcg/100mL NS premix infusion  0.1-1.5 mcg/kg/hr Intravenous Continuous Neo Mayers M.D.   Stopped at 08/02/20 0720   • norepinephrine (Levophed) infusion 8 mg/250 mL (premix)  0-30 mcg/min Intravenous Continuous Neo Mayers M.D.   Stopped at 08/02/20 1500   • piperacillin-tazobactam (ZOSYN) 3.375 g in  mL IVPB  3.375 g Intravenous Q8HRS Son Stoddard Jr., D.O. 25 mL/hr at 08/03/20 0534 3.375 g at 08/03/20 0534   • aspirin (ASA) chewable tab 81 mg  81 mg Enteral Tube DAILY Hamazh Jaquez M.D.   81 mg at 08/03/20 0534   • atorvastatin (LIPITOR) tablet 40 mg  40 mg Enteral Tube Q EVENING Hamzah Jaquez M.D.   40 mg at 08/02/20 1727   • Respiratory Therapy Consult   Nebulization Continuous RT Zachariah Shipley M.D.       • ipratropium-albuterol (DUONEB) nebulizer solution  3 mL Nebulization Q2HRS PRN (RT) Zachariah Shipley M.D.       • famotidine (PEPCID) tablet 20 mg  20 mg Enteral Tube Q12HRS Zachariah Shipley M.D.   20 mg at 08/02/20 1727    Or   • famotidine (PEPCID) injection 20 mg  20 mg Intravenous Q12HRS Zachariah Shipley M.D.   20 mg at 08/03/20 0534   • senna-docusate (PERICOLACE or SENOKOT S) 8.6-50 MG per tablet 2 Tab  2 Tab Enteral Tube BID Zachariah Shipley M.D.   Stopped at 08/02/20 1800    And   • polyethylene glycol/lytes (MIRALAX) PACKET 1 Packet  1 Packet Enteral Tube QDAY PRN Zachariah Shipley M.D.        And   • magnesium hydroxide (MILK OF MAGNESIA) suspension 30 mL  30 mL Enteral Tube QDAY PRN Zachariah Shipley M.D.   30 mL at 08/01/20 1711    And   • bisacodyl (DULCOLAX) suppository 10 mg  10 mg Rectal QDAY PRN Zachariah Shipley M.D.       • MD Alert...ICU  Electrolyte Replacement per Pharmacy   Other PHARMACY TO DOSE Zachariah Shipley M.D.       • lidocaine (XYLOCAINE) 1 % injection 1-2 mL  1-2 mL Tracheal Tube Q30 MIN PRN Zachariah Shipley M.D.   2 mL at 08/02/20 0424   • fentaNYL (SUBLIMAZE) injection 50 mcg  50 mcg Intravenous Q15 MIN PRN Zachariah Shipley M.D.        And   • fentaNYL (SUBLIMAZE) injection 100 mcg  100 mcg Intravenous Q15 MIN PRN Zachariah Shipley M.D.   100 mcg at 08/02/20 1500    And   • fentaNYL (SUBLIMAZE) 50 mcg/mL in 50mL (Continuous Infusion)   Intravenous Continuous Zachariah Shipley M.D. 1 mL/hr at 08/02/20 0700 50 mcg/hr at 08/02/20 0700   • insulin regular (HumuLIN R,NovoLIN R) injection  2-9 Units Subcutaneous Q6HRS Zachariah Shipley M.D.   Stopped at 08/01/20 0600    And   • dextrose 50% (D50W) injection 50 mL  50 mL Intravenous Q15 MIN PRN Zachariah Shipley M.D.       • furosemide (LASIX) injection 20 mg  20 mg Intravenous BID DIURETIC Glenn Boss M.D.   20 mg at 08/03/20 0534       Fluids    Intake/Output Summary (Last 24 hours) at 8/3/2020 0719  Last data filed at 8/3/2020 0621  Gross per 24 hour   Intake 1996.49 ml   Output 2795 ml   Net -798.51 ml       Laboratory  Recent Labs     08/01/20  0503 08/02/20  0419 08/03/20  0417   ISTATAPH 7.347* 7.358* 7.452   ISTATAPCO2 28.4 25.3* 26.9   ISTATAPO2 103* 70 99*   ISTATATCO2 16* 15* 20   [id number] 98 94 98   ISTATARTHCO3 15.6* 14.2* 18.8   ISTATARTBE -9* -10* -5*   ISTATTEMP 38.4 C 37.6 C 37.9 C   ISTATFIO2 50 30 40   ISTATSPEC Arterial Arterial Arterial   ISTATAPHTC 7.326* 7.349* 7.439   [id number] 112* 73 104*         Recent Labs     07/31/20  1940  08/01/20  0504 08/02/20  0358 08/03/20  0408   SODIUM  --    < > 138 137 140   POTASSIUM 4.0   < > 3.9 4.2 3.5*   CHLORIDE  --    < > 111 110 107   CO2  --    < > 15* 14* 19*   BUN  --    < > 29* 26* 23*   CREATININE  --    < > 1.22 0.99 0.96   MAGNESIUM 2.4  --  2.3  --   --    PHOSPHORUS  --   --  3.0  --   --    CALCIUM  --    < > 7.8*  7.9* 8.1*    < > = values in this interval not displayed.     Recent Labs     08/01/20  0019 08/01/20  0504 08/02/20  0358 08/03/20  0408   ALTSGPT 11  --  27 31   ASTSGOT 43  --  115* 132*   ALKPHOSPHAT 24*  --  26* 28*   TBILIRUBIN 0.7  --  0.4 0.5   GLUCOSE 181* 141* 152* 130*     Recent Labs     08/01/20  0019 08/01/20  0504 08/02/20  0358 08/03/20  0408   WBC 14.0* 15.7* 13.6* 10.1   NEUTSPOLYS  --  69.80 73.30* 72.60*   LYMPHOCYTES  --  17.70* 16.30* 14.00*   MONOCYTES  --  10.30 8.40 9.40   EOSINOPHILS  --  1.00 1.00 3.20   BASOPHILS  --  0.60 0.40 0.30   ASTSGOT 43  --  115* 132*   ALTSGPT 11  --  27 31   ALKPHOSPHAT 24*  --  26* 28*   TBILIRUBIN 0.7  --  0.4 0.5     Recent Labs     08/01/20  0504 08/01/20  2335 08/02/20  0358 08/02/20  0615 08/02/20  2350 08/03/20  0408   RBC 2.52*  --  2.31*  --   --  2.42*   HEMOGLOBIN 8.1*  --  7.3*  --   --  7.7*   HEMATOCRIT 23.9*  --  22.2*  --   --  23.0*   PLATELETCT 128*  --  78*  --   --  90*   APTT  --  199.2*  --  116.1* 47.1*  --        Imaging  X-Ray:  I have personally reviewed the images and compared with prior images.    Assessment/Plan  * Acute ST elevation myocardial infarction (STEMI) involving left anterior descending (LAD) coronary artery (HCC)- (present on admission)  Assessment & Plan  Emergent left heart cath with PCI/stents to LAD  Complicated by cardiogenic shock, brief VF arrest with CPR x2-3 minutes  Ticagrelor, aspirin, statin, BB as tolerated  Impella discontinued 8/1    Ischemia of left lower extremity  Assessment & Plan  Cool clammy left leg with some faint mottling extending up to the thigh  No dopplerable DP/TP and popliteal pulses +/- femoral   Continue frequent pulses check. Overall stable in the past 48 hours.   Currently no plan for surgical intervention.   D/w Vascular surgery     Cardiogenic shock (HCC)- (present on admission)  Assessment & Plan  Appears cardiogenic shock has resolved.   Impella removed 8/1  Pt is off inotropes in  the past 24 hours.   PA catheter in place, CI ranges in low 2s to mid 2s.   Plan:   Follow hemodynamics closely with ongoing titration of inotropes   Monitor endorgan perfusion    Acute respiratory failure with hypoxia (HCC)  Assessment & Plan  Secondary to STEMI with acute pulmonary edema, intubated PTA 7/30  Plan:   Daily SAT  Daily SBT  Pt has large amount of thick secretions and vomited few times in the past 24 hours.   Also has some issues with cuff leak while coughing and tube feed consistency noted in trach secretions.   ET tube was exchanged to 8.0 and bronched. (see my notes for details)   Continue diuresis  Mobility with PT/OT, goal level 2   Volume offload when clinically tolerated and appropriate    Acute kidney injury (HCC)  Assessment & Plan  Secondary cardiogenic shock  Renally dose medications minimize nephrotoxic substances  monitor urine output, strict  Improving    ASCVD (arteriosclerotic cardiovascular disease)- (present on admission)  Assessment & Plan  With history of prior CABG       VTE:  Heparin  Ulcer: H2 Antagonist  Lines: Central Line  Ongoing indication addressed    I have performed a physical exam and reviewed and updated ROS and Plan today (8/3/2020). In review of yesterday's note (8/2/2020), there are no changes except as documented above.     Discussed patient condition and risk of morbidity and/or mortality with RN, RT, Pharmacy and cardiology  The patient remains critically ill.  Critical care time = 80 minutes in directly providing and coordinating critical care and extensive data review.  No time overlap and excludes procedures.

## 2020-08-03 NOTE — CARE PLAN
Adult Ventilation Update    Total Vent Days: 5  24/440/+8/30%    Patient Lines/Drains/Airways Status    Active Airway     Name: Placement date: Placement time: Site: Days:    Airway ETT Oral 7.5  07/30/20   --   Oral  5              $ FVC / Vital Capacity (liters) : (Pt not following) (08/03/20 0525)  NIF (cm H2O) : (pt not following.) (08/03/20 0525)  Rapid Shallow Breathing Index (RR/VT): 70 (08/03/20 0525)  Plateau Pressure: 16 (08/02/20 1927)  Static Compliance (ml / cm H2O): 48.5 (08/03/20 1426)      Sputum/Suction   Cough: Productive (08/03/20 1035)  Sputum Amount: Small (08/03/20 1200)  Sputum Color: White;Clear;Yellow (08/03/20 1200)  Sputum Consistency: Thin (08/03/20 1200)  Events/Summary/Plan: Pt placed on spont tolerating well  (08/03/20 1300)

## 2020-08-03 NOTE — THERAPY
PT Cardiac Rehab orders received. Pt remains intubated and not appropriate for cardiac rehab education at this time. Current orders will be DC'ed. Please REORDER PT Evaluation and Treatment when pt is stable for mobility and will initiate cardiac rehab education as pt is appropriate to receive education.  Thanks    Cheyanne Rabago, PT, DPT Pager: [phone]

## 2020-08-03 NOTE — PROGRESS NOTES
Progress Note    CC: absent pulses in LLE    Interval History: 60 y.o. male who presented 7/30/2020 with STEMI and acute heart failure w/ EF 25-30% taken emergently for impella, cath/PCI, has residual untreated RCA dz. Had vfib arrest shortly after stenting. Remains intubated/sedated, was on pressures until recently.      Unknown but apparent hx of severe vascular disease, unknown if any prior interventions. Developed a cold LLE after impella was placed. This was managed by cardiology team with an external R/L fem-fem circuit. After removal of the impella and fem/fem, there were no appreciable pulses in the feet.        Review of Systems  Negative for chest pain or SOB    Medications  Prior to Admission Medications   Prescriptions Last Dose Informant Patient Reported? Taking?   aspirin EC 81 MG EC tablet UNK at [facility] Patient's Home Pharmacy No No   Sig: Take 1 Tab by mouth every day.   atorvastatin (LIPITOR) 80 MG tablet UNK at [facility] Patient's Home Pharmacy Yes No   Sig: Take 80 mg by mouth every day.   baclofen (LIORESAL) 10 MG Tab UNK at [facility] Patient's Home Pharmacy Yes No   Sig: take 1 tablet by mouth four times a day with food or milk   celecoxib (CELEBREX) 200 MG Cap UNK at [facility] Patient's Home Pharmacy Yes No   Sig: Take 200 mg by mouth 2 times a day.   clopidogrel (PLAVIX) 75 MG Tab UNK at [facility] Patient's Home Pharmacy Yes No   Sig: Take 75 mg by mouth every day.   cyclobenzaprine (FLEXERIL) 10 MG Tab UNK at [facility] Patient's Home Pharmacy Yes No   Sig: take 1 tablet by mouth three times a day if needed for SPASMS   esomeprazole (NEXIUM) 20 MG capsule UNK at [facility] Patient's Home Pharmacy Yes No   Sig: Take 20 mg by mouth every morning before breakfast.   ibuprofen (MOTRIN) 800 MG Tab UNK at [facility] Patient's Home Pharmacy Yes No   Sig: Take 800 mg by mouth every 8 hours as needed.   isosorbide mononitrate SR (IMDUR) 30 MG TABLET SR 24 HR UNK at [facility] Patient's Home Pharmacy Yes No   Sig: Take 30 mg by mouth every morning.      metoprolol SR (TOPROL XL) 25 MG TABLET SR 24 HR UNK at [facility] Patient's Home Pharmacy No No   Sig: Take 1 Tab by mouth every day.   nitroGLYCERIN (NITROSTAT) 0.3 MG SL tablet UNK at [facility] Patient's Home Pharmacy No No   Sig: Place 1 Tab under tongue as needed for Chest Pain.      Facility-Administered Medications: None         Physical Exam  Vitals:    08/03/20 0800   BP:    Pulse: (!) 109   Resp: (!) 24   Temp: 37.9 °C (100.2 °F)   SpO2: 100%       Pulse/Extremity Exam:    Femorals:        Right monphasic       Left monophasic     Popliteal       Right absent       Left absent    Pedal Pulses:       Right DP absent       Right PT monophasic       Left DP absent       Left PT absent    Wounds: None       General appearance: intubated/sedated, ill-appearing  Neuro: not alert/oriented  Lungs: No inspiratory stridor or wheezing  CV: tachy  Abdomen: Soft, nondistended  Skin: No rashes, wounds. Cold from the left knee distally. Left thigh is warm. Blanched skin on lower L leg but mottling not appreciated.       Labs reviewed today:  Recent Labs     08/01/20  0504 08/02/20 0358 08/03/20  0408   WBC 15.7* 13.6* 10.1   RBC 2.52* 2.31* 2.42*   HEMOGLOBIN 8.1* 7.3* 7.7*   HEMATOCRIT 23.9* 22.2* 23.0*   MCV 94.8 96.1 95.0   MCH 32.1 31.6 31.8   MCHC 33.9 32.9* 33.5*   RDW 53.2* 52.9* 51.5*   PLATELETCT 128* 78* 90*   MPV 11.3 11.0 11.9     Recent Labs     08/01/20  0504 08/02/20  0358 08/03/20  0408   SODIUM 138 137 140   POTASSIUM 3.9 4.2 3.5*   CHLORIDE 111 110 107   CO2 15* 14* 19*   GLUCOSE 141* 152* 130*   BUN 29* 26* 23*   CREATININE 1.22 0.99 0.96   CALCIUM 7.8* 7.9* 8.1*     Recent Labs     08/01/20  0019 08/01/20  0504 08/02/20  0358 08/03/20  0408   ALTSGPT 11  --  27 31   ASTSGOT 43  --  115* 132*   ALKPHOSPHAT 24*  --  26* 28*   TBILIRUBIN 0.7  --  0.4 0.5   GLUCOSE 181* 141* 152* 130*     Recent Labs     08/02/20  0615 08/02/20  2350 08/03/20  0625   APTT 116.1* 47.1* 114.9*             No results for input(s):  TROPONINI in the last 72 hours.    Urinalysis:    No results found       Assessment/Plan & Medical Decision-Making:  The patient has left lower extremity  ischemia. He is s/p NSTEMI/Cardiogenic shock s/p PCI/LAD stenting, impella, arrest, and has untreated RCA dz.     Mid/distal SFA is occluded. There is weak reconstitution in the pop and patent tibials. No images of the foot were saved, but note is made there was no flow in the foot. Currently no wounds.    Poor operative candidate for a revasc procedure at this point. As long as there are no systemic effects of ischemia, continue conservative management for now.    Thank you for involving us in this patient's care. Please call with questions.    Shanika Bean PA-C  Vascular Surgery   [facility]  Office: [phone]

## 2020-08-03 NOTE — CONSULTS
Date of Service  8/2/2020    Reason For Consult  Absent lower extremity pulses     Requesting Physician  Hamzah Jaquez MD    Consulting Physician  Leonel Coppola M.D.    Primary Care Physician  Cal Johnston M.D. (Inactive)    Chief Complaint  Unable to obtain d/t intubation/sedation    History of Present Illness  Pt is intubated/sedated. Entirety of HPI obtained from review of records and speaking w/ team.    60 y.o. male who presented 7/30/2020 with STEMI and acute heart failure w/ EF 25-30% taken emergently for impella, cath/PCI, has residual untreated RCA dz. Had vfib arrest shortly after stenting. Remains intubated/sedated, was on pressures until recently.    Apparently has hx of severe vascular disease, unknown what, if any prior interventions he's had, developed a cold LLE after impella was placed. This was managed by cardiology team with an external R/L fem-fem circuit. After removal of the impella and fem/fem, there were no appreciable pulses in the feet.     Review of Systems  Review of Systems   Unable to perform ROS: Critical illness       Past Medical History  Past Medical History:   Diagnosis Date   • Arthritis     ankles, hands   • Cataract    • High cholesterol    • Hypertension    • Myocardial infarct (HCC)     x 5       Surgical History  Past Surgical History:   Procedure Laterality Date   • CORONARY ARTERY BYPASS, 3  2008   • LUMBAR FUSION ANTERIOR  2003    L3,4,5        Family History  No family history on file.     Social History  Social History     Socioeconomic History   • Marital status: Single     Spouse name: Not on file   • Number of children: Not on file   • Years of education: Not on file   • Highest education level: Not on file   Occupational History   • Not on file   Social Needs   • Financial resource strain: Not on file   • Food insecurity     Worry: Not on file     Inability: Not on file   • Transportation needs     Medical: Not on file     Non-medical: Not on file   Tobacco  Use   • Smoking status: Current Every Day Smoker     Packs/day: 1.00     Years: 46.00     Pack years: 46.00     Types: Cigarettes   • Smokeless tobacco: Never Used   Substance and Sexual Activity   • Alcohol use: Yes     Comment: 3-4 beers a day   • Drug use: Not on file   • Sexual activity: Not on file   Lifestyle   • Physical activity     Days per week: Not on file     Minutes per session: Not on file   • Stress: Not on file   Relationships   • Social connections     Talks on phone: Not on file     Gets together: Not on file     Attends Muslim service: Not on file     Active member of club or organization: Not on file     Attends meetings of clubs or organizations: Not on file     Relationship status: Not on file   • Intimate partner violence     Fear of current or ex partner: Not on file     Emotionally abused: Not on file     Physically abused: Not on file     Forced sexual activity: Not on file   Other Topics Concern   • Not on file   Social History Narrative   • Not on file       Medications  Prior to Admission Medications   Prescriptions Last Dose Informant Patient Reported? Taking?   aspirin EC 81 MG EC tablet UNK at [facility] No No   Sig: Take 1 Tab by mouth every day.   atorvastatin (LIPITOR) 80 MG tablet UNK at [facility] Yes No   Sig: Take 80 mg by mouth every day.   baclofen (LIORESAL) 10 MG Tab UNK at [facility] Yes No   Sig: take 1 tablet by mouth four times a day with food or milk   celecoxib (CELEBREX) 200 MG Cap UNK at [facility] Yes No   Sig: Take 200 mg by mouth 2 times a day.   clopidogrel (PLAVIX) 75 MG Tab UNK at [facility] Yes No   Sig: Take 75 mg by mouth every day.   cyclobenzaprine (FLEXERIL) 10 MG Tab UNK at [facility] Yes No   Sig: take 1 tablet by mouth three times a day if needed for SPASMS   esomeprazole (NEXIUM) 20 MG capsule UNK at [facility] Yes No   Sig: Take  20 mg by mouth every morning before breakfast.   ibuprofen (MOTRIN) 800 MG Tab UNK at [facility] Yes No   Sig: Take 800 mg by mouth every 8 hours as needed.   isosorbide mononitrate SR (IMDUR) 30 MG TABLET SR 24 HR UNK at [facility] Yes No   Sig: Take 30 mg by mouth every morning.   metoprolol SR (TOPROL XL) 25 MG TABLET SR 24 HR UNK at [facility] No No   Sig: Take 1 Tab by mouth every day.   nitroGLYCERIN (NITROSTAT) 0.3 MG SL tablet UNK at [facility] No No   Sig: Place 1 Tab under tongue as needed for Chest Pain.      Facility-Administered Medications: None       Current Facility-Administered Medications   Medication Dose Route Frequency Provider Last Rate Last Dose   • LORazepam (ATIVAN) injection 4 mg  4 mg Intravenous Q15 MIN PRN Asher Hines M.D.        Or   • LORazepam (ATIVAN) injection 3 mg  3 mg Intravenous Q15 MIN PRN Asher Hines M.D.        Or   • LORazepam (ATIVAN) injection 2 mg  2 mg Intravenous Q15 MIN PRN Asher Hines M.D.   2 mg at 08/02/20 2124    Or   • LORazepam (ATIVAN) injection 1 mg  1 mg Intravenous Q15 MIN PRN Asher Hines M.D.       • polyethylene glycol/lytes (MIRALAX) PACKET 1 Packet  1 Packet Enteral Tube BID Neo Mayers M.D.   Stopped at 08/02/20 1800   • bisacodyl (DULCOLAX) suppository 10 mg  10 mg Rectal DAILY Neo Mayers M.D.   10 mg at 08/02/20 0740   • propofol (DIPRIVAN) injection  0-80 mcg/kg/min Intravenous Continuous Neo Mayers M.D. 16.2 mL/hr at 08/02/20 2127 30 mcg/kg/min at 08/02/20 2127   • thiamine tablet 100 mg  100 mg Enteral Tube BID Neo Mayers M.D.   100 mg at 08/02/20 1728   • heparin injection 3,200 Units  3,200 Units Intravenous PRN Hamzah Jaquez M.D.   Stopped at 08/02/20 1721    And   • heparin infusion 25,000 units in 500 mL 0.45% NACL   Intravenous Continuous Hamzah KITA Jaquez 24 mL/hr at 08/02/20 1733 1,200 Units/hr at 08/02/20 1733   • ondansetron (ZOFRAN)  syringe/vial injection 4 mg  4 mg Intravenous Q4HRS PRN Son Stoddard Jr., D.O.   4 mg at 08/02/20 2126   • Pharmacy Consult: Enteral tube insertion - review meds/change route/product selection  1 Each Other PHARMACY TO DOSE Neo Mayers M.D.       • ticagrelor (BRILINTA) tablet 90 mg  90 mg Enteral Tube BID Neo Mayers M.D.   90 mg at 08/02/20 1728   • acetaminophen (TYLENOL) tablet 650 mg  650 mg Enteral Tube Q6HRS PRN Neo Mayers M.D.   650 mg at 08/01/20 1615   • DOBUTamine (DOBUTREX) 1 mg/mL premix infusion  0-20 mcg/kg/min Intravenous Continuous Joe Mcgraw M.D.   Stopped at 08/02/20 0352   • dexmedetomidine (PRECEDEX) 400 mcg/100mL NS premix infusion  0.1-1.5 mcg/kg/hr Intravenous Continuous Neo Mayers M.D.   Stopped at 08/02/20 0720   • norepinephrine (Levophed) infusion 8 mg/250 mL (premix)  0-30 mcg/min Intravenous Continuous Neo Mayers M.D.   Stopped at 08/02/20 1500   • piperacillin-tazobactam (ZOSYN) 3.375 g in  mL IVPB  3.375 g Intravenous Q8HRS Son Stoddard Jr., D.O. 25 mL/hr at 08/02/20 2048 3.375 g at 08/02/20 2048   • aspirin (ASA) chewable tab 81 mg  81 mg Enteral Tube DAILY Hamzah Jaquez M.D.   81 mg at 08/02/20 0504   • atorvastatin (LIPITOR) tablet 40 mg  40 mg Enteral Tube Q EVENING Hamzah Jaquez M.D.   40 mg at 08/02/20 1727   • Respiratory Therapy Consult   Nebulization Continuous RT Zachariah Shipley M.D.       • ipratropium-albuterol (DUONEB) nebulizer solution  3 mL Nebulization Q2HRS PRN (RT) Zachariah Shipley M.D.       • famotidine (PEPCID) tablet 20 mg  20 mg Enteral Tube Q12HRS Zachariah Shipley M.D.   20 mg at 08/02/20 1727    Or   • famotidine (PEPCID) injection 20 mg  20 mg Intravenous Q12HRS Zachariah Shipley M.D.   20 mg at 07/31/20 0439   • senna-docusate (PERICOLACE or SENOKOT S) 8.6-50 MG per tablet 2 Tab  2 Tab Enteral Tube BID Zachariah Shipley M.D.   Stopped at 08/02/20 1800    And   • polyethylene glycol/lytes (MIRALAX) PACKET 1 Packet  1  Packet Enteral Tube QDAY PRN Zachariah Shipley M.D.        And   • magnesium hydroxide (MILK OF MAGNESIA) suspension 30 mL  30 mL Enteral Tube QDAY PRN Zachariah Shipley M.D.   30 mL at 08/01/20 1711    And   • bisacodyl (DULCOLAX) suppository 10 mg  10 mg Rectal QDAY PRN Zachariah Shipley M.D.       • MD Alert...ICU Electrolyte Replacement per Pharmacy   Other PHARMACY TO DOSE Zachariah Shipley M.D.       • lidocaine (XYLOCAINE) 1 % injection 1-2 mL  1-2 mL Tracheal Tube Q30 MIN PRN Zachariah Shipley M.D.   2 mL at 08/02/20 0424   • fentaNYL (SUBLIMAZE) injection 50 mcg  50 mcg Intravenous Q15 MIN PRN Zachariah Shipley M.D.        And   • fentaNYL (SUBLIMAZE) injection 100 mcg  100 mcg Intravenous Q15 MIN PRN Zachariah Shipley M.D.   100 mcg at 08/02/20 1500    And   • fentaNYL (SUBLIMAZE) 50 mcg/mL in 50mL (Continuous Infusion)   Intravenous Continuous Zachariah Shipley M.D. 1 mL/hr at 08/02/20 0700 50 mcg/hr at 08/02/20 0700   • insulin regular (HumuLIN R,NovoLIN R) injection  2-9 Units Subcutaneous Q6HRS Zachariah Shipley M.D.   Stopped at 08/01/20 0600    And   • dextrose 50% (D50W) injection 50 mL  50 mL Intravenous Q15 MIN PRN Zachariah Shipley M.D.       • furosemide (LASIX) injection 20 mg  20 mg Intravenous BID DIURETIC Glenn Boss M.D.   20 mg at 08/02/20 1730       Allergies  No Known Allergies      Physical Exam  Temp:  [37.2 °C (99 °F)-37.7 °C (99.9 °F)] 37.5 °C (99.5 °F)  Pulse:  [] 103  Resp:  [12-32] 23  SpO2:  [91 %-100 %] 100 %    Pulse/Extremity Exam:    Femorals:        Right: palpable       Left monophasic  Popliteals:       Right absent       Left absent  Pedal Pulses:       Right DP absent       Right PT monophasic       Left DP absent       Left PT absent     Both feet symmetrically cool. Early ischemic changes on the left. Pt will intermittently move the right foot. No movement on the left.    Wounds: None    General appearance: intubated/sedated, ill-appearing  Psych: unable to assess  Neuro:  Unable to assess, pt not following commands   Neck: full range of motion  Lungs: No inspiratory stridor or wheezing  CV: RRR  Abdomen: Soft, NT/ND  Skin: No rashes    Labs Reviewed Today:  Recent Labs     08/01/20  0019 08/01/20  0504 08/02/20  0358   WBC 14.0* 15.7* 13.6*   RBC 2.52* 2.52* 2.31*   HEMOGLOBIN 7.9* 8.1* 7.3*   HEMATOCRIT 24.1* 23.9* 22.2*   MCV 95.6 94.8 96.1   MCH 31.3 32.1 31.6   MCHC 32.8* 33.9 32.9*   RDW 52.1* 53.2* 52.9*   PLATELETCT 128* 128* 78*   MPV 11.0 11.3 11.0     Recent Labs     08/01/20  0019 08/01/20  0504 08/02/20  0358   SODIUM 139 138 137   POTASSIUM 3.9 3.9 4.2   CHLORIDE 111 111 110   CO2 15* 15* 14*   GLUCOSE 181* 141* 152*   BUN 29* 29* 26*   CREATININE 1.38 1.22 0.99   CALCIUM 7.7* 7.8* 7.9*     Recent Labs     07/31/20  1120 08/01/20  0019 08/01/20  0504 08/02/20  0358   ALTSGPT 12 11  --  27   ASTSGOT 50* 43  --  115*   ALKPHOSPHAT 28* 24*  --  26*   TBILIRUBIN 0.5 0.7  --  0.4   GLUCOSE 143* 181* 141* 152*     Recent Labs     08/01/20  0501 08/01/20  2335 08/02/20  0615   APTT 52.2* 199.2* 116.1*             No results for input(s): TROPONINI in the last 72 hours.    Urinalysis:    No results found     Imaging Reviewed Today:  I personally reviewed all non-invasive vascular testing including images, x-rays, tracings, arterial waveforms, and duplex exams relevant to this admission. My interpretation is below:    Arterial Duplex: No flow-limiting lesion on the right. Severely diminished monophasic flow in the L CFA. Profunda is patent. Mid/distal SFA occluded. Monophasic reconstituted flow in the popliteal and tibials.    Assessment/Plan & Medical Decision-Making  60M admitted w/ NSTEMI/cardiogenic shock s/p PCI/LAD stenting, impella, and arrest. Concern for LLE ischemia.    Unfortunately, the pt is not a candidate for a revasc procedure at this point given his overall condition. Unclear how long the leg has been ischemic.Mid/distal SFA is occluded. There is weak  reconstitution in the pop and patent tibials. No images of the foot were saved, but note is made there was no flow in the foot. Currently no wounds.    The leg/foot may declare itself in time. As long as there are no systemic effects of ischemia, would manage conservatively for now.      Leonel Coppola MD  Vascular Surgeon  [facility]  Office: [phone]

## 2020-08-03 NOTE — DIETARY
Nutrition Services Update:   · TF currently off and feeding tube out. RN states OGT placed to suction due to pt with emesis.   · TF goal is Impact Peptide 1.5 @  55 mL/hr.   · Pt continues on Vent and propofol running @ 8.1 ml/hr (214 kcals/day), RN states plan is to wean off propofol and switch to precedex.   · Possible pressure ulcer to left pretibial  Noted on , no wound care notes. Pictures show reddened area. TF will meet 100% of RDI for vitamins and minerals.  · Pressors last given yesterday.   · Will continue with Current TF formula as it best meets protein needs and helps optimize tolerance.       Calculation/Equation: MSJ x 1.1 - 1.2 = 1875- 2046 kcals/day; [place] [employer] Equation (PSU) 4306v=0095 kcals/day (based on vent setting of 10.2 L/min and max temp of 38 celcius). Needs based on first measured wt of 89.7 kg (per bedscale on )  Total Calories / day: 8076-1858 (Calories / k-23)  Total Grams Protein / day: 108 - 126 (Grams Protein / k.2 - 1.4)        Plan:  1) Resume TF as medically feasible.  2) Without propofol: Impact Peptide 1.5 @ 55 ml/hr, which provides 1980 kcals, 124 grams of protein and 1016 mL of free water per day.  3) If unable to wean off propofol: TF with Propofol goal will be Impact Peptide 1.5 @ 50 ml/hr to provide 1800 kcals/day, 113 g pro/day, and 924 ml free water/day. TF + propofol providing 2014 kcals/day.  3) Consult wound care as needed.  4) Fluids per MD.     RD following.

## 2020-08-03 NOTE — PROGRESS NOTES
Reason for consultation /admission : Anterior STEMI/cardiogenic shock  Remote CABG all SVG grafts occluded, LIMA atretic  S/p LAD stents x3  Residual long hgh grade mid RCA and prox PDA, 70% mid LCX co-dominant  S/p Impella  Left leg ischemia (severe external iliac ?occluded SFA)  Acute anemia? Blood loss (Hb down from 12 to 8)    Remains critically   Hemodynamic stable  On no vasopressor  Slightly tachycardic  Sedated  Vascular surgery consultation recommendation noted    Review of systems;  Unable to perform due to pt's condition/intuabted/sedated      Temp:  [37.5 °C (99.5 °F)-38 °C (100.4 °F)] 37.9 °C (100.2 °F)  Pulse:  [] 109  Resp:  [19-38] 24  BP: ()/(59-79) 119/79  SpO2:  [94 %-100 %] 100 %  GENERAL not in acute distress, not dyspnic at rest  Head atraumatic, normocephalic  Eyes EOMI  ENT neck supple, no JVD, no carotid bruits or thyromegaly  Lung good expansion, distant sound, no rales or wheezing  Heart RRR, left leg especially below the knee is cold to touch no palpable pulse left foot,   no murmur, no gallop or rub  Abd mildly distended  Ext no edema  Skin + ecchymosis genital area and both groins  Musculoskeletal no deformity  Neuro grossly intact  Psych sedated    Monitor reviewed by me showed no significant ventricular or atrial dysrhythmias.    Labs: Cr 0.96, K+ 3.5  Hb 7.7 Plt 90K    Arterial duplex 8/2;  LLE:  No flow in the left common femoral artery.   There is monophasic flow    in the profunda and mid femoral which then becomes occluded.    There is a collateral at the very distal femoral artery with reconstitution of  low    monophasic flow to the proximal popliteal artery.  No flow seen at the ankle.    RLE:  Rt CFA/Profunda not visualized. Normal flow in the reander of the RLE.    Assessment and plans    1.  Status post LAD stent for anterior MI with residual right coronary artery left circumflex stenosis  Hemodynamically stable  Is still intubated but requiring the typically  low oxygen support at 30%  We will carefully uptitrate afterload reducer  Consider revascularized RCA and and or circumflex  Continue DAPT and statin    2.  Left leg ischemia  Angiography during cardiac catheterization showed severe left external iliac stenosis but there is no images of the renal artery are below the knee vessels.  Reportedly was ambulatory and able to work without significant limitation prior to this current cardiac event.  He may benefit from intervention of the external iliac artery to improve inflow.  We will discussed with vascular surgery team.    Still on IV heparin with some decrease in hemoglobin.     3.  Respiratory failure probably with combination of heart failure and respiratory issue  Careful diuresis.  Further management per primary/intensivist    4.  Anemia.  Acute blood loss    5.  Thrombocytopenia acute? etiology      Will follow    Please note that this dictation was created using voice recognition software. I have worked with consultants from the vendor as well as technical experts from [facility] [employer] to optimize the interface. I have made every reasonable attempt to correct obvious errors, but I expect that there are errors of grammar and possibly content I did not discover before finalizing the note

## 2020-08-04 PROBLEM — I47.29 NSVT (NONSUSTAINED VENTRICULAR TACHYCARDIA) (HCC): Status: ACTIVE | Noted: 2020-01-01

## 2020-08-04 NOTE — PROGRESS NOTES
Reason for consultation /admission : Acute MI/Cardiogenic shock    Appreciate vascular surgery assistance  Angiogram and possible intervention of LLE planned for this PM  Back on low dose norepinephrine  ET tube had to be exchanged last PM    Had 4 seconds NSVT overnight but K+ is low (3 mEQ/L) this AM (on IV furosemide) and had issue with vomiting yesterday  Being replaced with IV K+      Review of systems; unable to perform, intubated/sedated      Temp:  [37.7 °C (99.9 °F)-38.1 °C (100.6 °F)] 38.1 °C (100.6 °F)  Pulse:  [] 90  Resp:  [18-37] 20  BP: ()/(49-94) 85/60  SpO2:  [93 %-100 %] 98 %  GENERAL not in acute distress, not dyspnic at rest  Head atraumatic, normocephalic  Eyes EOMI  Lung good expansion, distant sound, no rales or wheezing  Heart RRR, left leg especially below the knee is cold to touch no palpable pulse left foot,   Rt toes somewhat mottling  no murmur, no gallop or rub  Abd mildly distended  Ext no edema  Skin + ecchymosis genital area and both groins  Musculoskeletal no deformity  Neuro grossly intact  Psych sedated     Monitor reviewed by me showed no significant ventricular or atrial dysrhythmias.     Labs: Cr 0.96, K+ 3.5  Hb 7.7 Plt 90K     Arterial duplex 8/2;  LLE:  No flow in the left common femoral artery.   There is monophasic flow    in the profunda and mid femoral which then becomes occluded.    There is a collateral at the very distal femoral artery with reconstitution of  low    monophasic flow to the proximal popliteal artery.  No flow seen at the ankle.     RLE:  Rt CFA/Profunda not visualized. Normal flow in the reander of the RLE.     Assessment and plans     1.  Status post LAD stent for anterior MI with residual right coronary artery left circumflex stenosis  Hemodynamically stable back on low dose norepinephrine  Still intubated but requiring low oxygen support at 30%  Consider revascularized RCA and and or circumflex  Continue DAPT and statin     2.  Left leg  ischemia  Angiography during cardiac catheterization showed severe left external iliac stenosis but there is no images of the renal artery are below the knee vessels.  Reportedly was ambulatory and able to work without significant limitation prior to this current cardiac event.  He may benefit from intervention of the external iliac artery to improve inflow.    Angiography with possible intervention planned this PM. Will hold heparin at 12:30 PM per Dr. Coppola's request.      3.  NSVT  Will closely monitor, replace K+ and check Mg++    4. Respiratory failure probably with combination of heart failure and respiratory issue  Continue careful diuresis.  Further management per primary/intensivist     5.  Anemia.  Acute blood loss stable     6.  Thrombocytopenia acute? Etiology, improved    7. Hypokalemia likely diuretic induced +/- vomiting   being replaced      Will follow    Please note that this dictation was created using voice recognition software. I have worked with consultants from the vendor as well as technical experts from [employer] to optimize the interface. I have made every reasonable attempt to correct obvious errors, but I expect that there are errors of grammar and possibly content I did not discover before finalizing the note

## 2020-08-04 NOTE — HEART FAILURE PROGRAM
Cardiovascular Nurse Navigator () Advanced Heart Failure Program Inpatient Progress Note:  STEMI and cardiogenic shock with EF of 25%. S/p CABG and all SVG grafts occluded. S/p impella, LLE ischemia, acute anemia. Remains intubated in T-627, not the time to audit for GDMT for ACS or HF.     Please see below for required HF and ACS measures should patient become well enough to discharge.    Daily Nurse: please begin to fill out the HF checklist (pink sheet in hard chart) and use it to guide your daily care.    Discharge Nurse: please ensure completeness of the HF checklist (pink sheet in hard chart) and have it co-signed by the charge RN before the patient leaves the hospital.     Thank you, Nancy Cardio RN Navigator [phone]    HF Measures:  1. Documentation of LV systolic function (echo or cath) PTA, during this hospitalization, or plan to assess post discharge or reason for not assessing documented  2. Documentation of fluid intake and urine output every nursing shift  3. 2 hour post diuretic assessment documented 2 hours after diuretic given  4. HF Patient Education using the Living Well With Heart Failure Booklet and Symptom Tracker documented every nursing shift  5. Nutrition consult for diet education  6. Daily weights (one weight documented every 24 hours) on a standing scale unless standing is contraindicated in which case bed scale can be used - have patient write weight on symptom tracker  7. For LVEF less than or equal to 40%, ACE-I, ARNI or ARB prescribed at discharge   8. For LVEF less than or equal to 40%, an Evidence Based Beta Blocker (bisoprolol, carvedilol, toprol xl) must be prescribed at discharge  9. For LVEF less than or equal to 35% aldosterone blockade prescribed at discharge  10. The combination of hydralazine and isosorbide dinitrate is recommended to reduce morbidity and mortality for patients self-described  Americans with NYHA class III-IV HFrEF (EF 40% or less),  receiving optimal therapy with ACE inhibitors and beta blockers, unless contraindicated (Class I, ESTRELLA: A).  11. If a HF patient is diabetic or is newly diagnosed with DM: prescribed diabetes treatment at discharge in the form of glycemic control (diet or anti-hyperglycemic medication) or f/u appointment for diabetes management scheduled at discharge.  12. If a HF patient has diabetes: prescribed lipid lowering medication at discharge  13. Documented smoking cessation advice or counseling  14. If a HF patient has a-fib: anticoagulation is prescribed upon discharge or contraindication is documented  15. Screening for and administering immunizations as long as no contraindications: Pneumonia (regardless of age) and Influenza  16. Written discharge instructions include:  ? Daily weights  ? Record weight on tracker  ? Bring tracker to appointments  ? Call MD for weight gain of 3lb /day or 5lb/week  ? HF medication teaching  ? Low sodium diet  ? Follow up appointment within seven calendar days of d/c must include: date, time and location  ? Activity  ? Worsening symptoms    What if any of the above HF measures are contraindicated?  ? Request that the discharging provider document the medication/intervention and the contraindication specifically in a progress note  ? For example: “no CHF meds due to hypotension” is not enough. It needs to say: “No ACE-I, ARNI, ARB due to hypotension”; “No Beta Blockade due to bradycardia”…       Meds to Beds BEDSIDE NURSING RESPONSIBILITIES:    If not already done, please assess patient for Meds to Beds Opt-In which can be found in the admit navigator. Evidence shows that meds to beds improves medication compliance and patient outcomes.      ACS Measures:    1. ASA prescribed at discharge  2. P2Y12 Inhibitor prescribed at discharge; Please note: for ACS patients who are treated medically without PCI and stenting, DAPT has been demonstrated to reduce recurrent CV events. Source: 2013  ACCF/AHA Guideline for the management of STEMI  3. Beta blockade prescribed at discharge, if patient also has HFrEF (EF less than or equal to 40%), this needs to be one of the three evidence based beta blockers: carvedilol, bisoprolol, Toprol XL  4. High intensity statin prescribed at discharge (atorvastatin 40 mg or rosuvastatin 20 mg)  5. ACE-I or ARB prescribed on discharge for LVEF less than 40%  6. Aldosterone blockade prescribed for patients with EF less than 40% AND history of diabetes mellitus OR history of heart failure, heart failure on presentation or in-hospital event  7. Intensive Cardiac Rehab referral order is placed  8. Use the Acute Coronary Syndrome discharge instructions to document that patient has been provided with the contact information for Intensive Cardiac Rehab  9. Evaluation of LV systolic function can be by angiogram, or echo before discharge, can not be a future plan for LVSF assessment  10. Daily documentation in education tab of ACS education  11. Documentation of smoking cessation counseling       What if any of the above ACS Measures are contraindicated?    · Request that the discharging provider document the medication/intervention and the contraindication specifically in a progress note  · For example: “no ACE-I meds due to hypotension” is not enough. It needs to say: “No ACE-I, ARNI, ARB due to hypotension”; “No Beta Blockade due to bradycardia”…

## 2020-08-04 NOTE — OP REPORT
OPERATIVE REPORT      Patient:  Humberto Shannon     Procedure Date:  08/04/20    Indication:  LLE arterial thrombosis    Pre-Operative Diagnosis:  CAD s/p recent STEMI  Hx of cardiogenic shock  LLE arterial thrombosis    Post-Operative Diagnosis:  Same    Procedure(s):   Diagnostics  Attempted US guided access of right common femoral artery  US guided access of left common femoral artery    Surgeon:  Leonel Coppola M.D.    Assistant:  None    Anesthesia:  Moderate Sedation    EBL:  5cc    Radiation:  2.86 mGy    Contrast:  0 cc    Specimen:  None    Complications:  None    Findings:  Visualization of the right common femoral artery was very poor. The artery could not be definitively identified, likely due to overlying hematoma and edema.     I was able to access the L CFA but there was no flash or blood return, consistent with occlusion.    Procedure:  Informed consent was obtained from the patient's son in the pre-operative holding area. All risks, benefits, and alternatives were explained and he elected to proceed. The patient was brought to the interventional suite and placed on the table in a supine position. MAC was induced by anesthesia. A time-out was performed verifying the correct site of surgery. The patient was prepped and draped in the usual sterile fashion.    I attempted to access the right common femoral artery under US guidance but visualization was very poor and the vessel could not be definitively identified.    The left common femoral artery was accessed under US visualization. Under ultrasound, the vessel was not pulsatile and appeared to have acute clot. Images were saved to the medical record. I was able to advance a micropuncture sheath but there was no flash or blood return.    The procedure was aborted.       Leonel Coppola M.D.  Vascular Surgeon  [facility]

## 2020-08-04 NOTE — PALLIATIVE CARE
Palliative Care follow-up  PC RN called [facility] to request AD per son Attila's request. Per [facility] there is not an Advance Directive on file for the patient.       Updated: Kristy ROCHA RN     Plan: PC RN updated Attila regarding AD request.     Thank you for allowing Palliative Care to support this patient and family. Contact [phone] for additional assistance, change in patient status, or with any questions/concerns.

## 2020-08-04 NOTE — PROGRESS NOTES
Critical Care Progress Note    Date of admission  7/30/2020    Chief Complaint  60 y.o. male admitted 7/30/2020 with anterior STEMI, status post LAD stenting, cardiogenic shock, respiratory failure    Hospital Course    60 y.o. male who presented 7/30/2020 in transfer from [facility] in [facility] with subtle ST elevation anterior MI.  He has a history of prior CABG, vascular disease and ongoing smoking by report.  He apparently presented earlier in the day with left arm pain and dyspnea with rapid deterioration in his condition requiring intubation and transfer to [facility] for further care.  He was transferred on a nitroglycerin drip to treat significant hypertension initially.  Echocardiogram showed significant drop in ejection fraction to 25-30% with pulmonary edema.  He was taken urgently to the Cath Lab where he underwent successful PCI/stents to the LAD.  He had a brief episode of V. fib arrest requiring 2-3 minutes of CPR and 1 mg of epinephrine.  A left femoral Impella ventricular assist device was placed and temporary right to left external femoral femoral bypass was placed due to low flow distal to the Impella catheter placement with significant underlying iliac and femoral disease noted with multiple collaterals by report.  Mosca-Josiah catheter is in place.  He is currently intubated on full ventilator support, sedated and unresponsive.  Hemodynamics reviewed in detail and direct handoff performed with cardiology at bedside.      Interval Problem Update  Reviewed last 24 hour events:  Remains critically ill but improving  Pt had hypotensive episodes overnight. Had to get restarted on NE and dobutamine gtt.   This am we were able to wean them off.   Off propofol and fentanyl gtt. On dex gtt at 0.5, RASS 0  HR in 90s, SBP in 90-120s, MAP >65  Febrile with Tm 38.1  Negative 500cc fluid balance in the past 24 hours, +1.3L since admission  Tolerating tube  feed  Remains on low vent setting. No further trach secretions.   S/p ET tube exchange 8/3 to 8.0    Review of Systems  Review of Systems   Unable to perform ROS: Acuity of condition        Vital Signs for last 24 hours   Temp:  [37.7 °C (99.9 °F)-38.1 °C (100.6 °F)] 38.1 °C (100.6 °F)  Pulse:  [] 90  Resp:  [18-37] 20  BP: ()/(49-94) 85/60  SpO2:  [93 %-100 %] 98 %    Hemodynamic parameters for last 24 hours  CVP:  [10 MM HG-24 MM HG] 24 MM HG  PCWP:  [17 MM HG-22 MM HG] 17 MM HG  CO:  [4-5.5] 4.6  CI:  [2-2.7] 2.3    Respiratory Information for the last 24 hours  Vent Mode: APVCMV  Rate (breaths/min): 20  Vt Target (mL): 440  PEEP/CPAP: 8  P Support: 5  MAP: 11  Control VTE (exp VT): 443    Physical Exam   Physical Exam  Vitals signs and nursing note reviewed.   Constitutional:       Appearance: He is ill-appearing and toxic-appearing.      Comments: Intubated, sedated, RASS +1 to -1   HENT:      Head: Normocephalic and atraumatic.      Mouth/Throat:      Comments: ET tube in place  Eyes:      Conjunctiva/sclera: Conjunctivae normal.   Cardiovascular:      Rate and Rhythm: Normal rate and regular rhythm.      Pulses: Normal pulses.      Heart sounds: Normal heart sounds. No murmur.      Comments: External femorofemoral bypass in place, poor pulses in lower extremities  Pulmonary:      Breath sounds: Rales present. No wheezing.      Comments: diminsihed breath sounds  Abdominal:      General: Bowel sounds are normal. There is no distension.      Palpations: Abdomen is soft.      Tenderness: There is no abdominal tenderness. There is no guarding.   Musculoskeletal:         General: Swelling present.   Skin:     Capillary Refill: Capillary refill takes 2 to 3 seconds. 2-3 in right leg and >3 in left leg     Comments: Cool to the touch on the left side  Faint mottling in knee and lower ext, cool to touch extending up to left thigh  No dopplerable pulses noted in DP/TP, and popliteal, +/- femoral.     Neurological:      Comments: Heavily sedate and unable to fully assess   Psychiatric:      Comments: Unable to assess         Medications  Current Facility-Administered Medications   Medication Dose Route Frequency Provider Last Rate Last Dose   • potassium chloride in water (KCL) ivpb **Administer in ICU only** 40 mEq  40 mEq Intravenous Once Chris Ingram D.O. 50 mL/hr at 08/04/20 0752 40 mEq at 08/04/20 0752   • lidocaine (XYLOCAINE) 1 % injection 0.5 mL  0.5 mL Intradermal Once PRN Chris Ingram D.O.       • LORazepam (ATIVAN) injection 4 mg  4 mg Intravenous Q15 MIN PRN Asher Hines M.D.        Or   • LORazepam (ATIVAN) injection 3 mg  3 mg Intravenous Q15 MIN PRN Asher Hines M.D.        Or   • LORazepam (ATIVAN) injection 2 mg  2 mg Intravenous Q15 MIN PRN Asher Hines M.D.   2 mg at 08/04/20 0234    Or   • LORazepam (ATIVAN) injection 1 mg  1 mg Intravenous Q15 MIN PRN Asher Hines M.D.       • polyethylene glycol/lytes (MIRALAX) PACKET 1 Packet  1 Packet Enteral Tube BID Neo Mayers M.D.   Stopped at 08/02/20 1800   • bisacodyl (DULCOLAX) suppository 10 mg  10 mg Rectal DAILY Neo Mayers M.D.   Stopped at 08/03/20 0600   • propofol (DIPRIVAN) injection  0-80 mcg/kg/min Intravenous Continuous Neo Mayers M.D.   Stopped at 08/04/20 0011   • thiamine tablet 100 mg  100 mg Enteral Tube BID Neo Mayers M.D.   100 mg at 08/04/20 0535   • heparin injection 3,200 Units  3,200 Units Intravenous PRN Hamzah Jaquez M.D.   3,200 Units at 08/03/20 0033    And   • heparin infusion 25,000 units in 500 mL 0.45% NACL   Intravenous Continuous Hamzah Jaquez M.D. 26 mL/hr at 08/04/20 0751 1,300 Units/hr at 08/04/20 0751   • ondansetron (ZOFRAN) syringe/vial injection 4 mg  4 mg Intravenous Q4HRS PRN Son Stoddard Jr. D.SHANNAN   4 mg at 08/03/20 0647   • Pharmacy Consult: Enteral tube insertion - review meds/change route/product selection  1 Each Other PHARMACY TO DOSE Neo Mayers M.D.       •  ticagrelor (BRILINTA) tablet 90 mg  90 mg Enteral Tube BID Neo Mayers M.D.   90 mg at 08/04/20 0535   • acetaminophen (TYLENOL) tablet 650 mg  650 mg Enteral Tube Q6HRS PRN Neo Mayers M.D.   650 mg at 08/01/20 1615   • DOBUTamine (DOBUTREX) 1 mg/mL premix infusion  0-20 mcg/kg/min Intravenous Continuous Joe Mcgraw M.D. 13.5 mL/hr at 08/04/20 0431 2.5 mcg/kg/min at 08/04/20 0431   • dexmedetomidine (PRECEDEX) 400 mcg/100mL NS premix infusion  0.1-1.5 mcg/kg/hr Intravenous Continuous Neo Mayers M.D. 11.2 mL/hr at 08/04/20 0612 0.5 mcg/kg/hr at 08/04/20 0612   • norepinephrine (Levophed) infusion 8 mg/250 mL (premix)  0-30 mcg/min Intravenous Continuous Neo Mayers M.D. 5.6 mL/hr at 08/04/20 0549 3 mcg/min at 08/04/20 0549   • piperacillin-tazobactam (ZOSYN) 3.375 g in  mL IVPB  3.375 g Intravenous Q8HRS Chris Ingram D.O. 25 mL/hr at 08/04/20 0501 3.375 g at 08/04/20 0501   • aspirin (ASA) chewable tab 81 mg  81 mg Enteral Tube DAILY Hamzah Jaquez M.D.   81 mg at 08/04/20 0501   • atorvastatin (LIPITOR) tablet 40 mg  40 mg Enteral Tube Q EVENING Hamzah Jaquez M.D.   40 mg at 08/03/20 1733   • Respiratory Therapy Consult   Nebulization Continuous RT Zachariah Shipley M.D.       • ipratropium-albuterol (DUONEB) nebulizer solution  3 mL Nebulization Q2HRS PRN (RT) Zachariah Shipley M.D.       • famotidine (PEPCID) tablet 20 mg  20 mg Enteral Tube Q12HRS Zachariah Shipley M.D.   20 mg at 08/02/20 1727    Or   • famotidine (PEPCID) injection 20 mg  20 mg Intravenous Q12HRS Zachariah Shipley M.D.   20 mg at 08/04/20 0501   • senna-docusate (PERICOLACE or SENOKOT S) 8.6-50 MG per tablet 2 Tab  2 Tab Enteral Tube BID Zachariah Shipley M.D.   Stopped at 08/02/20 1800    And   • polyethylene glycol/lytes (MIRALAX) PACKET 1 Packet  1 Packet Enteral Tube QDAY PRN Zachariah Shipley M.D.        And   • magnesium hydroxide (MILK OF MAGNESIA) suspension 30 mL  30 mL Enteral Tube QDAY PRN Zachariah Shipley M.D.    30 mL at 08/01/20 1711    And   • bisacodyl (DULCOLAX) suppository 10 mg  10 mg Rectal QDAY PRN Zachariah Shipley M.D.       • MD Alert...ICU Electrolyte Replacement per Pharmacy   Other PHARMACY TO DOSE Zachariah Shipley M.D.       • lidocaine (XYLOCAINE) 1 % injection 1-2 mL  1-2 mL Tracheal Tube Q30 MIN PRN Zachariah Shipley M.D.   2 mL at 08/02/20 0424   • fentaNYL (SUBLIMAZE) injection 50 mcg  50 mcg Intravenous Q15 MIN PRN Zachariah Shipley M.D.        And   • fentaNYL (SUBLIMAZE) injection 100 mcg  100 mcg Intravenous Q15 MIN PRN Zachariah Shipley M.D.   100 mcg at 08/03/20 1722    And   • fentaNYL (SUBLIMAZE) 50 mcg/mL in 50mL (Continuous Infusion)   Intravenous Continuous Zachariah Shipley M.D. 1 mL/hr at 08/03/20 2130 50 mcg/hr at 08/03/20 2130   • insulin regular (HumuLIN R,NovoLIN R) injection  2-9 Units Subcutaneous Q6HRS Zachariah Shipley M.D.   Stopped at 08/01/20 0600    And   • dextrose 50% (D50W) injection 50 mL  50 mL Intravenous Q15 MIN PRN Zachariah Shipley M.D.       • furosemide (LASIX) injection 20 mg  20 mg Intravenous BID DIURETIC Glenn Boss M.D.   20 mg at 08/04/20 0752       Fluids    Intake/Output Summary (Last 24 hours) at 8/4/2020 0804  Last data filed at 8/4/2020 0600  Gross per 24 hour   Intake 1001.57 ml   Output 1965 ml   Net -963.43 ml       Laboratory  Recent Labs     08/02/20  0419 08/03/20  0417 08/04/20  0445   ISTATAPH 7.358* 7.452 7.537*   ISTATAPCO2 25.3* 26.9 25.6*   ISTATAPO2 70 99* 80   ISTATATCO2 15* 20 22   [id number] 94 98 97   ISTATARTHCO3 14.2* 18.8 21.7   ISTATARTBE -10* -5* -1   ISTATTEMP 37.6 C 37.9 C 37.8 C   ISTATFIO2 30 40 30   ISTATSPEC Arterial Arterial Arterial   ISTATAPHTC 7.349* 7.439 7.524*   [id number] 73 104* 84         Recent Labs     08/02/20  0358 08/03/20  0408 08/04/20  0327   SODIUM 137 140 142   POTASSIUM 4.2 3.5* 3.0*   CHLORIDE 110 107 105   CO2 14* 19* 22   BUN 26* 23* 20   CREATININE 0.99 0.96 1.09   CALCIUM 7.9* 8.1* 8.3*     Recent Labs      08/02/20  0358 08/03/20  0408 08/04/20  0327   ALTSGPT 27 31  --    ASTSGOT 115* 132*  --    ALKPHOSPHAT 26* 28*  --    TBILIRUBIN 0.4 0.5  --    GLUCOSE 152* 130* 127*     Recent Labs     08/02/20  0358 08/03/20  0408 08/04/20  0327   WBC 13.6* 10.1 11.3*   NEUTSPOLYS 73.30* 72.60* 67.70   LYMPHOCYTES 16.30* 14.00* 17.80*   MONOCYTES 8.40 9.40 10.40   EOSINOPHILS 1.00 3.20 3.30   BASOPHILS 0.40 0.30 0.30   ASTSGOT 115* 132*  --    ALTSGPT 27 31  --    ALKPHOSPHAT 26* 28*  --    TBILIRUBIN 0.4 0.5  --      Recent Labs     08/02/20  0358  08/03/20  0408 08/03/20  0625 08/03/20  1402 08/03/20  2248 08/04/20  0327   RBC 2.31*  --  2.42*  --   --   --  2.23*   HEMOGLOBIN 7.3*  --  7.7*  --   --   --  7.2*   HEMATOCRIT 22.2*  --  23.0*  --   --   --  21.7*   PLATELETCT 78*  --  90*  --   --   --  111*   APTT  --    < >  --  114.9* 85.3* 81.8*  --     < > = values in this interval not displayed.       Imaging  X-Ray:  I have personally reviewed the images and compared with prior images.    Assessment/Plan  * Acute ST elevation myocardial infarction (STEMI) involving left anterior descending (LAD) coronary artery (HCC)- (present on admission)  Assessment & Plan  Emergent left heart cath with PCI/stents to LAD  Complicated by cardiogenic shock, brief VF arrest with CPR x2-3 minutes  Ticagrelor, aspirin, statin, BB as tolerated  Impella discontinued 8/1    Ischemia of left lower extremity  Assessment & Plan  Cool clammy left leg with some faint mottling extending up to the thigh  No dopplerable DP/TP and popliteal pulses +/- femoral   Continue frequent pulses check. Overall stable in the past 48 hours.   Currently no plan for surgical intervention.   Plan for IR thrombectomy today.     Cardiogenic shock (HCC)- (present on admission)  Assessment & Plan  Appears cardiogenic shock has resolved.   Impella removed 8/1  Pt had some episodes of hypotension overnight, but off today.   CI remains in 2s.   Can discontinue PA cath.  Will remove right femoral line and change to PICC line.       Acute respiratory failure with hypoxia (HCC)  Assessment & Plan  Secondary to STEMI with acute pulmonary edema, intubated PTA 7/30  8/3 s/p ET tube exchange to 8.0    Plan:   Daily SAT, RASS 0  Daily SBT, doing well. Plan to extubate after IR procedure.   Continue diuresis  Mobility with PT/OT, goal level 2   Volume offload when clinically tolerated and appropriate    Acute kidney injury (HCC)  Assessment & Plan  Secondary cardiogenic shock  Renally dose medications minimize nephrotoxic substances  monitor urine output, strict  Improving    ASCVD (arteriosclerotic cardiovascular disease)- (present on admission)  Assessment & Plan  With history of prior CABG       VTE:  Heparin  Ulcer: H2 Antagonist  Lines: Central Line  Ongoing indication addressed    I have performed a physical exam and reviewed and updated ROS and Plan today (8/4/2020). In review of yesterday's note (8/3/2020), there are no changes except as documented above.     Discussed patient condition and risk of morbidity and/or mortality with RN, RT, Pharmacy and cardiology     The patient remains critically ill.  Critical care time = 65 minutes in directly providing and coordinating critical care and extensive data review.  No time overlap and excludes procedures.    Addendum  Pt came back from IR in late afternoon. Dr. Coppola attempted US guided access of right and left CFA. Visualization of right CFA was very poor. No flash or blood return in left CFA c/w occlusion. Procedure was aborted.     After he came back, resp status remains good.   Pt is subsequently extubated.     Chris Ingram D.O.

## 2020-08-04 NOTE — PROGRESS NOTES
MD order and indication verified. Rectal tone assessed.  Balloon inflated with 30 mL of water and patency assessed. BMS then flushed with 30 mL of water. Stool return present. Bedside RN educated regarding flushing BMS Qshift and need for BMS supersuser or wound care RN for balloon adjustments. Rectal tube order set in place.

## 2020-08-04 NOTE — CARE PLAN
Problem: Bowel/Gastric:  Goal: Normal bowel function is maintained or improved  Intervention: Educate patient and significant other/support system about diet, fluid intake, medications and activity to promote bowel function  8/4/2020 1043 by Ana Chávez R.N.  Note: Pt having loose stools. BMS in place       Problem: Pain Management  Goal: Pain level will decrease to patient's comfort goal  Intervention: Follow pain managment plan developed in collaboration with patient and Interdisciplinary Team  Note: Pt on continuous fentanyl for leg pain. Plan to add oral medication to wean off IV to help with extubation

## 2020-08-04 NOTE — PROCEDURES
Intubation    Date/Time: 8/3/2020 5:38 PM  Performed by: Chris Ingram D.O.  Authorized by: Chris Ingram D.O.     Consent:     Consent obtained:  Written    Consent given by:  Guardian    Risks discussed:  Aspiration, brain injury, death, bleeding, dental trauma, hypoxia, laryngeal injury and pneumothorax    Alternatives discussed:  Alternative treatment  Universal protocol:     Site/side marked: yes      Immediately prior to procedure, a time out was called: yes    Pre-procedure details:     Patient status:  Unresponsive    Mallampati score:  II    Pretreatment medications:  Fentanyl    Paralytics:  Succinylcholine (etomidate 15mg, and succinycholine 60mg)  Procedure details:     Preoxygenation: pt is intubated, on the vent.    CPR in progress: no      Intubation method:  Oral    Oral intubation technique:  Video-assisted    Laryngoscope type:  GlideScope    Laryngoscope blade:  Mac 3    Cormack-Lehane Classification:  Grade 2a    Tube size (mm):  8.0    Tube type:  Cuffed    Number of attempts:  1 (Old tube was removed then new tube was reinserted)    Ventilation between attempts: no      Cricoid pressure: no      Tube visualized through cords: yes    Placement assessment:     ETT to lip:  25cm    Tube secured with:  Adhesive tape and ETT chilel    Breath sounds:  Equal    Placement verification: chest rise, condensation, ETCO2 detector and fiberoptic scope      CXR findings:  ETT in proper place  Post-procedure details:     Patient tolerance of procedure:  Tolerated well, no immediate complications  Comments:      Patient had large amount projectile vomiting this morning and throughout the day has increasing amount of trach secretions. In addition, pt does have cuff leak and we had to increase the cuff pressure to 41mmHg to avoid the leak. Any coughing would cause significant amount of leak. For this reason, we decided to upsize the tube to #8.0.     Old tube was removed, and new tube was reinserted

## 2020-08-04 NOTE — ANESTHESIA QCDR
2019 [facility] Clinical Data Registry (for Quality Improvement)     Postoperative nausea/vomiting risk protocol (Adult = 18 yrs and Pediatric 3-17 yrs)- (430 and 463)  General inhalation anesthetic (NOT TIVA) with PONV risk factors: No  Provision of anti-emetic therapy with at least 2 different classes of agents: N/A  Patient DID NOT receive anti-emetic therapy and reason is documented in Medical Record: N/A    Multimodal Pain Management- (477)  Non-emergent surgery AND patient age >= 18: No  Use of Multimodal Pain Management, two or more drugs and/or interventions, NOT including systemic opioids:   Exception: Documented allergy to multiple classes of analgesics:     Smoking Abstinence (404)  Patient is current smoker (cigarette, pipe, e-cig, marijuanna): No  Elective Surgery:   Abstinence instructions provided prior to day of surgery:   Patient abstained from smoking on day of surgery:     Pre-Op Beta-Blocker in Isolated CABG (44)  Isolated CABG AND patient age >= 18: No  Beta-blocker admin within 24 hours of surgical incision:   Exception:of medical reason(s) for not administering beta blocker within 24 hours prior to surgical incision (e.g., not  indicated,other medical reason):     PACU assessment of acute postoperative pain prior to Anesthesia Care End- Applies to Patients Age = 18- (ABG7)  Initial PACU pain score is which of the following: < 7/10  Patient unable to report pain score: N/A    Post-anesthetic transfer of care checklist/protocol to PACU/ICU- (426 and 427)  Upon conclusion of case, patient transferred to which of the following locations: ICU  Use of transfer checklist/protocol: Yes  Exclusion: Service Performed in Patient Hospital Room (and thus did not require transfer): N/A  Unplanned admission to ICU related to anesthesia service up through end of PACU care- (MD51)  Unplanned admission to ICU (not initially anticipated at anesthesia start time): No

## 2020-08-04 NOTE — THERAPY
Missed Therapy     Patient Name: Humberto Shannon  Age:  60 y.o., Sex:  male  Medical Record #: [id number]  Today's Date: 8/4/2020    Attempted PT evaluation. RN reports pt recently BTB. Reports tolerated sitting EOB better today. Pt to IR this PM with plans for extubation after. Will re-attempt tomorrow as able/appropriate.

## 2020-08-04 NOTE — THERAPY
Missed Therapy     Patient Name: Humberto Shannon  Age:  60 y.o., Sex:  male  Medical Record #: [id number]  Today's Date: 8/4/2020    Discussed missed therapy with RN       08/04/20 1235   Interdisciplinary Plan of Care Collaboration   Collaboration Comments Patient to go to IR at 3PM, hopeful extubation to follow. Will hold eval for tomorrow.

## 2020-08-04 NOTE — WOUND TEAM
Received notification super-user placed BMS. Appropriate orders in place. Opened LDA for rectal tube.

## 2020-08-04 NOTE — ANESTHESIA POSTPROCEDURE EVALUATION
Patient: Humberto Shannon    Procedure Summary     Date:  08/04/20 Room / Location:  [facility] IMAGING - INTERVENTIONAL - [facility]    Anesthesia Start:  1523 Anesthesia Stop:  1646    Procedure:  IR-EXTREMITY ANGIOGRAM-UNILATERAL Diagnosis:  (Thrombus)    Scheduled Providers:  Viridiana Reyes M.D. Responsible Provider:  Viridiana Reyes M.D.    Anesthesia Type:  MAC ASA Status:  4          Final Anesthesia Type: MAC  Last vitals  BP   Blood Pressure: (!) 90/56, Arterial BP: (!) 92/49    Temp   36.5 °C (97.7 °F)    Pulse   Pulse: 83   Resp   18    SpO2   98 %      Anesthesia Post Evaluation    Patient location during evaluation: PACU  Patient participation: complete - patient cannot participate  Level of consciousness: awake and alert    Airway patency: patent  Anesthetic complications: no  Cardiovascular status: hemodynamically stable  Respiratory status: acceptable, ETT and ventilator  Hydration status: euvolemic    PONV: none           Nurse Pain Score: 0 (NPRS)

## 2020-08-04 NOTE — ANESTHESIA TIME REPORT
Anesthesia Start and Stop Event Times     Date Time Event    8/4/2020 1523 Ready for Procedure     1523 Anesthesia Start     1646 Anesthesia Stop        Responsible Staff  08/04/20    Name Role Begin End    Viridiana Reyes M.D. Anesth 1523 1646        Preop Diagnosis (Free Text):  Pre-op Diagnosis             Preop Diagnosis (Codes):    Post op Diagnosis  Femoral artery occlusion (HCC)      Premium Reason  A. 3PM - 7AM    Comments:

## 2020-08-04 NOTE — PROGRESS NOTES
Spoke to Era in IR regarding PICC line ordered for this patient.  Consent for this procedure is already obtained and in the chart and the PICC line will be placed while the patient is in IR. Triple lumen PICC line delivered to IR 1.

## 2020-08-04 NOTE — PROCEDURES
BRONCHOSCOPY PROCEDURE NOTE      Date: 8/3/2020  Time: 5:46 PM    Time out: performed. Name, MRN, allergy and procedure were confirmed.     Indication: aspiration pneumonitis/pneumonia, acute hypoxic resp failure    Consent: Informed consent obtained from designated decision maker after explaining the benefits/risks of the procedure including but not limited to bleeding, infection, airway trauma or loss thereof, pneumothorax/hemothorax, arrythmia, or death. Patient or surrogate expressed understanding and agreement and signed consent which can be found in the patient's chart.    Procedure: Bronchoscopy with therapeutic aspiration of secretions    Sedation: propofol gtt, fentanyl 100mcg IV x1    Findings:  Respiratory therapy and nursing at bedside throughout procedure. Patient provided sedation and analgesia throughout the procedure. Placed on full ventilator support with an FiO2 of 100% during procedure. Using a fiberoptic bronchoscope, trachea entered via ET tube. Placement of new tube was confirmed. We pulled the tube about 2cm.  Airways visualized directly and careful inspection of airway was accomplished.     Upper airway - Not visualized as bronchoscope passed through ETT.  Trachea to ebonie - normal appearing mucosa without lesions or mass, ETT tip measured 2 cm from the ebonie.  Right main bronchus, segmental and subsegmental airways: normal appearing mucosa without mass/lesion/anatomic variance, secretions: frothy clear secretions noted mostly in RUL, RLL and some amount in RML  Left main bronchus, segmental and subsegmental airways: normal appearing mucosa without mass/lesion/anatomic variance, secretions: frothy clear secretions noted in LLL, minimally in lingula  All secretions were suctioned and cleared.     Specimen sent to microbiology/pathology: none    Complications:   Patient tolerated procedure well without any difficulties and left in care of bedside nurse/RT.     Estimated blood loss:  none    Bronchoscopy was performed after intubation. No moderate sedation needed. Pt was started on propofol gtt immediately after intubation     Chris Ingram D.O.  Critical Care Medicine

## 2020-08-04 NOTE — PROGRESS NOTES
IR Nursing Note:    Patient underwent an aborted left lower extremity angiogram by Dr. Coppola anesthesia by Dr. Reyes.  Procedure was aborted as wire was unable to advance due to thrombosed artery.  Right and left arteries were attempted. Procedure site was marked by MD and verified using imaging guidance.  Patient was placed in a supine position.   A Tegaderm and gauze dressing was placed over surgical site. Report called to Ana NORIEGA. Pt transported by bed with RN and RT to T627.

## 2020-08-04 NOTE — CARE PLAN
Ventilator Daily Summary    Vent Day # 6    Ventilator settings changed this shift: not at this time    Weaning trials: yes     Respiratory Procedures: not at this time    Plan: Continue current ventilator settings and wean mechanical ventilation as tolerated per physician orders.

## 2020-08-04 NOTE — ANESTHESIA PREPROCEDURE EVALUATION
Relevant Problems   CARDIAC   (+) ASCVD (arteriosclerotic cardiovascular disease)   (+) Acute ST elevation myocardial infarction (STEMI) involving left anterior descending (LAD) coronary artery ([facility])   (+) Coronary artery disease involving native coronary artery of native heart without angina pectoris   (+) Coronary artery disease involving native coronary artery with unstable angina pectoris ([facility])   (+) Critical lower limb ischemia   (+) Essential hypertension, benign   (+) Ischemia of left lower extremity   (+) NSTEMI (non-ST elevated myocardial infarction) ([facility])   (+) S/P CABG x 3 2008         (+) Acute kidney injury ([facility])       Physical Exam    Airway - unable to assess  TM distance: >3 FB  Patient is intubated/trached     Cardiovascular - normal exam  Rhythm: regular  Rate: normal  (-) murmur     Dental - normal exam           Pulmonary - normal exam  Breath sounds clear to auscultation     Abdominal    Neurological - normal exam         Other findings: Alert, answering questions            Anesthesia Plan    ASA 4   ASA physical status 4 criteria: severe reduction of ejection fractions, CAD/stents - recent (< 3 months), MI - recent (< 3 months) and respiratory failure    Plan - MAC             Induction: intravenous      Pertinent diagnostic labs and testing reviewed    Informed Consent:    Anesthetic plan and risks discussed with patient (consent from son).

## 2020-08-04 NOTE — PROGRESS NOTES
Progress Note    CC: absent pulses in LLE    Interval History: 60 y.o. male who presented 7/30/2020 with STEMI and acute heart failure w/ EF 25-30% taken emergently for impella, cath/PCI, has residual untreated RCA dz. Had vfib arrest shortly after stenting. Remains intubated.      Unknown but apparent hx of vascular disease. He developed a cold LLE after impella was placed. This was managed by cardiology team with an external R/L fem-fem circuit. After removal of the impella and fem/fem, there were no appreciable pulses in the feet.    Pt is awake enough today to communicate the L foot is painful.    Review of Systems  Negative for chest pain or SOB    Medications  Prior to Admission Medications   Prescriptions Last Dose Informant Patient Reported? Taking?   aspirin EC 81 MG EC tablet UNK at [facility] Patient's Home Pharmacy No No   Sig: Take 1 Tab by mouth every day.   atorvastatin (LIPITOR) 80 MG tablet UNK at [facility] Patient's Home Pharmacy Yes No   Sig: Take 80 mg by mouth every day.   baclofen (LIORESAL) 10 MG Tab UNK at [facility] Patient's Home Pharmacy Yes No   Sig: take 1 tablet by mouth four times a day with food or milk   celecoxib (CELEBREX) 200 MG Cap UNK at [facility] Patient's Home Pharmacy Yes No   Sig: Take 200 mg by mouth 2 times a day.   clopidogrel (PLAVIX) 75 MG Tab UNK at [facility] Patient's Home Pharmacy Yes No   Sig: Take 75 mg by mouth every day.   cyclobenzaprine (FLEXERIL) 10 MG Tab UNK at [facility] Patient's Home Pharmacy Yes No   Sig: take 1 tablet by mouth three times a day if needed for SPASMS   esomeprazole (NEXIUM) 20 MG capsule UNK at [facility] Patient's Home Pharmacy Yes No   Sig: Take 20 mg by mouth every morning before breakfast.   ibuprofen (MOTRIN) 800 MG Tab UNK at [facility] Patient's Home Pharmacy Yes No   Sig: Take 800 mg by mouth every 8 hours as needed.   isosorbide mononitrate SR (IMDUR) 30 MG TABLET SR 24 HR UNK at [facility] Patient's Home Pharmacy Yes No   Sig: Take 30 mg by mouth every morning.   metoprolol SR  (TOPROL XL) 25 MG TABLET SR 24 HR UNK at [facility] Patient's Home Pharmacy No No   Sig: Take 1 Tab by mouth every day.   nitroGLYCERIN (NITROSTAT) 0.3 MG SL tablet UNK at [facility] Patient's Home Pharmacy No No   Sig: Place 1 Tab under tongue as needed for Chest Pain.      Facility-Administered Medications: None         Physical Exam  Vitals:    08/04/20 1400   BP:    Pulse: 87   Resp: 14   Temp:    SpO2: 100%       Pulse/Extremity Exam:    Femorals:        Right monphasic       Left monophasic     Popliteal       Right absent       Left absent    Pedal Pulses:       Right DP absent       Right PT monophasic       Left DP absent       Left PT absent    Wounds: L foot/lower leg cool, early mottling.       General appearance: intubated, ill-appearing  Neuro: not alert/oriented  Lungs: No inspiratory stridor or wheezing  CV: tachy  Abdomen: Soft, nondistended  Skin: No rashes, wounds. Cold from the left knee distally. Left thigh is warm. Blanched skin on lower L leg but mottling not appreciated.       Labs reviewed today:  Recent Labs     08/02/20 0358 08/03/20 0408 08/04/20 0327   WBC 13.6* 10.1 11.3*   RBC 2.31* 2.42* 2.23*   HEMOGLOBIN 7.3* 7.7* 7.2*   HEMATOCRIT 22.2* 23.0* 21.7*   MCV 96.1 95.0 97.3   MCH 31.6 31.8 32.3   MCHC 32.9* 33.5* 33.2*   RDW 52.9* 51.5* 52.3*   PLATELETCT 78* 90* 111*   MPV 11.0 11.9 11.9     Recent Labs     08/02/20 0358 08/03/20 0408 08/04/20 0327 08/04/20  1132   SODIUM 137 140 142  --    POTASSIUM 4.2 3.5* 3.0* 3.3*   CHLORIDE 110 107 105  --    CO2 14* 19* 22  --    GLUCOSE 152* 130* 127*  --    BUN 26* 23* 20  --    CREATININE 0.99 0.96 1.09  --    CALCIUM 7.9* 8.1* 8.3*  --      Recent Labs     08/02/20 0358 08/03/20  0408 08/04/20 0327   ALTSGPT 27 31  --    ASTSGOT 115* 132*  --    ALKPHOSPHAT 26* 28*  --    TBILIRUBIN 0.4 0.5  --    GLUCOSE 152* 130* 127*     Recent Labs     08/03/20  0625 08/03/20  1402 08/03/20  224   APTT 114.9* 85.3* 81.8*         Recent Labs     08/04/20 0327    TRIGLYCERIDE 125     No results for input(s): TROPONINI in the last 72 hours.    Urinalysis:    No results found       Assessment/Plan & Medical Decision-Making:  The patient has left lower extremity critical limb ischemia. He is s/p NSTEMI/Cardiogenic shock s/p PCI/LAD stenting, impella, arrest, and has untreated RCA dz.     L Mid/distal SFA is occluded. There was weak reconstitution in the pop and patent tibials on duplex. No images of the foot were saved, but note was made there was no flow in the foot.     He is a poor operative candidate for a major revasc procedure and general anesthesia given his overall condition. We will try to revasc endovascularly.    I had a long discussion with the patient's son that the leg may not be salvageable at this point, regardless of whether revasc is achieved. There is risk for compartment syndrome, ischemic neuropathy, chronic pain and ultimately a major amputation. He understands and elected to proceed on his father's behalf.    Thank you for involving us in this patient's care. Please call with questions.    Leonel Coppola MD  Vascular Surgery   [facility]  Office: [phone]

## 2020-08-04 NOTE — PROGRESS NOTES
Progress Note    I discussed the case with Dr. Moss today. The patient was ambulatory prior to this admission. He is too ill for a major revasc attempt at this point but we feel it is reasonable to try an endo revasc. He is not a candidate for lysis but thrombectomy may be feasible.     Since that discussion, he had issues with vomiting this evening.Had the ET tube exchanged and upsized. I discussed these issues with Dr. Ingram and the pt is still hemodynamically stable, off pressers and oxygenating on minimal vent settings at the present time. Should be ok for procedure tomorrow.    Pt is scheduled for LLE angio arlen 1500 w/ anesthesia assistance for airway/vent management.     Leoenl Coppola MD  Vascular Surgeon  [facility]  Office: [phone]

## 2020-08-04 NOTE — ASSESSMENT & PLAN NOTE
Likely preexisting PAD prior to hospitalization but not clear to what extent and this was likely exacerbated when impella was placed in left femoral.   Cool clammy left leg with coalescing mottling extending up to the thigh   No dopplerable on left DP/TP and popliteal pulses +/- femoral   8/4 s/p attempted to access left and right CFA. No flash or blood return in left CFA c/w occlusion.   8/4 new mild mottling in the distal foot.     Plan:   Plan for vascular surgery today   NPO  Continue frequent pulses check. Not able to doppler in DP, TP, pop in left leg. Dopplerable pulse on TP and pop on right

## 2020-08-04 NOTE — PALLIATIVE CARE
Palliative Care follow-up  Received update that pts son, Attila, called and requested call back.   Called and spoke with Attila who stated that he had attempted to ask [facility] in [facility] if they had an AD on file for his father and was told that the staff at [facility] needed to request this.     Called [facility] at [phone] and had to leave a message with the request for possible AD for the pt, contact number provided with request for call back.     Updated: Called Attila back and left a message that I was unable to speak with anyone directly but left a message with their Medical Records department with the request for an AD if filed there.     Plan: TBD as pts admissions progresses.     Thank you for allowing Palliative Care to support this patient and family. Contact [phone] for additional assistance, change in patient status, or with any questions/concerns.

## 2020-08-05 PROBLEM — F10.931 ALCOHOL WITHDRAWAL DELIRIUM (HCC): Status: ACTIVE | Noted: 2020-01-01

## 2020-08-05 PROBLEM — D64.9 ANEMIA: Status: ACTIVE | Noted: 2020-01-01

## 2020-08-05 NOTE — PROCEDURES
Vascular Access Team     Date of Insertion: 8/4/2020  Arm Circumference: 30  Internal length: 44  External Length: 0  Vein Occupancy %: 35   Reason for PICC: access, critical care  Labs: WBC 11.3, , BUN 20, Cr 1.09, GFR >60, INR 1.12     Consents confirmed, vessel patency confirmed with ultrasound. Risks and benefits of procedure explained to patient and education regarding central line associated bloodstream infections provided. Questions answered.      PICC placed in RUE per licensed provider order with ultrasound guidance.  5 Fr, 2 lumen PICC placed in brachial vein after 1 attempt(s). 2 mL of 1% lidocaine injected intradermally, 21 gauge microintroducer needle and modified Seldinger technique used. 44 cm catheter inserted with good blood return. Secured at 0 cm marker. Each lumen flushed without resistance with 10 mL 0.9% normal saline. PICC line secured with Biopatch and Tegaderm.     PICC tip placement location is confirmed by nurse to be in the Superior Vena Cava (SVC) utilizing 3CG technology. PICC line is appropriate for use at this time. Patient tolerated procedure well, without complications.  Patient condition relayed to unit RN or ordering physician via this post procedure note in the EMR.      Ultrasound images uploaded to PACS and viewable in the EMR - yes  Ultrasound imaged printed and placed in paper chart - no     BARD Power PICC ref # [id number], Lot # [id number], Expiration Date 4/30/2021

## 2020-08-05 NOTE — PROCEDURES
Date: 8/5/2020    Procedure:  Emergent bronchoscopy    Indication: Respiratory failure, ? pneumonia    Physician:  Neo Mayers M.D.    Consent:  Emergent     Procedure:  This patient has developed increasing respiratory failure, and required emergent intubation.  Bronchoscopy was indicated for airway evaluation and to evaluate for pneumonia.  A flexible FOB was inserted through the ETT without difficulty.  All airways were evaluated to the sub-segmental level.  The airway mucosa was significantly inflamed, moderate amount of bilious secretions in bilateral dependent regions which were irrigated and aspirated.  No endobronchial lesions were seen.  No immediate complications.  EBL = 0.

## 2020-08-05 NOTE — PROCEDURES
Date: 8/5/2020      Procedure:  Emergent orotracheal intubation    Indication: Respiratory failure    Physician:  Neo Mayers M.D.    Consent:  Emergent     Procedure:  This patient has developed increasing respiratory failure, not candidate for BiPAP therapy and requires emergent intubation.  RSI given with etomidate and rocuronium.  Using a #4 glidescope, a 8.0 ETT was placed on the first attempt w/o complication.  Tube placement confirmed by direct visualization of placement, end-tidal CO2 monitor color change and equal breath sounds.  No immediate complications.  Vitals remained stable throughout the procedure.  A post-procedure CXR will be reviewed.

## 2020-08-05 NOTE — CARE PLAN
Problem: Ventilation Defect:  Goal: Ability to achieve and maintain unassisted ventilation or tolerate decreased levels of ventilator support  Outcome: PROGRESSING SLOWER THAN EXPECTED      Ventilator Daily Summary    Vent Day #7    Ventilator settings changed this shift: 18, 440, +8, 40%    Weaning trials: na, recently intubated.  MD Mayers requests wait on spont.    Respiratory Procedures: Intubated and bronched    Plan: Continue current ventilator settings and wean mechanical ventilation as tolerated per physician orders.

## 2020-08-05 NOTE — PROGRESS NOTES
Reason for consultation /admission : STEMI/cadiogenic shock    Back on norepinephrine after had to be reintubated  Attempted salvage procedure of the left lower extremity yesterday by vascular surgery was unsuccessful  Still on IV heparin  Monitor reviewed and showed no significant arrhythmia    Review of systems; intubated/sedated, unable to perform      Temp:  [36.5 °C (97.7 °F)-38.1 °C (100.6 °F)] 37.8 °C (100 °F)  Pulse:  [] 87  Resp:  [13-49] 20  BP: ()/() 86/59  SpO2:  [82 %-100 %] 100 %  GENERAL not in acute distress, not dyspnic at rest  Head atraumatic, normocephalic  ENT neck supple, no JVD, no carotid bruits or thyromegaly  Lung good expansion, distant sound, no rales or wheezing  Heart RRR, normal rate, no murmur, no gallop or rub  Absent dorsalis pedis pulse with purplish discoloration distal right foot  Cool to touch left lower extremity below the knee and absent pedal pulsess  Abd soft, no mass or bruits  Ext no edema  Skin + ecchymosis, no petechiae  Musculoskeletal no deformity  Neuro sedated, arousable    Monitor reviewed by me showed no significant ventricular or atrial dysrhythmias.    Labs: Cr 1.18, K+ 3.2  Hb 6.8    Assessment and plans    1.  Status post LAD stent for anterior MI with residual right coronary artery left circumflex stenosis  As above back on low dose norepinephrine  Will likely need AKA of the left lower extremity  May need to revascularize RCA prior to that but would be high risk  Continue DAPT and statin  Some of the hypotension could be related to blood loss.  Agree with transfusion  From cardiac standpoint given continued decrease in hemoglobin, would discontinue full dose heparin     2.  Limb ischemia Lt>Rt  Per vascular surgery    3.  NSVT  No sig recurrence, K+ improved but still slightly low  Will continue to closely monitor and replace K+      4. Respiratory failure probably with combination of heart failure and respiratory issue  Continue careful  diuresis as BP allows.  Further management per primary/intensivist     5.  Anemia.  Acute blood loss  Agree with transfusion   May d/c IV heparin as mentioned    6.  Hypokalemia likely diuretic induced +/- vomiting , improving  being replaced     Overall prognosis is guarded  Agree with palliative care    Will follow    Please note that this dictation was created using voice recognition software. I have worked with consultants from the vendor as well as technical experts from [facility] to optimize the interface. I have made every reasonable attempt to correct obvious errors, but I expect that there are errors of grammar and possibly content I did not discover before finalizing the note

## 2020-08-05 NOTE — PROGRESS NOTES
Progress Note    CC: absent pulses in LLE    Interval History: 60 y.o. male who presented 7/30/2020 with STEMI and acute heart failure w/ EF 25-30% taken emergently for impella, cath/PCI, has residual untreated RCA dz. Had vfib arrest shortly after stenting. Remains intubated.      Unknown but apparent hx of vascular disease. He developed a cold LLE after impella was placed. This was managed by cardiology team with an external R/L fem-fem circuit. After removal of the impella and fem/fem, there were no appreciable pulses in the feet.    Unsuccessful thrombectomy of the L CFA.  Decline last night re intubated on Levo    Review of Systems  Negative for chest pain or SOB    Medications  Prior to Admission Medications   Prescriptions Last Dose Informant Patient Reported? Taking?   aspirin EC 81 MG EC tablet UNK at [facility] Patient's Home Pharmacy No No   Sig: Take 1 Tab by mouth every day.   atorvastatin (LIPITOR) 80 MG tablet UNK at [facility] Patient's Home Pharmacy Yes No   Sig: Take 80 mg by mouth every day.   baclofen (LIORESAL) 10 MG Tab UNK at [facility] Patient's Home Pharmacy Yes No   Sig: take 1 tablet by mouth four times a day with food or milk   celecoxib (CELEBREX) 200 MG Cap UNK at [facility] Patient's Home Pharmacy Yes No   Sig: Take 200 mg by mouth 2 times a day.   clopidogrel (PLAVIX) 75 MG Tab UNK at [facility] Patient's Home Pharmacy Yes No   Sig: Take 75 mg by mouth every day.   cyclobenzaprine (FLEXERIL) 10 MG Tab UNK at [facility] Patient's Home Pharmacy Yes No   Sig: take 1 tablet by mouth three times a day if needed for SPASMS   esomeprazole (NEXIUM) 20 MG capsule UNK at [facility] Patient's Home Pharmacy Yes No   Sig: Take 20 mg by mouth every morning before breakfast.   ibuprofen (MOTRIN) 800 MG Tab UNK at [facility] Patient's Home Pharmacy Yes No   Sig: Take 800 mg by mouth every 8 hours as needed.   isosorbide mononitrate SR (IMDUR) 30 MG TABLET SR 24 HR UNK at [facility] Patient's Home Pharmacy Yes No   Sig: Take 30 mg by mouth every morning.      metoprolol SR (TOPROL XL) 25 MG TABLET SR 24 HR UNK at [facility] Patient's Home Pharmacy No No   Sig: Take 1 Tab by mouth every day.   nitroGLYCERIN (NITROSTAT) 0.3 MG SL tablet UNK at [facility] Patient's Home Pharmacy No No   Sig: Place 1 Tab under tongue as needed for Chest Pain.      Facility-Administered Medications: None         Physical Exam  Vitals:    08/05/20 0714   BP:    Pulse: 86   Resp: (!) 21   Temp:    SpO2: 100%       Pulse/Extremity Exam:    Femorals:        Right monphasic       Left monophasic     Popliteal       Right absent       Left absent    Pedal Pulses:       Right DP absent       Right PT monophasic       Left DP absent       Left PT absent    Wounds: L foot/lower leg cool, early mottling.     General appearance: intubated, ill-appearing  Abdomen: Soft, nondistended  Skin: No rashes, wounds. Cold from the left knee distally. Left thigh is warm. Blanched skin on lower L leg but mottling not appreciated. R foot mottled and cool.      Labs reviewed today:  Recent Labs     08/03/20 0408 08/04/20 0327 08/05/20  0516   WBC 10.1 11.3* 14.7*   RBC 2.42* 2.23* 2.14*   HEMOGLOBIN 7.7* 7.2* 6.8*   HEMATOCRIT 23.0* 21.7* 21.8*   MCV 95.0 97.3 101.9*   MCH 31.8 32.3 31.8   MCHC 33.5* 33.2* 31.2*   RDW 51.5* 52.3* 54.3*   PLATELETCT 90* 111* 136*   MPV 11.9 11.9 12.5     Recent Labs     08/03/20 0408 08/04/20 0327 08/04/20  1132 08/05/20  0516   SODIUM 140 142  --  144   POTASSIUM 3.5* 3.0* 3.3* 3.2*   CHLORIDE 107 105  --  106   CO2 19* 22  --  21   GLUCOSE 130* 127*  --  147*   BUN 23* 20  --  23*   CREATININE 0.96 1.09  --  1.18   CALCIUM 8.1* 8.3*  --  8.2*     Recent Labs     08/03/20 0408 08/04/20 0327 08/05/20  0516   ALTSGPT 31  --   --    ASTSGOT 132*  --   --    ALKPHOSPHAT 28*  --   --    TBILIRUBIN 0.5  --   --    GLUCOSE 130* 127* 147*     Recent Labs     08/03/20  1402 08/03/20  2248 08/04/20  2347   APTT 85.3* 81.8* 48.5*         Recent Labs     08/04/20  0327   TRIGLYCERIDE 125     No  results for input(s): TROPONINI in the last 72 hours.    Urinalysis:    No results found       Assessment/Plan & Medical Decision-Making:  The patient has left lower extremity critical limb ischemia. He is s/p NSTEMI/Cardiogenic shock s/p PCI/LAD stenting, impella, arrest, and has untreated RCA dz.     L Mid/distal SFA is occluded. There was weak reconstitution in the pop and patent tibials on duplex. No images of the foot were saved, but note was made there was no flow in the foot.     He is a poor operative candidate for a major revasc procedure and general anesthesia given his overall condition.     Attempt to revasc endovascularly was not successful.  He will need a fem/profunda thrombectomy and AKA unless palliative options are pursued.    Thank you for involving us in this patient's care. Please call with questions.    Reta Roman PA-C for [facility]  Vascular Surgery   [facility]  Office: [phone]    Attending Addendum  Pt seen/examined this evening. Attempted percutaneous intervention for revasc was unsuccessful. The L CFA has thrombosed. LLE frankly ischemic. Pt remains critically ill, on pressers, reintubated but he is awake and mentating.    I had a long discussion with the patient's two sons this evening regarding options. The leg is not salvageable and if they are leaning towards doing everything possible, the pt will need an AKA. With his current vascular status, he will not heal anything without a thrombectomy as well. Without surgery, the leg will add to his systemic illness.     With his tenuous status over the past few days, they were contemplating comfort care but have made a decision, in conjunction with the patient who is alert and cognizant enough to understand the situation, that they want everything done to try and save their father. I explained that any operation for him is extremely high risk and there is a real chance he will not survive. They all understand and would like to  proceed. The patient himself expressed that if he were to arrest, he does NOT want CPR at any time, including in the operating room.    Leonel Coppola MD

## 2020-08-05 NOTE — PROGRESS NOTES
Pt transported back to [facility] with RN and RT. Pt extubated upon arrival per Dr. Ingram's order. Pt son at bedside and update on plan of care

## 2020-08-05 NOTE — PROGRESS NOTES
Slight leak on BMS, removed 32ml of fluid from balloon.  Balloon inflated with 35 mL of water and patency assessed. BMS then flushed with 60mL of water. Stool return present. Bedside RN educated regarding flushing BMS Qshift and need for BMS supersuser or wound care RN for balloon adjustments. Rectal tube order set in place.

## 2020-08-05 NOTE — PROGRESS NOTES
Critical Care Progress Note    Date of admission  7/30/2020    Chief Complaint  60 y.o. male admitted 7/30/2020 with anterior STEMI, status post LAD stenting, cardiogenic shock, respiratory failure    Hospital Course    60 y.o. male who presented 7/30/2020 in transfer from [facility] in [facility] with subtle ST elevation anterior MI.  He has a history of prior CABG, vascular disease and ongoing smoking by report.  He apparently presented earlier in the day with left arm pain and dyspnea with rapid deterioration in his condition requiring intubation and transfer to [facility] for further care.  He was transferred on a nitroglycerin drip to treat significant hypertension initially.  Echocardiogram showed significant drop in ejection fraction to 25-30% with pulmonary edema.  He was taken urgently to the Cath Lab where he underwent successful PCI/stents to the LAD.  He had a brief episode of V. fib arrest requiring 2-3 minutes of CPR and 1 mg of epinephrine.  A left femoral Impella ventricular assist device was placed and temporary right to left external femoral femoral bypass was placed due to low flow distal to the Impella catheter placement with significant underlying iliac and femoral disease noted with multiple collaterals by report.  Kennedy-Josiah catheter is in place.  He is currently intubated on full ventilator support, sedated and unresponsive.  Hemodynamics reviewed in detail and direct handoff performed with cardiology at bedside.  8/2 and 8/3 off inotropes.   8/3 ET tube exchange to 8.0 due to vomiting and had to put high cuff pressure on ET tube size 7.5  8/4 extubated  8/4 Attempted access of left femoral artery, but unable.  8/5 reintubated. Pt was tachycardic, hallucinating and vomited, presumed alcohol withdrawal. NG tube in place with immediate return of 500cc green bile vomit        Interval Problem Update  Reviewed last 24 hour events:  Extubated yesterday,  but reintubated due to vomiting, tachycardic hallucinating and presumed due to severe alcohol withdrawal  NG tube was inserted with immediate return of 500cc green bile fluid.   On precedex for sedation. RASS -4  Fentanyl IV pushes prn pain contol  Febrile with Tm 38.1  HR in 80-90s currently   SBP 90-100s, with MAP >71  ABG 7.5/27/194  Hgb 6.8 this am  Platelets 136 (from 111)  Creatinine 1.18 this am (from 1.09)  Negative 1L fluid balance in the past 24 hours, even since admission      Review of Systems  Review of Systems   Unable to perform ROS: Acuity of condition        Vital Signs for last 24 hours   Temp:  [36.5 °C (97.7 °F)-38.1 °C (100.6 °F)] 38.1 °C (100.6 °F)  Pulse:  [] 87  Resp:  [13-49] 20  BP: ()/() 88/56  SpO2:  [82 %-100 %] 100 %    Hemodynamic parameters for last 24 hours  CVP:  [7 MM HG] 7 MM HG  PCWP:  [23 MM HG] 23 MM HG  CO:  [5.4] 5.4  CI:  [2.7] 2.7    Respiratory Information for the last 24 hours  Vent Mode: APVCMV  Rate (breaths/min): 18  Vt Target (mL): 440  PEEP/CPAP: 8  P Support: 5  MAP: 14  Control VTE (exp VT): 457    Physical Exam   Physical Exam  Vitals signs and nursing note reviewed.   Constitutional:       Comments: Intubated, sedated, RASS -4   HENT:      Head: Normocephalic and atraumatic.      Mouth/Throat:      Comments: ET tube in place  Eyes:      Conjunctiva/sclera: Conjunctivae normal.   Cardiovascular:      Rate and Rhythm: Normal rate and regular rhythm.      Pulses: Normal pulses.      Heart sounds: Normal heart sounds. No murmur.   Pulmonary:      Breath sounds: Rales present. No wheezing.      Comments: diminsihed breath sounds  Abdominal:      General: Bowel sounds are normal. There is distension.      Palpations: Abdomen is soft.      Tenderness: There is no abdominal tenderness. There is no guarding.   Musculoskeletal:         General: Swelling present.   Skin:     Capillary Refill: Capillary refill takes more than 3 seconds. 2-3 in right leg and  >3 in left leg     Comments: Cool to the touch on the left side  More prominent mottling in knee and lower ext and they appear coalescing, cool to touch extending up to left thigh    No dopplerable pulses noted in left DP/TP, and popliteal, +/- femoral.     Right leg: new mottling appearance noted in the right foot. Clammy to touch   Neurological:      Comments: Unable to assess limited due to intubated and sedated         Medications  Current Facility-Administered Medications   Medication Dose Route Frequency Provider Last Rate Last Dose   • dexmedetomidine (PRECEDEX) 400 mcg/100mL NS premix infusion  0.1-1.5 mcg/kg/hr Intravenous Continuous Neo Mayers M.D. 23 mL/hr at 08/05/20 0315 1 mcg/kg/hr at 08/05/20 0315   • famotidine (PEPCID) tablet 20 mg  20 mg Enteral Tube Q12HRS Neo Mayers M.D.        Or   • famotidine (PEPCID) injection 20 mg  20 mg Intravenous Q12HRS Neo Mayers M.D.   20 mg at 08/05/20 0502   • fentaNYL (SUBLIMAZE) injection 25 mcg  25 mcg Intravenous Q HOUR PRN Neo Mayers M.D.        Or   • fentaNYL (SUBLIMAZE) injection 50 mcg  50 mcg Intravenous Q HOUR PRN Neo Mayers M.D.        Or   • fentaNYL (SUBLIMAZE) injection 100 mcg  100 mcg Intravenous Q HOUR PRN Neo Mayers M.D.       • oxyCODONE immediate-release (ROXICODONE) tablet 5 mg  5 mg Enteral Tube Q4HRS PRN Chris Ingram D.O.        Or   • oxyCODONE immediate release (ROXICODONE) tablet 10 mg  10 mg Enteral Tube Q4HRS PRN Chris Ingram D.O.   10 mg at 08/04/20 1835   • morphine (pf) 4 MG/ML injection 2-4 mg  2-4 mg Intravenous Q2HRS PRN Asher Hines M.D.   4 mg at 08/05/20 0018   • LORazepam (ATIVAN) injection 2 mg  2 mg Intravenous Q2HRS Asher Hines M.D.   Stopped at 08/05/20 0400   • LORazepam (ATIVAN) injection 4 mg  4 mg Intravenous Q15 MIN PRN Asher Hines M.D.        Or   • LORazepam (ATIVAN) injection 3 mg  3 mg Intravenous Q15 MIN PRN Asher Hines M.D.        Or   • LORazepam (ATIVAN) injection 2  mg  2 mg Intravenous Q15 MIN PRN Asher Hines M.D.   2 mg at 08/04/20 2051    Or   • LORazepam (ATIVAN) injection 1 mg  1 mg Intravenous Q15 MIN PRN Asher Hines M.D.   1 mg at 08/04/20 2024   • polyethylene glycol/lytes (MIRALAX) PACKET 1 Packet  1 Packet Enteral Tube BID Neo Mayers M.D.   Stopped at 08/02/20 1800   • bisacodyl (DULCOLAX) suppository 10 mg  10 mg Rectal DAILY Neo Mayers M.D.   Stopped at 08/03/20 0600   • thiamine tablet 100 mg  100 mg Enteral Tube BID Neo Mayers M.D.   100 mg at 08/04/20 1836   • heparin injection 3,200 Units  3,200 Units Intravenous PRN Hamzah Jaquez M.D.   3,200 Units at 08/05/20 0213    And   • heparin infusion 25,000 units in 500 mL 0.45% NACL   Intravenous Continuous Hamzah Jaquez M.D. 29 mL/hr at 08/05/20 0215 1,450 Units/hr at 08/05/20 0215   • ondansetron (ZOFRAN) syringe/vial injection 4 mg  4 mg Intravenous Q4HRS PRN Son Stoddard Jr., D.ODes   4 mg at 08/04/20 2024   • Pharmacy Consult: Enteral tube insertion - review meds/change route/product selection  1 Each Other PHARMACY TO DOSE Neo Mayers M.D.       • ticagrelor (BRILINTA) tablet 90 mg  90 mg Enteral Tube BID Neo Mayers M.D.   90 mg at 08/04/20 1836   • acetaminophen (TYLENOL) tablet 650 mg  650 mg Enteral Tube Q6HRS PRN Neo Mayers M.D.   650 mg at 08/01/20 1615   • DOBUTamine (DOBUTREX) 1 mg/mL premix infusion  0-20 mcg/kg/min Intravenous Continuous Joe Mcgraw M.D.   Stopped at 08/04/20 0935   • norepinephrine (Levophed) infusion 8 mg/250 mL (premix)  0-30 mcg/min Intravenous Continuous Neo Mayers M.D. 18.8 mL/hr at 08/05/20 0319 10 mcg/min at 08/05/20 0319   • piperacillin-tazobactam (ZOSYN) 3.375 g in  mL IVPB  3.375 g Intravenous Q8HRS Chris Ingram D.O. 25 mL/hr at 08/05/20 0501 3.375 g at 08/05/20 0501   • aspirin (ASA) chewable tab 81 mg  81 mg Enteral Tube DAILY Hamzah Jaquez M.D.   81 mg at 08/04/20 0501   • atorvastatin (LIPITOR) tablet 40 mg   40 mg Enteral Tube Q EVENING Hamzah Jaquez M.D.   40 mg at 08/04/20 1835   • Respiratory Therapy Consult   Nebulization Continuous RT Zachariah Shipley M.D.       • ipratropium-albuterol (DUONEB) nebulizer solution  3 mL Nebulization Q2HRS PRN (RT) Zachariah Shipley M.D.       • senna-docusate (PERICOLACE or SENOKOT S) 8.6-50 MG per tablet 2 Tab  2 Tab Enteral Tube BID Zachariah Shipley M.D.   Stopped at 08/02/20 1800    And   • polyethylene glycol/lytes (MIRALAX) PACKET 1 Packet  1 Packet Enteral Tube QDAY PRN Zachariah Shipley M.D.        And   • magnesium hydroxide (MILK OF MAGNESIA) suspension 30 mL  30 mL Enteral Tube QDAY PRN Zachariah Shipley M.D.   30 mL at 08/01/20 1711    And   • bisacodyl (DULCOLAX) suppository 10 mg  10 mg Rectal QDAY PRN Zachariah Shipley M.D.       • MD Alert...ICU Electrolyte Replacement per Pharmacy   Other PHARMACY TO DOSE Zachariah Shipley M.D.       • lidocaine (XYLOCAINE) 1 % injection 1-2 mL  1-2 mL Tracheal Tube Q30 MIN PRN Zachariah Shipley M.D.   2 mL at 08/02/20 0424   • insulin regular (HumuLIN R,NovoLIN R) injection  2-9 Units Subcutaneous Q6HRS Zachariah Shipley M.D.   Stopped at 08/01/20 0600    And   • dextrose 50% (D50W) injection 50 mL  50 mL Intravenous Q15 MIN PRN Zachairah Shipley M.D.       • furosemide (LASIX) injection 20 mg  20 mg Intravenous BID DIURETIC Glenn Boss M.D.   20 mg at 08/05/20 0502       Fluids    Intake/Output Summary (Last 24 hours) at 8/5/2020 0631  Last data filed at 8/5/2020 0400  Gross per 24 hour   Intake 1589.87 ml   Output 2200 ml   Net -610.13 ml       Laboratory  Recent Labs     08/03/20  0417 08/04/20  0445 08/05/20  0137 08/05/20  0343   ISTATAPH 7.452 7.537* 7.416 7.508*   ISTATAPCO2 26.9 25.6* 33.0 27.5   ISTATAPO2 99* 80 74 194*   ISTATATCO2 20 22 22 23   HSCZYTV3SWB 98 97 95 100*   ISTATARTHCO3 18.8 21.7 21.2 21.8   ISTATARTBE -5* -1 -3 -1   ISTATTEMP 37.9 C 37.8 C 96.0 F 38.1 C   ISTATFIO2 40 30  --  60   ISTATSPEC Arterial Arterial  Arterial Arterial   ISTATAPHTC 7.439 7.524* 7.437 7.491   PODUDUSJ6AD 104* 84 67 200*         Recent Labs     08/03/20 0408 08/04/20 0327 08/04/20  1132 08/05/20  0516   SODIUM 140 142  --  144   POTASSIUM 3.5* 3.0* 3.3* 3.2*   CHLORIDE 107 105  --  106   CO2 19* 22  --  21   BUN 23* 20  --  23*   CREATININE 0.96 1.09  --  1.18   CALCIUM 8.1* 8.3*  --  8.2*     Recent Labs     08/03/20 0408 08/04/20 0327 08/05/20  0516   ALTSGPT 31  --   --    ASTSGOT 132*  --   --    ALKPHOSPHAT 28*  --   --    TBILIRUBIN 0.5  --   --    GLUCOSE 130* 127* 147*     Recent Labs     08/03/20 0408 08/04/20 0327 08/05/20  0516   WBC 10.1 11.3* 14.7*   NEUTSPOLYS 72.60* 67.70 70.80   LYMPHOCYTES 14.00* 17.80* 14.50*   MONOCYTES 9.40 10.40 12.40   EOSINOPHILS 3.20 3.30 1.00   BASOPHILS 0.30 0.30 0.30   ASTSGOT 132*  --   --    ALTSGPT 31  --   --    ALKPHOSPHAT 28*  --   --    TBILIRUBIN 0.5  --   --      Recent Labs     08/03/20 0408 08/03/20  1402 08/03/20 2248 08/04/20 0327 08/04/20  2347 08/05/20  0516   RBC 2.42*  --   --   --  2.23*  --  2.14*   HEMOGLOBIN 7.7*  --   --   --  7.2*  --  6.8*   HEMATOCRIT 23.0*  --   --   --  21.7*  --  21.8*   PLATELETCT 90*  --   --   --  111*  --  136*   APTT  --    < > 85.3* 81.8*  --  48.5*  --     < > = values in this interval not displayed.       Imaging  X-Ray:  I have personally reviewed the images and compared with prior images.    Assessment/Plan  * Acute ST elevation myocardial infarction (STEMI) involving left anterior descending (LAD) coronary artery (HCC)- (present on admission)  Assessment & Plan  Emergent left heart cath with PCI/stents to LAD  Complicated by cardiogenic shock, brief VF arrest with CPR x2-3 minutes  Ticagrelor, aspirin, statin  Holding BB due to hypotension   Impella discontinued 8/1    Ischemia of left lower extremity  Assessment & Plan  Likely preexisting PAD prior to hospitalization but not clear to what extent and this was exacerbated when impella was  placed.   Cool clammy left leg with coalescing mottling extending up to the thigh  No dopplerable on left DP/TP and popliteal pulses +/- femoral   8/4 s/p attempted to access left and right CFA. No flash or blood return in left CFA c/w occlusion.     Plan:   Continue frequent pulses check.   Vascular surgery following    Cardiogenic shock (HCC)- (present on admission)  Assessment & Plan  Appears cardiogenic shock has resolved.   Impella removed 8/1  Pt had some episodes of hypotension overnight, but off today.   CI remains in 2s.   Can discontinue PA cath. Will remove right femoral line and change to PICC line.       Acute respiratory failure with hypoxia (HCC)  Assessment & Plan  Secondary to STEMI with acute pulmonary edema, intubated PTA 7/30  8/3 s/p ET tube exchange to 8.0  8/4 extubated  8/5 reintubated due to significant alcohol withdrawal and vomiting.     Plan:   Daily SAT, goal RASS 0  Daily SBT  Continue diuresis  Mobility with PT/OT, goal level 2   Goal sat >92%      Anemia  Assessment & Plan  8/5 Hgb 6.8, no active bleeding. transfuse 1unit PRBC    Alcohol withdrawal delirium (HCC)  Assessment & Plan  Likely alcohol withdrawal with delirium  Heavy alcohol drink prior to hospitalization 1bottle of bourbon daily    Plan:   Continue precedex gtt, aim goal RASS 0  Ativan prn, need to be cautious on this in the setting of likely concomitant ICU delirium  Fentanyl IV prn.       Acute kidney injury (HCC)  Assessment & Plan  Secondary cardiogenic shock  Renally dose medications minimize nephrotoxic substances  Improving, but in the past 24 hours, creatinine slowly rising.   Monitor this closely, strict I/Os      ASCVD (arteriosclerotic cardiovascular disease)- (present on admission)  Assessment & Plan  With history of prior CABG     I had a long family meeting with 3 sons at bedside regarding goal of care, patient's critical condition.  Also had a phone conversation with son, Cleve, earlier this afternoon.  Family at this time would like to continue with surgery. All questions were answered.   Discussed with Dr. Coppola, Vascular Surgery.     VTE:  Heparin  Ulcer: H2 Antagonist  Lines: Central Line  Ongoing indication addressed    I have performed a physical exam and reviewed and updated ROS and Plan today (8/5/2020). In review of yesterday's note (8/4/2020), there are no changes except as documented above.     Discussed patient condition and risk of morbidity and/or mortality with RN, RT, Pharmacy and cardiology     The patient remains critically ill.  Critical care time = 90 minutes in directly providing and coordinating critical care and extensive data review.  No time overlap and excludes procedures.

## 2020-08-05 NOTE — CARE PLAN
Problem: Knowledge Deficit  Goal: Knowledge of disease process/condition, treatment plan, diagnostic tests, and medications will improve  Note: POC discussed with Significant other, all questions answered, medication education completed, educated on disease process.      Problem: Urinary Elimination:  Goal: Ability to reestablish a normal urinary elimination pattern will improve  Flowsheets (Taken 8/5/2020 1032)  Urinary Elimination: Catheter (Document on LDA)  Note: Q2 I&O, assessment of color and clarity and volume

## 2020-08-05 NOTE — PROGRESS NOTES
Dr Ingram updated on 15bt run vtach and increasing 4-8 bt run.  Mg, K and CaCl replacement ordered.  New IV placed

## 2020-08-05 NOTE — ASSESSMENT & PLAN NOTE
Likely alcohol withdrawal with delirium  Heavy alcohol drink prior to hospitalization 1bottfausto daily    Plan:   Continue precedex gtt, aim goal RASS 0  Ativan prn, need to be cautious on this in the setting of likely concomitant ICU delirium  Fentanyl IV prn.

## 2020-08-05 NOTE — THERAPY
Missed Therapy     Patient Name: Humberto Shannon  Age:  60 y.o., Sex:  male  Medical Record #: [id number]  Today's Date: 8/5/2020    Attempted PT evaluation. RN reports hold at this time. Pt was emergently re-intubated yesterday and had unsuccessful thrombectomy LLE likely requiring AKA. RN requests PT check back on Friday for appropriateness of evaluation.

## 2020-08-05 NOTE — PROGRESS NOTES
Called to bedside secondary to worsening respiratory failure and concern of aspiration.  Patient's been having some nausea and vomiting, there is been inability to pass NG tube, remains moderately encephalopathic on Precedex infusion.  At time of my evaluation patient was breathing and around 40s, adequately oxygenating however increasingly encephalopathic with some ongoing abdominal distention and concern for further emesis and likely aspiration.  Given his significant cardiac history with concern of decompensation and acute respiratory distress along with inability to control GI source with ?  Ileus decision was made to emergently intubate.  During procedure patient was noted to have moderate amount of bilious appearing fluid in the back of his throat.  Patient had bronchoscopy which also showed bilateral diffuse to bilious secretions as well as marked irritation/erythema consistent with aspiration
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
## 2020-08-06 PROBLEM — K56.7 ILEUS (HCC): Status: ACTIVE | Noted: 2020-01-01

## 2020-08-06 NOTE — ANESTHESIA TIME REPORT
Anesthesia Start and Stop Event Times     Date Time Event    8/6/2020 1301 Ready for Procedure     1307 Anesthesia Start     1603 Anesthesia Stop        Responsible Staff  08/06/20    Name Role Begin End    Nikolay Arboleda M.D. Anesth 1307 1603        Preop Diagnosis (Free Text):  Pre-op Diagnosis     left lower extremity critical limb ischemia; S/P STEMI        Preop Diagnosis (Codes):    Post op Diagnosis  Occlusion of left femoral artery (HCC)      Premium Reason  A. 3PM - 7AM    Comments: procedure: left femoral to profunda thrombectomy, left femoral endarterectomy; ASA 4E (cardiomyopathy ef 20%, Limb ischemia), placement of arterial line (ultrasound used)

## 2020-08-06 NOTE — PROGRESS NOTES
1600- Pt returned from OR with MD Arboleda and OR RN. Pt , BP SBP 70-80, unable to obtain pulse ox reading. Epi gtt at 0.1mcg/kg/min, Levophed at 12 mcg/min. MD Ingram to bedside, verbal orders received for CBC, CMP, Lactic acid, Magnesium and Phosphorous. Orders also received for 2 grams magnesium, 1 amp Bicarb, 1 gram CaCl and 1L LR, see MAR for administration times.     1605- Bicarb x2 amps ordered by MD Ingram   1610- Levo increased to 25, Epi to 0.2mcg/kg/min, verbal orders to start dobutamine at 2.5mcg/kg/min    1614- Dobutamine started  1615- FSBS 72, D50 administered per MD order  1618- Levophed to 30mcg/min  1619- Neosyneprhine 300mcg psuh administered    1623- Dobutamine increased to 5mcg/kg/min and 1g CaCl administered per MD order     1630- MD Hines to bedside, Vasopressin ordered, MD stated he would be back to talk with family.

## 2020-08-06 NOTE — CARE PLAN
Problem: Safety  Goal: Will remain free from injury  Outcome: PROGRESSING AS EXPECTED  Bed locked and in low position, Lower bed rails raised, bed alarm in use, call light within reach, treaded slipper socks on patient and patient room near nursing station.      Problem: Skin Integrity  Goal: Risk for impaired skin integrity will decrease  Outcome: PROGRESSING AS EXPECTED  Assess patient's skin at the beginning of the shift. Turn patient every two hours and monitor under all medical devices throughout entire shift. Maintain a clean, dry linen environment. Float heals and elbows. Waffle cushion and mepilex in place. Provide appropriate wound prevention interventions as they apply.

## 2020-08-06 NOTE — DIETARY
Nutrition Services: Pt without nutrition    Admit day 7.  Pt was assessed for TF and Cortrak was placed 8/1. It is unclear from chart review if/when TF was ever started.  RD documented on 8/3 that OGT was in place to suction secondary to emesis.  Therefore, pt has had no nutrition for at least 3 days, but potentially up to 7 days.  OGT continues to suction with suction being paused for 2 hours for meds.   Plan for definitive treatment of his ischemic LLE today per Vascular Surgery.  Levophed @ 4 mcg/min.  Rectal tube in place.    If pt cannot resume enteral nutrition, then will need to consider TPN.    RD following.

## 2020-08-06 NOTE — ASSESSMENT & PLAN NOTE
8/4 ileus on abdominal xray. Vomited, delirious, subsequently intubated  Continue continuous NG tube

## 2020-08-06 NOTE — ANESTHESIA QCDR
2019 [facility] Clinical Data Registry (for Quality Improvement)     Postoperative nausea/vomiting risk protocol (Adult = 18 yrs and Pediatric 3-17 yrs)- (430 and 463)  General inhalation anesthetic (NOT TIVA) with PONV risk factors: No  Provision of anti-emetic therapy with at least 2 different classes of agents: N/A  Patient DID NOT receive anti-emetic therapy and reason is documented in Medical Record: N/A    Multimodal Pain Management- (477)  Non-emergent surgery AND patient age >= 18: No  Use of Multimodal Pain Management, two or more drugs and/or interventions, NOT including systemic opioids:   Exception: Documented allergy to multiple classes of analgesics:     Smoking Abstinence (404)  Patient is current smoker (cigarette, pipe, e-cig, marijuanna): No  Elective Surgery:   Abstinence instructions provided prior to day of surgery:   Patient abstained from smoking on day of surgery:     Pre-Op Beta-Blocker in Isolated CABG (44)  Isolated CABG AND patient age >= 18: No  Beta-blocker admin within 24 hours of surgical incision:   Exception:of medical reason(s) for not administering beta blocker within 24 hours prior to surgical incision (e.g., not  indicated,other medical reason):     PACU assessment of acute postoperative pain prior to Anesthesia Care End- Applies to Patients Age = 18- (ABG7)  Initial PACU pain score is which of the following: < 7/10  Patient unable to report pain score: N/A    Post-anesthetic transfer of care checklist/protocol to PACU/ICU- (426 and 427)  Upon conclusion of case, patient transferred to which of the following locations: ICU  Use of transfer checklist/protocol: Yes  Exclusion: Service Performed in Patient Hospital Room (and thus did not require transfer): N/A  Unplanned admission to ICU related to anesthesia service up through end of PACU care- (MD51)  Unplanned admission to ICU (not initially anticipated at anesthesia start time): No

## 2020-08-06 NOTE — ANESTHESIA POSTPROCEDURE EVALUATION
Patient: Humberto Shannon    Procedure Summary     Date: 08/06/20 Room / Location: [facility] 09 / SURGERY [facility]    Anesthesia Start: 1307 Anesthesia Stop: 1603    Procedures:       THROMBECTOMY-FEMORAL TO PROFUNDA (Left Leg Upper)      AMPUTATION, ABOVE KNEE (Left Leg Upper)      ENDARTERECTOMY, FEMORAL (Left Leg Upper) Diagnosis: (left lower extremity critical limb ischemia; S/P STEMI)    Surgeon: Leonel Coppola M.D. Responsible Provider: Nikolay Arboleda M.D.    Anesthesia Type: general ASA Status: 4 - Emergent          Final Anesthesia Type: general  Last vitals  BP   Blood Pressure: 100/67, Arterial BP: 142/62    Temp   37.8 °C (100 °F)    Pulse   Pulse: 85   Resp   (!) 21    SpO2   92     Anesthesia Post Evaluation    Patient location during evaluation: ICU  Patient participation: complete - patient cannot participate  Level of consciousness: obtunded/minimal responses  Pain score: 0    Airway patency: patent (intubated)  Anesthetic complications: no  Cardiovascular status: hemodynamically unstable (on epi and norepi drips, cardiogenic shock)  Respiratory status: ETT and ventilator  Hydration status: stable    PONV: none

## 2020-08-06 NOTE — PROGRESS NOTES
Lab called with critical result of Troponin 2436 at 1915. Critical lab result read back to lab.   Dr. Ingram notified of critical lab result at 1915.  Critical lab result read back by Dr. Ingram.    Orders received from Dr. Ingram to repeat Troponin lab Q6 x3.

## 2020-08-06 NOTE — OP REPORT
OPERATIVE REPORT      Patient:  Humberto Shannon     Procedure Date:  08/06/20    Indication:  Left lower extremity arterial ischemia, non-salvageable leg    Pre-Operative Diagnosis:  STEMI  Cardiogenic shock  EtOH withdrawal  Respiratory failure  Blood loss anemia  Acute left lower extremity arterial ischemia    Post-Operative Diagnosis:  Same  Aortoiliac occlusive disease    Procedure:  Left common femoral endarterectomy and bovine patch angioplasty  Left SFA thrombectomy  Left profunda thrombectomy  Left Iliac thromectomy  Introduction of catheter to aorta  Abdominal aortogram  Left iliac angiogram  Left external iliac angioplasty and stenting  Left through-knee amputation    Surgeon:  Leonel Coppola M.D.    Assistant:  David Venegas MD    Anesthesia:  General endotracheal    EBL:  100cc    Specimen:  Left common femoral and external iliac plaque  Left leg    Complications:  None    Findings:  Diagnostic imaging was required as there was no pre-operative CT scan or iliac imaging to guide therapy.     There was a diffuse hematoma in the soft tissues of the left groin. On opening the L CFA, the vessel was occluded with chronic smooth fibrous plaque. There was no acute clot. Plaque extended proximally into the EIA. It was evident that thrombectomy alone would be inadequate to restore flow to the leg to allow an amputation to heal..     Thrombectomy of the L SFA, profunda, and iliac was performed and only a small amount of acute clot was retrieved from the SFA. There was no acute clot within any other vessel. Inflow from the iliac was very poor.    A femoral endarterectomy and bovine patch angioplasty followed by L EIA angioplasty and stenting was performed.    Throughout the case, the patient was hemodynamically unstable with runs of SVT and requiring an epinephrine drip. Due to the instability, I elected to perform a through-knee amputation rather than a formal AKA.    Procedure:  Informed consent was  obtained from the patient's two sons prior to surgery after a full discussion of the significant risks was discussed. They elected to proceed. The patient, while unable to speak, was involved and also indicated he wished to proceed. The patient was brought to the operating room and placed on the table in a supine position. General anesthesia was induced, maira-operative antibiotics were given, and a time-out was performed verifying the correct site of surgery. The patient was prepped and draped in the usual sterile fashion.     A longitudinal incision was made in the left groin over the femoral artery. There was an extensive diffuse hematoma in the soft tissues obscuring tissue planes. Bovie was used to dissect through the subcutaneous tissues and the femoral sheath was incised. The femoral artery had no pulse. Proximal control was obtained at the distal external iliac artery. There was an angioseal in place just below the inguinal ligament. Distal control was obtained around the SFA and two large profunda branches.    The patient was heparanized with 1000U/kg of heparin which was allowed to circulate prior to clamping. ACTs were checked every 30 minutes and maintained 250-300 for the duration of the case.    The common femoral artery was clamped and a transverse arteriotomy was made with an 11 blade scalpel. On opening the vessel, there was no apparent acute clot. Rather, the vessel was occluded with dense smooth homogenous fibrous plaque. At this point, it was apparent that a thrombectomy alone would be insufficient to restore adequate flow to heal an amputation.    The arteriotomy was extended longitudinally from the bifurcation to the distal external iliac artery. There was no back bleeding from the SFA and minimal flow from highly stenotic profunda ostia.    An endarterectomy of the entire common femoral and distal external iliac artery was performed. We performed a thrombectomy of the SFA using bahman  catheters. A minimal amount of acute clot was extracted but no back bleeding was obtained. Next, thrombectomy of both profunda branches was performed. No acute clot was retrieved and both branches back bled briskly in the absence of thick plaque in the femoral artery. The iliac clamp was released and inflow was very poor and non-pulsatile. A bahman was passed retrograde with difficulty and on withdrawal, only a small amount of chronic plaque was extracted. Inflow remained inadequate.    A bovine patch was sutured in place using running 5-0 prolene suture. Prior to completing the suture line, the vessel was flushed. The suture line was secured and was hemostatic.    Throughout the endarterectomy, the patient became unstable requiring an epinephrine drip and increasing presser support. It was clear that we would not be able to perform a formal amputation, but also that inflow to the leg had not been adequately restored.    We elected to perform a through-knee amputation. A circumferential incision was made at the knee joint through skin and subcutaneous tissues. The knee joint was exposed and ligamentous attachments were sharply divided. Bovie was used to transect ligamentous attachments. The popliteal artery and vein were clamped, doubly ligated and divided. The leg was amputated and handed off as a specimen.    Next, the patch was accessed with needle and a 6 Iraqi sheath was placed retrograde. I was able to cross the iliac lesion and advanced a catheter into the abdominal aorta. Aortogram and left ilaic angiography were performed confirming patency of the infrarenal aorta, right iliac, left common iliac, but diffuse nearly-occlusive stenosis of the entire left external iliac artery.    I exchanged for a stiff wire and performed angioplasty and stenting using two balloon expandable bare metal stents (8x60 and 8x40), the distal of which extended to the inguinal ligament. Completion imaging was obtained confirming  rapid flow through the stents and into the femoral artery. The sheath was removed and the access site was closed with 6-0 prolene.     Hemostasis was obtained and the incision was copiously irrigated. The femoral sheath was reapproximated with interrupted 2-0 vicryl. Two layers of 3-0 vicryl were placed to close dead space and skin was closed with staples. Interrupted 2-0 nylon was placed through skin and fascia at the open stump to prevent skin retraction.    Occlusive dressings were applied. The patient was recovered from anesthesia and taken to ICU in critical condition.    Leonel Coppola MD  Vascular Surgery   [facility]

## 2020-08-06 NOTE — PROGRESS NOTES
Progress Note    CC: absent pulses in LLE    Interval History: 60 y.o. male who presented 7/30/2020 with STEMI and acute heart failure w/ EF 25-30% taken emergently for impella, cath/PCI, has residual untreated RCA dz. Had vfib arrest shortly after stenting. Remains intubated.      Unknown but apparent hx of vascular disease. He developed a cold LLE after impella was placed. This was managed by cardiology team with an external R/L fem-fem circuit. After removal of the impella and fem/fem, there were no appreciable pulses in the feet.    8/4/20 Unsuccessful thrombectomy of the L CFA.     8/6/20 Plan for definitive treatment of his ischemic LLE today.    Review of Systems  Negative for chest pain or SOB    Medications  Prior to Admission Medications   Prescriptions Last Dose Informant Patient Reported? Taking?   aspirin EC 81 MG EC tablet UNK at K Patient's Home Pharmacy No No   Sig: Take 1 Tab by mouth every day.   atorvastatin (LIPITOR) 80 MG tablet UNK at K Patient's Home Pharmacy Yes No   Sig: Take 80 mg by mouth every day.   baclofen (LIORESAL) 10 MG Tab UNK at K Patient's Home Pharmacy Yes No   Sig: take 1 tablet by mouth four times a day with food or milk   celecoxib (CELEBREX) 200 MG Cap UNK at K Patient's Home Pharmacy Yes No   Sig: Take 200 mg by mouth 2 times a day.   clopidogrel (PLAVIX) 75 MG Tab UNK at K Patient's Home Pharmacy Yes No   Sig: Take 75 mg by mouth every day.   cyclobenzaprine (FLEXERIL) 10 MG Tab UNK at K Patient's Home Pharmacy Yes No   Sig: take 1 tablet by mouth three times a day if needed for SPASMS   esomeprazole (NEXIUM) 20 MG capsule UNK at K Patient's Home Pharmacy Yes No   Sig: Take 20 mg by mouth every morning before breakfast.   ibuprofen (MOTRIN) 800 MG Tab UNK at K Patient's Home Pharmacy Yes No   Sig: Take 800 mg by mouth every 8 hours as needed.   isosorbide mononitrate SR (IMDUR) 30 MG TABLET SR 24 HR UNK at K Patient's Home Pharmacy Yes No   Sig: Take  30 mg by mouth every morning.   metoprolol SR (TOPROL XL) 25 MG TABLET SR 24 HR UNK at [facility] Patient's Home Pharmacy No No   Sig: Take 1 Tab by mouth every day.   nitroGLYCERIN (NITROSTAT) 0.3 MG SL tablet UNK at [facility] Patient's Home Pharmacy No No   Sig: Place 1 Tab under tongue as needed for Chest Pain.      Facility-Administered Medications:  See MAR         Physical Exam  Vitals:    08/06/20 0630   BP:    Pulse: 97   Resp: (!) 27   Temp:    SpO2: 100%       Pulse/Extremity Exam:    Femorals:        Right monphasic       Left monophasic     Popliteal       Right absent       Left absent    Pedal Pulses:       Right DP absent       Right PT monophasic       Left DP absent       Left PT absent    Wounds: L foot/lower leg cool, early mottling.     General appearance: intubated, ill-appearing  Abdomen: Soft, nondistended  Skin: No rashes, wounds. Cold from the left knee distally. Left thigh is warm. Blanched skin on lower L leg but mottling not appreciated. R foot mottled and cool.      Labs reviewed today:  Recent Labs     08/04/20  0327 08/05/20  0516 08/06/20  0500   WBC 11.3* 14.7* 16.2*   RBC 2.23* 2.14* 2.56*   HEMOGLOBIN 7.2* 6.8* 8.4*   HEMATOCRIT 21.7* 21.8* 26.4*   MCV 97.3 101.9* 103.1*   MCH 32.3 31.8 32.8   MCHC 33.2* 31.2* 31.8*   RDW 52.3* 54.3* 55.9*   PLATELETCT 111* 136* 144*   MPV 11.9 12.5 11.9     Recent Labs     08/04/20  0327  08/05/20  0516  08/05/20  1759 08/05/20  2330 08/06/20  0510   SODIUM 142  --  144  --   --   --  141   POTASSIUM 3.0*   < > 3.2*   < > 3.5* 3.9 3.9   CHLORIDE 105  --  106  --   --   --  107   CO2 22  --  21  --   --   --  19*   GLUCOSE 127*  --  147*  --   --   --  117*   BUN 20  --  23*  --   --   --  24*   CREATININE 1.09  --  1.18  --   --   --  1.02   CALCIUM 8.3*  --  8.2*  --   --   --  8.5    < > = values in this interval not displayed.     Recent Labs     08/04/20  0327 08/05/20  0516 08/06/20  0510   GLUCOSE 127* 147* 117*     Recent Labs     08/05/20  1548  08/05/20  2235 08/06/20  0510   APTT 70.8* 65.1* 67.1*         Recent Labs     08/04/20  0327   TRIGLYCERIDE 125     No results for input(s): TROPONINI in the last 72 hours.    Urinalysis:    No results found       Assessment/Plan & Medical Decision-Making:  The patient has left lower extremity critical limb ischemia. He is s/p NSTEMI/Cardiogenic shock s/p PCI/LAD stenting, impella, arrest, and has untreated RCA dz.     L Mid/distal SFA is occluded. There was weak reconstitution in the pop and patent tibials on duplex. No images of the foot were saved, but note was made there was no flow in the foot.     He is a poor operative candidate for a major revasc procedure and general anesthesia given his overall condition.     Attempt to revasc endovascularly was not successful.      Dr. Coppola had a long discussion with the patients family including patient who is cognizant enough to understand and provide his wishes.  We will proceed with AKA and thrombectomy today.    Continue NPO     Reta Roman PA-C for [facility]  Vascular Surgery   [facility]  Office: [phone]    Attending Addendum  Pt seen/examined today. I agree w/ above documentation. LLE not salvageable, frankly ischemic. Pt and family have decided they want everything done for a chance at survival. Pt is awake and alert today and indicates he wants to proceed. Consent previously obtained from both of his sons after all risks, benefits, and alternatives were explained including risk of death, MI, respiratory failure, renal failure, and bleeding. All wish to proceed. LLE thrombectomy and AKA today.    Leonel Coppola MD

## 2020-08-06 NOTE — CARE PLAN
Problem: Ventilation Defect:  Goal: Ability to achieve and maintain unassisted ventilation or tolerate decreased levels of ventilator support  Intervention: Support and monitor invasive and noninvasive mechanical ventilation  Note: Vent Day #7    APVCMV  RR 18  Vt 440  PEEP 8  FiO2 40%

## 2020-08-06 NOTE — PROGRESS NOTES
Critical Care Progress Note    Date of admission  7/30/2020    Chief Complaint  60 y.o. male admitted 7/30/2020 with anterior STEMI, status post LAD stenting, cardiogenic shock, respiratory failure    Hospital Course    60 y.o. male who presented 7/30/2020 in transfer from [facility] in [facility] with subtle ST elevation anterior MI.  He has a history of prior CABG, vascular disease and ongoing smoking by report.  He apparently presented earlier in the day with left arm pain and dyspnea with rapid deterioration in his condition requiring intubation and transfer to [facility] for further care.  He was transferred on a nitroglycerin drip to treat significant hypertension initially.  Echocardiogram showed significant drop in ejection fraction to 25-30% with pulmonary edema.  He was taken urgently to the Cath Lab where he underwent successful PCI/stents to the LAD.  He had a brief episode of V. fib arrest requiring 2-3 minutes of CPR and 1 mg of epinephrine.  A left femoral Impella ventricular assist device was placed and temporary right to left external femoral femoral bypass was placed due to low flow distal to the Impella catheter placement with significant underlying iliac and femoral disease noted with multiple collaterals by report.  [place] catheter is in place.  He is currently intubated on full ventilator support, sedated and unresponsive.  Hemodynamics reviewed in detail and direct handoff performed with cardiology at bedside.  8/2 and 8/3 off inotropes.   8/3 ET tube exchange to 8.0 due to vomiting and had to put high cuff pressure on ET tube size 7.5  8/4 extubated  8/4 Attempted access of left femoral artery, but unable.  8/5 reintubated. Pt was tachycardic, hallucinating and vomited, presumed alcohol withdrawal. NG tube in place with immediate return of 500cc green bile vomit        Interval Problem Update  Reviewed last 24 hour events:  No acute events  overnight  Remains critically ill  On norepinephrine at 6  On precedex at 0.4 and fentanyl gtt for sedation and pain control.   Continuous NG tube with good amount NG output  Febrile with Tm 38.1  HR in 80-90s currently   SBP 90-100s, with MAP >71  On full vent support with 40%, PEEP 8  Creatinine 1.18 this am (from 1.09)  S/p 1U PRBC yesterday, Hgb 8.4  Positive 842cc in the fluid balance, +1L since admission    Review of Systems  Review of Systems   Unable to perform ROS: Acuity of condition        Vital Signs for last 24 hours   Temp:  [37.7 °C (99.9 °F)-38.3 °C (100.9 °F)] 38 °C (100.4 °F)  Pulse:  [] 97  Resp:  [12-38] 27  BP: ()/(55-73) 108/72  SpO2:  [90 %-100 %] 100 %    Hemodynamic parameters for last 24 hours       Respiratory Information for the last 24 hours  Vent Mode: Spont  Rate (breaths/min): 18  Vt Target (mL): 440  PEEP/CPAP: 8  P Support: 5  MAP: 11  Length of Weaning Trial (Hours): 1.5  Control VTE (exp VT): 383    Physical Exam   Physical Exam  Vitals signs and nursing note reviewed.   Constitutional:       Comments: Intubated, sedated RASS 0   HENT:      Head: Normocephalic and atraumatic.      Mouth/Throat:      Comments: ET tube in place  Cardiovascular:      Rate and Rhythm: Normal rate and regular rhythm.      Pulses: Normal pulses.      Heart sounds: Normal heart sounds. No murmur.   Pulmonary:      Effort: No respiratory distress.      Breath sounds: Rales present. No wheezing.      Comments: diminsihed breath sounds  Abdominal:      General: Bowel sounds are normal. There is no distension.      Palpations: Abdomen is soft.      Tenderness: There is no abdominal tenderness. There is no guarding.   Musculoskeletal:         General: Swelling present.   Skin:     Capillary Refill: Capillary refill takes more than 3 seconds. 2-3 in right leg and >3 in left leg     Comments: Cool to the touch on the left side  More prominent mottling in knee and lower ext and they appear  coalescing, cool to touch extending up to left thigh    No dopplerable pulses noted in left DP/TP, and popliteal, +/- femoral.     Right leg: new mottling appearance noted in the right foot. Clammy to touch. TP pulse is dopplerable       Neurological:      Mental Status: He is alert.         Medications  Current Facility-Administered Medications   Medication Dose Route Frequency Provider Last Rate Last Dose   • potassium chloride (KCL) ivpb 10 mEq  10 mEq Intravenous Once FRAN Saldana.O.       • famotidine (PEPCID) tablet 20 mg  20 mg Enteral Tube Q12HRS Neo Mayers M.D.   20 mg at 08/06/20 0504    Or   • famotidine (PEPCID) injection 20 mg  20 mg Intravenous Q12HRS Neo Mayers M.D.   20 mg at 08/05/20 1746   • fentaNYL (SUBLIMAZE) injection 50 mcg  50 mcg Intravenous Q15 MIN PRN Chris Ingram D.O.        And   • fentaNYL (SUBLIMAZE) injection 100 mcg  100 mcg Intravenous Q15 MIN PRN Chris Ingram D.O.   100 mcg at 08/06/20 0540    And   • fentaNYL (SUBLIMAZE) 50 mcg/mL in 50mL (Continuous Infusion)   Intravenous Continuous Chris Ingram D.O. 1 mL/hr at 08/06/20 0536 50 mcg/hr at 08/06/20 0536    And   • dexmedetomidine (PRECEDEX) 400 mcg/100mL NS premix infusion  0.1-1.5 mcg/kg/hr Intravenous Continuous Chris Ingram D.O. 6.9 mL/hr at 08/06/20 0535 0.3 mcg/kg/hr at 08/06/20 0535   • LORazepam (ATIVAN) injection 2 mg  2 mg Intravenous Q4HRS PRN Chris Ingram D.O.   2 mg at 08/06/20 0143   • K+ Scale: Goal of 4.5  1 Each Intravenous Q6HRS FRAN Saldana.O.   1 Each at 08/06/20 0600   • oxyCODONE immediate-release (ROXICODONE) tablet 5 mg  5 mg Enteral Tube Q4HRS PRN FRAN Saldana.O.        Or   • oxyCODONE immediate release (ROXICODONE) tablet 10 mg  10 mg Enteral Tube Q4HRS PRN Chris Ingram D.O.   10 mg at 08/04/20 1835   • polyethylene glycol/lytes (MIRALAX) PACKET 1 Packet  1 Packet Enteral Tube BID Neo Mayers M.D.   Stopped at 08/02/20 1800   • bisacodyl (DULCOLAX) suppository 10 mg  10 mg Rectal DAILY  Neo Mayers M.D.   Stopped at 08/03/20 0600   • thiamine tablet 100 mg  100 mg Enteral Tube BID Neo Mayers M.D.   100 mg at 08/06/20 0504   • heparin injection 3,200 Units  3,200 Units Intravenous PRN Hamzah Jaquez M.D.   3,200 Units at 08/05/20 0213    And   • heparin infusion 25,000 units in 500 mL 0.45% NACL   Intravenous Continuous Hamzah Jaquez M.D. 24 mL/hr at 08/06/20 0545 1,200 Units/hr at 08/06/20 0545   • ondansetron (ZOFRAN) syringe/vial injection 4 mg  4 mg Intravenous Q4HRS PRN Son Stoddard Jr., D.O.   4 mg at 08/04/20 2024   • Pharmacy Consult: Enteral tube insertion - review meds/change route/product selection  1 Each Other PHARMACY TO DOSE Neo Mayers M.D.       • ticagrelor (BRILINTA) tablet 90 mg  90 mg Enteral Tube BID Neo Mayers M.D.   90 mg at 08/06/20 0600   • acetaminophen (TYLENOL) tablet 650 mg  650 mg Enteral Tube Q6HRS PRN Neo Mayers M.D.   650 mg at 08/06/20 0525   • DOBUTamine (DOBUTREX) 1 mg/mL premix infusion  0-20 mcg/kg/min Intravenous Continuous Joe Mcgraw M.D.   Stopped at 08/04/20 0935   • norepinephrine (Levophed) infusion 8 mg/250 mL (premix)  0-30 mcg/min Intravenous Continuous Neo Mayres M.D. 11.3 mL/hr at 08/05/20 2015 6 mcg/min at 08/05/20 2015   • aspirin (ASA) chewable tab 81 mg  81 mg Enteral Tube DAILY Hamzah Jaquez M.D.   81 mg at 08/06/20 0504   • atorvastatin (LIPITOR) tablet 40 mg  40 mg Enteral Tube Q EVENING Hamzah Jaquez M.D.   40 mg at 08/05/20 1746   • Respiratory Therapy Consult   Nebulization Continuous RT Zachariah Shipley M.D.       • ipratropium-albuterol (DUONEB) nebulizer solution  3 mL Nebulization Q2HRS PRN (RT) Zachariah Shipley M.D.       • senna-docusate (PERICOLACE or SENOKOT S) 8.6-50 MG per tablet 2 Tab  2 Tab Enteral Tube BID Zachariah Shipley M.D.   Stopped at 08/02/20 1800    And   • polyethylene glycol/lytes (MIRALAX) PACKET 1 Packet  1 Packet Enteral Tube QDAY PRN Zachariah Shipley M.D.        And   •  magnesium hydroxide (MILK OF MAGNESIA) suspension 30 mL  30 mL Enteral Tube QDAY PRN Zachariah Shipley M.D.   30 mL at 08/01/20 1711    And   • bisacodyl (DULCOLAX) suppository 10 mg  10 mg Rectal QDAY PRN Zachariah Shipley M.D.       • MD Alert...ICU Electrolyte Replacement per Pharmacy   Other PHARMACY TO DOSE Zachariah Shipley M.D.       • lidocaine (XYLOCAINE) 1 % injection 1-2 mL  1-2 mL Tracheal Tube Q30 MIN PRN Zachariah Shipley M.D.   2 mL at 08/02/20 0424   • furosemide (LASIX) injection 20 mg  20 mg Intravenous BID DIURETIC Glenn Boss M.D.   20 mg at 08/06/20 0505       Fluids    Intake/Output Summary (Last 24 hours) at 8/6/2020 0716  Last data filed at 8/6/2020 0600  Gross per 24 hour   Intake 2780.89 ml   Output 1710 ml   Net 1070.89 ml       Laboratory  Recent Labs     08/04/20  0445 08/05/20  0137 08/05/20  0343 08/06/20  0513   ISTATAPH 7.537* 7.416 7.508*  --    ISTATAPCO2 25.6* 33.0 27.5  --    ISTATAPO2 80 74 194*  --    ISTATATCO2 22 22 23  --    RAWXOPR0AIN 97 95 100*  --    ISTATARTHCO3 21.7 21.2 21.8  --    ISTATARTBE -1 -3 -1  --    ISTATTEMP 37.8 C 96.0 F 38.1 C 37.9 C   ISTATFIO2 30  --  60 40   ISTATSPEC Arterial Arterial Arterial Venous   ISTATAPHTC 7.524* 7.437 7.491  --    YNBOLNVO0GR 84 67 200*  --          Recent Labs     08/04/20  0327  08/05/20  0516  08/05/20  1759 08/05/20  2330 08/06/20  0510   SODIUM 142  --  144  --   --   --  141   POTASSIUM 3.0*   < > 3.2*   < > 3.5* 3.9 3.9   CHLORIDE 105  --  106  --   --   --  107   CO2 22  --  21  --   --   --  19*   BUN 20  --  23*  --   --   --  24*   CREATININE 1.09  --  1.18  --   --   --  1.02   CALCIUM 8.3*  --  8.2*  --   --   --  8.5    < > = values in this interval not displayed.     Recent Labs     08/04/20 0327 08/05/20 0516 08/06/20  0510   GLUCOSE 127* 147* 117*     Recent Labs     08/04/20 0327 08/05/20 0516 08/06/20  0500   WBC 11.3* 14.7* 16.2*   NEUTSPOLYS 67.70 70.80 75.40*   LYMPHOCYTES 17.80* 14.50* 10.60*    MONOCYTES 10.40 12.40 10.80   EOSINOPHILS 3.30 1.00 1.90   BASOPHILS 0.30 0.30 0.40     Recent Labs     08/04/20  0327  08/05/20  0516  08/05/20  1548 08/05/20  2235 08/06/20  0500 08/06/20  0510   RBC 2.23*  --  2.14*  --   --   --  2.56*  --    HEMOGLOBIN 7.2*  --  6.8*  --   --   --  8.4*  --    HEMATOCRIT 21.7*  --  21.8*  --   --   --  26.4*  --    PLATELETCT 111*  --  136*  --   --   --  144*  --    APTT  --    < >  --    < > 70.8* 65.1*  --  67.1*    < > = values in this interval not displayed.       Imaging  X-Ray:  I have personally reviewed the images and compared with prior images.    Assessment/Plan  * Ischemia of left lower extremity  Assessment & Plan  Likely preexisting PAD prior to hospitalization but not clear to what extent and this was likely exacerbated when impella was placed in left femoral.   Cool clammy left leg with coalescing mottling extending up to the thigh   No dopplerable on left DP/TP and popliteal pulses +/- femoral   8/4 s/p attempted to access left and right CFA. No flash or blood return in left CFA c/w occlusion.   8/4 new mild mottling in the distal foot.     Plan:   Plan for vascular surgery today   NPO  Continue frequent pulses check. Not able to doppler in DP, TP, pop in left leg. Dopplerable pulse on TP and pop on right      Cardiogenic shock (HCC)- (present on admission)  Assessment & Plan  7/30-8/3 cardiogenic shock resolved.   8/1 Impella removed  8/4 back on norepinephrine after reintubated    Plan:  Keep norepinephrine to keep MAP >65  Would like to minimize norepi as much as possible given his severe PAD resulting in mild cyanosis in right lower ext. May be contributing the mottling appearance on the left leg          Acute respiratory failure with hypoxia (HCC)  Assessment & Plan  Secondary to STEMI with acute pulmonary edema, intubated PTA 7/30  8/3 s/p ET tube exchange to 8.0  8/4 extubated  8/5 reintubated due to significant alcohol withdrawal and vomiting.      Plan:   On precedex gtt with goal RASS 0  Daily SBT  Will be going to OR today, not plan to extubate today. Continue spontaneous mode when tolerate  Continue gentle diuresis  Hold mobility due to OR  Goal sat >92%      Ileus (HCC)  Assessment & Plan  8/4 ileus on abdominal xray. Vomited, delirious, subsequently intubated  Continue continuous NG tube    Acute ST elevation myocardial infarction (STEMI) involving left anterior descending (LAD) coronary artery (HCC)- (present on admission)  Assessment & Plan  7/30 Emergent left heart cath with PCI/stents to LAD  7/30 Complicated by cardiogenic shock, brief VF arrest with CPR x2-3 minutes  8/1 Impella was removed    Plan:   Continue ticagrelor, aspirin, statin  Holding BB due to hypotension       Anemia  Assessment & Plan  8/5 Hgb 6.8, no active bleeding.s/p 1unit PRBC    Alcohol withdrawal delirium (HCC)  Assessment & Plan  Likely alcohol withdrawal with delirium  Heavy alcohol drink prior to hospitalization 1bottle of bourbon daily    Plan:   Continue precedex gtt, aim goal RASS 0  Ativan prn, need to be cautious on this in the setting of likely concomitant ICU delirium  Fentanyl IV prn.       Acute kidney injury (HCC)  Assessment & Plan  Likely multifactorial secondary to ATN/cardiorenal from previous cardiogenic shock  Creatinine stable  Renally dose medications minimize nephrotoxic substances  Monitor this closely, strict I/Os      ASCVD (arteriosclerotic cardiovascular disease)- (present on admission)  Assessment & Plan  With history of prior CABG       VTE:  Heparin  Ulcer: H2 Antagonist  Lines: Central Line  Ongoing indication addressed    I have performed a physical exam and reviewed and updated ROS and Plan today (8/6/2020). In review of yesterday's note (8/5/2020), there are no changes except as documented above.     Discussed patient condition and risk of morbidity and/or mortality with RN, RT, Pharmacy and cardiology     The patient remains critically  ill.  Critical care time = 42 minutes in directly providing and coordinating critical care and extensive data review.  No time overlap and excludes procedures.    Addendum  Pt went to OR today at 12.30pm.   D/w with Dr. Coppola, Vascular Surgery, pre and post op. Initial plan was to do left AKA, but given pt's hemodynamic instability, pt had femoral endarterectomy with patch angioplasty, left EIA stent and through-knee amputation.     At ICU arrival, received hand off from anesthesia. Pt intubated with epi gtt, NE gtt  Intraop, pt was started on epi gtt at 0.1mcg/kg/min, NE at 15.   Had episode of Vtach s/p amiodarone bolus 150 x1    Since pt arrived in ICU, pt is clinically deteriorating. See the full details of sequence of events in nurse's note  We escalated all the inotropes including epi at 12, norepi at 30, dobutamine at 10, vasopressin, phenylephrine.   Multiple doses of bicarb were given to keep pH >7.3  Total 2grs of calcium chloride, 2grams Mg, 2L LR  Repeat labs with K 4.8, cr 1.4, HCO3 13  Pt developed monomorphic Vtach in 170-190s s/p cardioversion x2 at 150J and 200J, which converted back to NSR  Amiodarone bolus 150mg IV x1 given, followed with gtt.   Pt is on full vent support, 100%/PEEP 10, paO2 125, sat >93%  Family (3 sons and 1 daughter) came to bedside. I explained patient's critical condition, all questions are answered.   I also called Cardiology Dr. Molina regarding pt's critical condition.   Family would like to continue at this time.   Code status DNR, I ok  Very high risk mortality    Additional critical care time spent is 110 mins.     Dr. Hines will take over from me

## 2020-08-06 NOTE — ANESTHESIA PROCEDURE NOTES
Arterial Line  Performed by: Nikolay Arboleda M.D.  Authorized by: Nikolay Arboleda M.D.     Start Time:  8/6/2020 1:15 PM  End Time:  8/6/2020 1:20 PM  Localization: ultrasound guidance  Image captured, interpreted and electronically stored.  Patient Location:  OR  Indication: continuous blood pressure monitoring and blood sampling needed        Catheter Size:  20 G  Laterality:  Left  Site:  Radial artery  Line Secured:  Antimicrobial disc and transparent dressing  Events: patient tolerated procedure well with no complications

## 2020-08-06 NOTE — ANESTHESIA PREPROCEDURE EVALUATION
Relevant Problems   CARDIAC   (+) ASCVD (arteriosclerotic cardiovascular disease)   (+) Acute ST elevation myocardial infarction (STEMI) involving left anterior descending (LAD) coronary artery ([facility])   (+) Coronary artery disease involving native coronary artery of native heart without angina pectoris   (+) Coronary artery disease involving native coronary artery with unstable angina pectoris ([facility])   (+) Critical lower limb ischemia   (+) Essential hypertension, benign   (+) Ischemia of left lower extremity   (+) NSTEMI (non-ST elevated myocardial infarction) ([facility])   (+) S/P CABG x 3 2008         (+) Acute kidney injury ([facility])       Physical Exam    Airway   Patient is intubated/trached  Comments: Intubated and sedated   Cardiovascular   Rhythm: regular  Rate: normal     Dental    Pulmonary   Breath sounds clear to auscultation     Abdominal    Neurological - sedated/unconcious                 Anesthesia Plan    ASA 4- EMERGENT   ASA physical status 4 criteria: severe reduction of ejection fractionsASA physical status emergent criteria: acute ischemia (limb, body part, tissue)    Plan - general       Airway plan will be ETT  (Also consented for a-line)    Plan Factors:   Patient did not smoke on day of procedure.      Induction: intravenous    Postoperative Plan: Postoperative administration of opioids is intended.        Informed Consent:    Anesthetic plan and risks discussed with healthcare power of .    Use of blood products discussed with: healthcare power of  whom consented to blood products.

## 2020-08-06 NOTE — PROGRESS NOTES
Reason for consultation /admission : STEMI/cardiogenic shock    On low dose norepinephrine  BP low 100  Intubated, decent gas exchange  Transfused yesterday  Family wish to continue aggressive approach  Vascular plan surgery later today      Review of systems; difficult to perform due to intubation  + foot pain  Denies SOB  No n/v  No and pain  +/- chest pain      Temp:  [37.7 °C (99.9 °F)-38.3 °C (100.9 °F)] 38 °C (100.4 °F)  Pulse:  [] 89  Resp:  [12-38] 25  BP: ()/(55-73) 108/72  SpO2:  [90 %-100 %] 100 %  GENERAL not in acute distress, not dyspnic at rest  Head atraumatic, normocephalic  ENT neck supple, no JVD, no carotid bruits or thyromegaly  Lung decreased breath sound, no rales or wheezing  Heart RRR, normal rate, no murmur,   no gallop or rub  Abd soft, no tenderness, mass or bruits  Ext no edema  left leg; cool to the touch on the left side from the knee down  Mottling in knee and lower ext and they appear coalescing  No dopplerable pulses noted in left DP/TP, and popliteal, +/- femoral.     Right leg: new mottling appearance noted in the right foot. + doppler Rt  PT      Skin no petechiae  Musculoskeletal no deformity  Neuro sedtaed    Monitor reviewed by me showed no significant ventricular or atrial dysrhythmias.    Labs: Cr 1, K+ 3.9  Hb 87  WBC 16      Assessment and plans    1.  Status post LAD stent for anterior MI with residual right coronary artery left circumflex stenosis. EF 20%  Hemodynamically relatively stable on low dose norepinephrine  Vascular surgery plan noted  Somewhat high risk for general anesthesia but surgery unavoidable  Patient and family understand  On DAPT and statin  From cardiac standpoint, may discontinue full dose heparin     2.  Limb ischemia Lt>Rt  Per vascular surgery, probable Lt AKA today     3.  NSVT  No sig recurrence, K+ now NL  Will continue to closely monitor      4. Respiratory failure probably with combination of heart failure and respiratory  issue  Continue careful diuresis as BP allows.  Further management per primary/intensivist     5.  Anemia.  Acute blood loss  May d/c IV heparin as mentioned     6.  Hypokalemia likely diuretic induced +/- vomiting resolved  Cont monitor, replaced as needed      Will follow    Please note that this dictation was created using voice recognition software. I have worked with consultants from the vendor as well as technical experts from [facility] to optimize the interface. I have made every reasonable attempt to correct obvious errors, but I expect that there are errors of grammar and possibly content I did not discover before finalizing the note

## 2020-08-06 NOTE — PROGRESS NOTES
Critical Care Progress Note    Date of admission  7/30/2020    Chief Complaint  60 y.o. male admitted 7/30/2020 with anterior STEMI, status post LAD stenting, cardiogenic shock, respiratory failure    Hospital Course    60 y.o. male who presented 7/30/2020 in transfer from [facility] in [facility] with subtle ST elevation anterior MI.  He has a history of prior CABG, vascular disease and ongoing smoking by report.  He apparently presented earlier in the day with left arm pain and dyspnea with rapid deterioration in his condition requiring intubation and transfer to [facility] for further care.  He was transferred on a nitroglycerin drip to treat significant hypertension initially.  Echocardiogram showed significant drop in ejection fraction to 25-30% with pulmonary edema.  He was taken urgently to the Cath Lab where he underwent successful PCI/stents to the LAD.  He had a brief episode of V. fib arrest requiring 2-3 minutes of CPR and 1 mg of epinephrine.  A left femoral Impella ventricular assist device was placed and temporary right to left external femoral femoral bypass was placed due to low flow distal to the Impella catheter placement with significant underlying iliac and femoral disease noted with multiple collaterals by report.  Paxinos-Josiah catheter is in place.  He is currently intubated on full ventilator support, sedated and unresponsive.  Hemodynamics reviewed in detail and direct handoff performed with cardiology at bedside.  8/2 and 8/3 off inotropes.   8/3 ET tube exchange to 8.0 due to vomiting and had to put high cuff pressure on ET tube size 7.5  8/4 extubated  8/4 Attempted access of left femoral artery, but unable.  8/5 reintubated. Pt was tachycardic, hallucinating and vomited, presumed alcohol withdrawal. NG tube in place with immediate return of 500cc green bile vomit        Interval Problem Update  Reviewed last 24 hour events:  Extubated yesterday,  but reintubated due to vomiting, tachycardic hallucinating and presumed due to severe alcohol withdrawal  NG tube was inserted with immediate return of 500cc green bile fluid.   On precedex for sedation. RASS -4  Fentanyl IV pushes prn pain contol  Febrile with Tm 38.1  HR in 80-90s currently   SBP 90-100s, with MAP >71  ABG 7.5/27/194  Hgb 6.8 this am  Platelets 136 (from 111)  Creatinine 1.18 this am (from 1.09)  Negative 1L fluid balance in the past 24 hours, even since admission      Review of Systems  Review of Systems   Unable to perform ROS: Acuity of condition        Vital Signs for last 24 hours   Temp:  [37.7 °C (99.9 °F)-38.3 °C (100.9 °F)] 38 °C (100.4 °F)  Pulse:  [] 89  Resp:  [12-38] 25  BP: ()/(55-73) 108/72  SpO2:  [90 %-100 %] 100 %    Hemodynamic parameters for last 24 hours       Respiratory Information for the last 24 hours  Vent Mode: APVCMV  Rate (breaths/min): 18  Vt Target (mL): 440  PEEP/CPAP: 8  P Support: 5  MAP: 14  Length of Weaning Trial (Hours): 2  Control VTE (exp VT): 537    Physical Exam   Physical Exam  Vitals signs and nursing note reviewed.   Constitutional:       Comments: Intubated, sedated RASS 0   HENT:      Head: Normocephalic and atraumatic.      Mouth/Throat:      Comments: ET tube in place  Cardiovascular:      Rate and Rhythm: Normal rate and regular rhythm.      Pulses: Normal pulses.      Heart sounds: Normal heart sounds. No murmur.   Pulmonary:      Effort: No respiratory distress.      Breath sounds: Rales present. No wheezing.      Comments: diminsihed breath sounds  Abdominal:      General: Bowel sounds are normal. There is no distension.      Palpations: Abdomen is soft.      Tenderness: There is no abdominal tenderness. There is no guarding.   Musculoskeletal:         General: Swelling present.   Skin:     Capillary Refill: Capillary refill takes more than 3 seconds. 2-3 in right leg and >3 in left leg     Comments: Cool to the touch on the left  side  More prominent mottling in knee and lower ext and they appear coalescing, cool to touch extending up to left thigh    No dopplerable pulses noted in left DP/TP, and popliteal, +/- femoral.     Right leg: new mottling appearance noted in the right foot. Clammy to touch. TP pulse is dopplerable       Neurological:      Mental Status: He is alert.         Medications  Current Facility-Administered Medications   Medication Dose Route Frequency Provider Last Rate Last Dose   • potassium chloride (KCL) ivpb 10 mEq  10 mEq Intravenous Once Chris Ingram D.O.       • famotidine (PEPCID) tablet 20 mg  20 mg Enteral Tube Q12HRS Neo Mayers M.D.   20 mg at 08/06/20 0504    Or   • famotidine (PEPCID) injection 20 mg  20 mg Intravenous Q12HRS Neo Mayers M.D.   20 mg at 08/05/20 1746   • fentaNYL (SUBLIMAZE) injection 50 mcg  50 mcg Intravenous Q15 MIN PRN FRAN Saldana.O.        And   • fentaNYL (SUBLIMAZE) injection 100 mcg  100 mcg Intravenous Q15 MIN PRN Chris Ingram D.O.   100 mcg at 08/06/20 0540    And   • fentaNYL (SUBLIMAZE) 50 mcg/mL in 50mL (Continuous Infusion)   Intravenous Continuous FRAN Saldana.O. 1 mL/hr at 08/06/20 0536 50 mcg/hr at 08/06/20 0536    And   • dexmedetomidine (PRECEDEX) 400 mcg/100mL NS premix infusion  0.1-1.5 mcg/kg/hr Intravenous Continuous FRAN Saldana.O. 9.2 mL/hr at 08/06/20 0724 0.4 mcg/kg/hr at 08/06/20 0724   • LORazepam (ATIVAN) injection 2 mg  2 mg Intravenous Q4HRS PRN FRAN Saldana.O.   2 mg at 08/06/20 0143   • K+ Scale: Goal of 4.5  1 Each Intravenous Q6HRS FRAN Saldana.O.   1 Each at 08/06/20 0600   • oxyCODONE immediate-release (ROXICODONE) tablet 5 mg  5 mg Enteral Tube Q4HRS PRN Chris Ingram D.O.        Or   • oxyCODONE immediate release (ROXICODONE) tablet 10 mg  10 mg Enteral Tube Q4HRS PRN Chris Ingram D.O.   10 mg at 08/04/20 1835   • polyethylene glycol/lytes (MIRALAX) PACKET 1 Packet  1 Packet Enteral Tube BID Neo Mayers M.D.   Stopped at 08/02/20 1800    • bisacodyl (DULCOLAX) suppository 10 mg  10 mg Rectal DAILY Neo Mayers M.D.   Stopped at 08/03/20 0600   • thiamine tablet 100 mg  100 mg Enteral Tube BID Neo Mayers M.D.   100 mg at 08/06/20 0504   • heparin injection 3,200 Units  3,200 Units Intravenous PRN Hamzah Jaquez M.D.   3,200 Units at 08/05/20 0213    And   • heparin infusion 25,000 units in 500 mL 0.45% NACL   Intravenous Continuous Hamzah Jaquez M.D.   Stopped at 08/06/20 0724   • ondansetron (ZOFRAN) syringe/vial injection 4 mg  4 mg Intravenous Q4HRS PRN Son Stoddard Jr., D.O.   4 mg at 08/04/20 2024   • Pharmacy Consult: Enteral tube insertion - review meds/change route/product selection  1 Each Other PHARMACY TO DOSE Neo Mayers M.D.       • ticagrelor (BRILINTA) tablet 90 mg  90 mg Enteral Tube BID Neo Mayers M.D.   90 mg at 08/06/20 0600   • acetaminophen (TYLENOL) tablet 650 mg  650 mg Enteral Tube Q6HRS PRN Neo Mayers M.D.   650 mg at 08/06/20 0525   • DOBUTamine (DOBUTREX) 1 mg/mL premix infusion  0-20 mcg/kg/min Intravenous Continuous Joe Mcgraw M.D.   Stopped at 08/04/20 0935   • norepinephrine (Levophed) infusion 8 mg/250 mL (premix)  0-30 mcg/min Intravenous Continuous Neo Mayers M.D. 11.3 mL/hr at 08/05/20 2015 6 mcg/min at 08/05/20 2015   • aspirin (ASA) chewable tab 81 mg  81 mg Enteral Tube DAILY Hamzah Jaquez M.D.   81 mg at 08/06/20 0504   • atorvastatin (LIPITOR) tablet 40 mg  40 mg Enteral Tube Q EVENING Hamzah Jaquez M.D.   40 mg at 08/05/20 1746   • Respiratory Therapy Consult   Nebulization Continuous RT Zachariah Shipley M.D.       • ipratropium-albuterol (DUONEB) nebulizer solution  3 mL Nebulization Q2HRS PRN (RT) Zachariah Shipley M.D.       • senna-docusate (PERICOLACE or SENOKOT S) 8.6-50 MG per tablet 2 Tab  2 Tab Enteral Tube BID Zachariah Shipley M.D.   Stopped at 08/02/20 1800    And   • polyethylene glycol/lytes (MIRALAX) PACKET 1 Packet  1 Packet Enteral Tube QDAY PRN  Zachariah Shipley M.D.        And   • magnesium hydroxide (MILK OF MAGNESIA) suspension 30 mL  30 mL Enteral Tube QDAY PRN Zachariah Shipley M.D.   30 mL at 08/01/20 1711    And   • bisacodyl (DULCOLAX) suppository 10 mg  10 mg Rectal QDAY PRN Zachariah Shipley M.D.       • MD Alert...ICU Electrolyte Replacement per Pharmacy   Other PHARMACY TO DOSE Zachariah Shipley M.D.       • lidocaine (XYLOCAINE) 1 % injection 1-2 mL  1-2 mL Tracheal Tube Q30 MIN PRN Zachariah Shipley M.D.   2 mL at 08/02/20 0424   • furosemide (LASIX) injection 20 mg  20 mg Intravenous BID DIURETIC Glenn Boss M.D.   20 mg at 08/06/20 0505       Fluids    Intake/Output Summary (Last 24 hours) at 8/6/2020 0726  Last data filed at 8/6/2020 0600  Gross per 24 hour   Intake 2780.89 ml   Output 1710 ml   Net 1070.89 ml       Laboratory  Recent Labs     08/04/20  0445 08/05/20  0137 08/05/20  0343 08/06/20  0513   ISTATAPH 7.537* 7.416 7.508*  --    ISTATAPCO2 25.6* 33.0 27.5  --    ISTATAPO2 80 74 194*  --    ISTATATCO2 22 22 23  --    ZGHCRWV4AZO 97 95 100*  --    ISTATARTHCO3 21.7 21.2 21.8  --    ISTATARTBE -1 -3 -1  --    ISTATTEMP 37.8 C 96.0 F 38.1 C 37.9 C   ISTATFIO2 30  --  60 40   ISTATSPEC Arterial Arterial Arterial Venous   ISTATAPHTC 7.524* 7.437 7.491  --    [id number] 84 67 200*  --          Recent Labs     08/04/20  0327  08/05/20  0516  08/05/20  1759 08/05/20  2330 08/06/20  0510   SODIUM 142  --  144  --   --   --  141   POTASSIUM 3.0*   < > 3.2*   < > 3.5* 3.9 3.9   CHLORIDE 105  --  106  --   --   --  107   CO2 22  --  21  --   --   --  19*   BUN 20  --  23*  --   --   --  24*   CREATININE 1.09  --  1.18  --   --   --  1.02   CALCIUM 8.3*  --  8.2*  --   --   --  8.5    < > = values in this interval not displayed.     Recent Labs     08/04/20 0327 08/05/20 0516 08/06/20  0510   GLUCOSE 127* 147* 117*     Recent Labs     08/04/20 0327 08/05/20 0516 08/06/20  0500   WBC 11.3* 14.7* 16.2*   NEUTSPOLYS 67.70 70.80 75.40*    LYMPHOCYTES 17.80* 14.50* 10.60*   MONOCYTES 10.40 12.40 10.80   EOSINOPHILS 3.30 1.00 1.90   BASOPHILS 0.30 0.30 0.40     Recent Labs     08/04/20  0327  08/05/20  0516  08/05/20  1548 08/05/20  2235 08/06/20  0500 08/06/20  0510   RBC 2.23*  --  2.14*  --   --   --  2.56*  --    HEMOGLOBIN 7.2*  --  6.8*  --   --   --  8.4*  --    HEMATOCRIT 21.7*  --  21.8*  --   --   --  26.4*  --    PLATELETCT 111*  --  136*  --   --   --  144*  --    APTT  --    < >  --    < > 70.8* 65.1*  --  67.1*    < > = values in this interval not displayed.       Imaging  X-Ray:  I have personally reviewed the images and compared with prior images.    Assessment/Plan  * Ischemia of left lower extremity  Assessment & Plan  Likely preexisting PAD prior to hospitalization but not clear to what extent and this was likely exacerbated when impella was placed in left femoral.   Cool clammy left leg with coalescing mottling extending up to the thigh   No dopplerable on left DP/TP and popliteal pulses +/- femoral   8/4 s/p attempted to access left and right CFA. No flash or blood return in left CFA c/w occlusion.   8/4 new mild mottling in the distal foot.     Plan:   Plan for vascular surgery today   NPO  Continue frequent pulses check. Not able to doppler in DP, TP, pop in left leg. Dopplerable pulse on TP and pop on right      Cardiogenic shock (HCC)- (present on admission)  Assessment & Plan  7/30-8/3 cardiogenic shock resolved.   8/1 Impella removed  8/4 back on norepinephrine after reintubated    Plan:  Keep norepinephrine to keep MAP >65  Would like to minimize norepi as much as possible given his severe PAD resulting in mild cyanosis in right lower ext. May be contributing the mottling appearance on the left leg          Acute respiratory failure with hypoxia (HCC)  Assessment & Plan  Secondary to STEMI with acute pulmonary edema, intubated PTA 7/30  8/3 s/p ET tube exchange to 8.0  8/4 extubated  8/5 reintubated due to significant  alcohol withdrawal and vomiting.     Plan:   On precedex gtt with goal RASS 0  Daily SBT  Will be going to OR today, not plan to extubate today. Continue spontaneous mode when tolerate  Continue gentle diuresis  Hold mobility due to OR  Goal sat >92%      Ileus (HCC)  Assessment & Plan  8/4 ileus on abdominal xray. Vomited, delirious, subsequently intubated  Continue continuous NG tube    Acute ST elevation myocardial infarction (STEMI) involving left anterior descending (LAD) coronary artery (HCC)- (present on admission)  Assessment & Plan  7/30 Emergent left heart cath with PCI/stents to LAD  7/30 Complicated by cardiogenic shock, brief VF arrest with CPR x2-3 minutes  8/1 Impella was removed    Plan:   Continue ticagrelor, aspirin, statin  Holding BB due to hypotension       Anemia  Assessment & Plan  8/5 Hgb 6.8, no active bleeding.s/p 1unit PRBC    Alcohol withdrawal delirium (HCC)  Assessment & Plan  Likely alcohol withdrawal with delirium  Heavy alcohol drink prior to hospitalization 1bottle of bourbon daily    Plan:   Continue precedex gtt, aim goal RASS 0  Ativan prn, need to be cautious on this in the setting of likely concomitant ICU delirium  Fentanyl IV prn.       Acute kidney injury (HCC)  Assessment & Plan  Likely multifactorial secondary to ATN/cardiorenal from previous cardiogenic shock  Creatinine stable  Renally dose medications minimize nephrotoxic substances  Monitor this closely, strict I/Os      ASCVD (arteriosclerotic cardiovascular disease)- (present on admission)  Assessment & Plan  With history of prior CABG       VTE:  Heparin  Ulcer: H2 Antagonist  Lines: Central Line  Ongoing indication addressed    I have performed a physical exam and reviewed and updated ROS and Plan today (8/6/2020). In review of yesterday's note (8/5/2020), there are no changes except as documented above.     Discussed patient condition and risk of morbidity and/or mortality with RN, RT, Pharmacy and cardiology      The patient remains critically ill.  Critical care time = 42 minutes in directly providing and coordinating critical care and extensive data review.  No time overlap and excludes procedures.

## 2020-08-06 NOTE — OR SURGEON
Immediate Post OP Note    PreOp Diagnosis: Acute LLE arterial ischemia  STEMI  Cardiogenic shock  Respiratory failure  EtOH withdrawal    PostOp Diagnosis: Same    Procedure(s):  THROMBECTOMY-FEMORAL TO PROFUNDA - Wound Class: Clean  AMPUTATION, ABOVE KNEE - Wound Class: Clean    Surgeon(s):  KITA John M.D.    Anesthesiologist/Type of Anesthesia:  Anesthesiologist: Nikolay Arboleda M.D./General    Surgical Staff:  Circulator: Kennedi Rodriguez R.N.; Kelli Arriaza R.N.  Relief Scrub: Shasha Padilla  Scrub Person: Pedro Luis Desir    Specimens removed if any:  ID Type Source Tests Collected by Time Destination   A : LEFT LOWER LEG Tissue Leg PATHOLOGY SPECIMEN Leonel Coppola M.D. 8/6/2020  3:05 PM        Estimated Blood Loss: 100    Findings: Chronic occlusion of the CFA, EIA and SFA. Minimal acute clot was extracted. Endarterectomy with patch angioplasty, L EIA stenting, and through-knee amputation was performed.    The pt was hemodynamically unstable throughout the case.    Complications: None        8/6/2020 3:34 PM Leonel Coppola M.D.

## 2020-08-07 LAB
GLUCOSE BLD-MCNC: 353 MG/DL (ref 65–99)
PATHOLOGY CONSULT NOTE: NORMAL

## 2020-08-07 NOTE — PROGRESS NOTES
MD  at bedside, discussing current patient status. MD explained comfort care measures to family. Family left for the waiting area to discuss plan.     1816-  MD updated family would like to make pt comfort care at this time.

## 2020-08-07 NOTE — PROGRESS NOTES
1720- Pt bP dropping into 60's, HR increasing 150-190, MD Ingram to bedside, Zoll pads on pt, OK to shock pt per family at bedside.     1723- Monomorphic VTach, Pt shocked 150 joules   1724- Monomorphic VT, 200 joules delivered    1727- 2 amps Bicarb     1736- MD Ingram ordered 1L LR, MD Machuca to bedside     1738, MD Ingram assessing cardiac function with US

## 2020-08-07 NOTE — PROCEDURES
Electrical cardioversion Procedure note    Date: 8/6/2020  Time: 1700    Procedure:  electrical cardioversion, procedural sedation    Indication: monomorphic Vtach, cardiogenic shock  Consent: emergent. Previously was discussed with family about cardioversion and family would like to have cardioversion if necessary.     Procedure: Pads were placed on patient's chest in the anterior-posterior location.  Patient is in monomorphinc VTach with HR in 170-190s, SBP 60/40s (MAP 50s), pt is cardioverted in synchronized fashion, 150J and 200J, and this converted to sinus tachycardia in 140s.     Medications: Pt is already on epi gtt, norepi gtt, dobutamine. Amiodarone 150mg was bolused after then followed with gtt  Complications: none    Chris Ingram DO  Critical Care Medicine

## 2020-08-07 NOTE — DISCHARGE SUMMARY
Death Summary    Cause of Death  Acute ST elevation myocardial infarctions involving left anterior descending artery due to Cardiogenic shock due to respiratory failure with hypoxia due to ischemia of left lower extremity due to arteriorsclerotic cardiovascular disease due to unknown etiology.    Comorbid Conditions at the Time of Death  Principal Problem:    Ischemia of left lower extremity POA: No  Active Problems:    Acute respiratory failure with hypoxia (HCC) POA: Unknown    Cardiogenic shock ([facility]) POA: Yes    Critical lower limb ischemia POA: Yes    S/P CABG x 3 2008 (Chronic) POA: Yes      Overview: SVGx2 occluded cath 7/2020      LIMA atretic    Acute ST elevation myocardial infarction (STEMI) involving left anterior descending (LAD) coronary artery ([facility]) POA: Yes    Leukocytosis POA: Unknown    NSVT (nonsustained ventricular tachycardia) ([facility]) POA: No      Overview: 8/4/2020: 4 seconds      K+ 3    Ileus ([facility]) POA: Unknown    Acute kidney injury ([facility]) POA: Unknown    Coronary artery disease involving native coronary artery with unstable angina pectoris ([facility]) POA: Unknown      Overview: Residual high grade mid and distal RCA and 70% mid LCX after PCI LAD    Alcohol withdrawal delirium ([facility]) POA: Unknown    Anemia POA: Unknown    ASCVD (arteriosclerotic cardiovascular disease) POA: Yes  Resolved Problems:    * No resolved hospital problems. *      History of Presenting Illness and Hospital Course  This is a 60 y.o. male admitted 7/30/2020 with ST elevation MI and had stents placed to LAD, cardiac arrest due to ventricular fibrillation with ROSC obtained, cardiomyopathy with EF 25-30%, impella placed for ventricular assistance and severe iliac and femoral disease and left lower extremity ischemia requiring an insertion of temporary external fem-fem bypass.  Past medical history included tobacco use, etoh use, dyslipidemia, hypertension, MI, arthritis and CABG for cad.  He was treated for ischemia of left lower  extremity with an above knee amputation, cardiogenic shock with impella placed and later removed, acute respiratory failure with hypoxia and on mechanical ventilator with multiple intubations, ileus, anemia with blood transfusion, alcohol withdrawal and acute kidney injury.  Patient progressed to maximal ventilatory support with continued hypoxia and 4 pressors along with an inotrope and less than adequate blood pressure. He received electrical cardioversion for vtach episode.  Multiple physicians discussed poor prognosis and medical status with family at bedside.  Family decided to make patient comfort care and comfort measures placed.  Patient passed after withdrawal of therapy and extubation.      Death Date: 08/06/20   Death Time: 1845         Pronounced By (RN1): Audrey Beal  Pronounced By (RN2): Milla Rose

## 2020-08-07 NOTE — PROGRESS NOTES
Critical care progress note:  Patient on ventilator.  SBP 50-60 and sao2 65-90%.  On dobutamine, vasopressin, neosynephrine (max), levophed (50 mcg/kg/min), epinephrine (20 mcg/kg/min), amiodarone drip and sodium bicarb drip.  Family at bedside.  Discussed with Dr Ingram prior to taking over care of patient.  He was cardioverted by Dr Ingram for vtach at 1700.  Discussed goals of care with family.  Family had a discussion among themselves following our discussion and decided to make the patient comfort care, of which is appropriate given current medical status and poor prognosis and maximal therapy without meeting adequate parameters to be life sustaining.  Comfort care measures placed.

## 2020-08-07 NOTE — RESPIRATORY CARE
Extubation      Events/Summary/Plan: Pt extubated to comfort care per family wishes (08/06/20 6205)

## 2020-08-07 NOTE — DISCHARGE PLANNING
MSW spoke to pt's son Cleve. Cleve requesting information regarding [employer] benefits. MSW emailed information packet to [email or web address].

## 2020-08-13 NOTE — DOCUMENTATION QUERY
[facility]                                                                       Query Response Note      PATIENT:               DONNY SCHRADER  ACCT #:                  [id number]  MRN:                     [id number]  :                      1960  ADMIT DATE:       2020 12:20 PM  DISCH DATE:        2020 6:45 PM  RESPONDING  PROVIDER #:        [id number]           QUERY TEXT:    Leukocytosis, fever, and aspiration pneumonia are documented in the Medical Record. Can a relationship, if any, between these findings be established?    NOTE:  If an appropriate response is not listed below, please respond with a new note.    The patient's Clinical Indicators include:  Per Pulmonary Consultation   -Leukocytosis  -Suspect stress reaction   -However, multiple venous and arterial catheters placed emergently   -Short course of antibiotics to cover for possible pneumonia versus catheter infections    Per Pulmonary Progress Notes   -Antibiotics escalated last night due to concern for infection     Per Cardiology Progress Note   -Febrile illness- COVID negative, receiving broad spectrum Abx     Per Bronchoscopy Procedure Note 8/3  -Indication:  aspiration pneumonitis/pneumonia, acute hypoxic resp failure  -secretions: frothy clear secretions noted mostly in RUL, RLL and some amount in RML   -secretions: frothy clear secretions noted in LLL, minimally in lingula     Per Progress Note   -Extubated yesterday, but reintubated due to vomiting, tachycardic hallucinating, and presumed due to severe alcohol withdrawal     Results Review:   WBC 24.4, 16.9, 13.6, 15.5, 16.0, 14.0, 15.7, 13.6, 10.1, 11.3, 14.7, 16.2, 17.1  lactic acid 1.1, 1.0 1.1,1.3, 9.0  Procalcitonin 0.14  Blood Cultures: Negative x2   COVID 19: negative x2     Treatment:   IV LR bolus, Vanco, Zosyn, Unasyn, Zyvox, Bronchoscopy with BAL, serial CXR, DouNebs, blood cultures,  mechanical venitlation, & RT Protocol    Risk Factors:   Acute STEMI involving LAD, leukocytosis, fever, aspiration pneumonia, presumed alcohol withdrawal, acute respiratory failure, cardiogenic shock, acute kidney injury, & critical lower limb ischemia  Options provided:   -- Sepsis with acute organ dysfunction present on admission, (Please specify the associated acute organ dysfunction)   -- Sepsis without acute organ dysfunction present on admission   -- Sepsis with acute organ dysfunction developed after admission, (Please specify the associated acute organ dysfunction)   -- Sepsis without acute organ dysfunction developed after admission   -- SIRS of non-infectious origin with acute organ dysfunction, (Please specify the associated acute organ dysfunction)   -- SIRS of non-infectious origin without acute organ dysfunction   -- Sepsis/SIRS ruled out   -- Unable to determine      Query created by: Sharon Lott on 8/10/2020 4:27 PM    RESPONSE TEXT:    [Sepsis with acute organ dysfunction developed after admission- Lungs       QUERY TEXT:    Alcohol withdrawal delirium is documented in the Medical Record. Per coding guidelines, alcohol withdrawal is categorized with alcohol dependence. Significant alcohol use has been noted. However, the patient's pattern of alcohol use has not been established. Can this condition be further clarified?     NOTE:  If an appropriate response is not listed below, please respond with a new note.    The patient's Clinical Indicators include:  Per Progress Notes   -Alcohol withdrawal delirium   -Likely alcohol withdrawal with delirium   -Heavy alcohol drink prior to hospitalization 1bottle of yessy daily    Treatment:   Re-intubation, NG Tube, Precedex, & PRN Ativan     Risk Factors:   Hx of heavy alcohol use  Options provided:   -- Alcohol dependence with withdrawal delirium   -- Alcohol abuse only   -- Unable to determine      Query created by: Sharon Lott on 8/10/2020 4:34  PM    RESPONSE TEXT:    Alcohol dependence with withdrawal delirium          Electronically signed by:  GEORGINA MA DO 8/13/2020 10:40 AM

## (undated) DEVICE — SUTURE 3-0 SILK SH C/R 18 IN - (12/BX)

## (undated) DEVICE — TUBE CONNECT SUCTION CLEAR 120 X 1/4" (50EA/CA)"

## (undated) DEVICE — CLIP MED INTNL HRZN TI ESCP - (25/BX)

## (undated) DEVICE — GLOVE BIOGEL PI ORTHO SZ 6 1/2 SURGICAL PF LF (40PR/BX)

## (undated) DEVICE — SET EXTENSION WITH 2 PORTS (48EA/CA) ***PART #2C8610 IS A SUBSTITUTE*****

## (undated) DEVICE — SUTURE 3-0 VICRYL PLUS CT-1 - 36 INCH (36/BX)

## (undated) DEVICE — SUTURE ETHILON 2-0 FSLX 30 (36PK/BX)"

## (undated) DEVICE — DRAPE U ORTHOPEDIC - (10/BX)

## (undated) DEVICE — BLADE SURGICAL #10 - (50/BX)

## (undated) DEVICE — DRAPE SURGICAL U 77X120 - (10/CA)

## (undated) DEVICE — SUTURE CV

## (undated) DEVICE — SUTURE 2-0 SILK 12 X 18" (36PK/BX)"

## (undated) DEVICE — BANDAGE ROLL STERILE BULKEE 4.5 IN X 4 YD (100EA/CA)

## (undated) DEVICE — CATHETER EMBOLECTOMY 3FR (1EA)

## (undated) DEVICE — PROTECTOR ULNA NERVE - (36PR/CA)

## (undated) DEVICE — STAPLER SKIN DISP - (6/BX 10BX/CA) VISISTAT

## (undated) DEVICE — PAD PROVIDONE IODINE 2-1/8X1 (100EA/BX)

## (undated) DEVICE — TUBING CLEARLINK DUO-VENT - C-FLO (48EA/CA)

## (undated) DEVICE — SUTURE 3-0 VICRYL PLUS SH - 27 INCH (36/BX)

## (undated) DEVICE — WATER IRRIGATION STERILE 1000ML (12EA/CA)

## (undated) DEVICE — PACK MINOR BASIN - (2EA/CA)

## (undated) DEVICE — DECANTER FLD BLS - (50/CA)

## (undated) DEVICE — NEPTUNE 4 PORT MANIFOLD - (20/PK)

## (undated) DEVICE — SUCTION INSTRUMENT YANKAUER BULBOUS TIP W/O VENT (50EA/CA)

## (undated) DEVICE — PAD PREP 24 X 48 CUFFED - (100/CA)

## (undated) DEVICE — SUTURE 2-0 SILK SH C/R ETHICON (12PK/BX)

## (undated) DEVICE — SYRINGE 20 ML LL (50EA/BX 4BX/CA)

## (undated) DEVICE — GOWN WARMING STANDARD FLEX - (30/CA)

## (undated) DEVICE — KIT ANESTHESIA W/CIRCUIT & 3/LT BAG W/FILTER (20EA/CA)

## (undated) DEVICE — LACTATED RINGERS INJ 1000 ML - (14EA/CA 60CA/PF)

## (undated) DEVICE — BANDAGE ELASTIC 6 IN X 5 YDS - LATEX FREE (10/BX)

## (undated) DEVICE — GLOVE BIOGEL PI INDICATOR SZ 7.5 SURGICAL PF LF -(50/BX 4BX/CA)

## (undated) DEVICE — MASK ANESTHESIA ADULT  - (100/CA)

## (undated) DEVICE — PAD LAP STERILE 18 X 18 - (5/PK 40PK/CA)

## (undated) DEVICE — SYRINGE SAFETY 3 ML 18 GA X 1 1/2 BLUNT LL (100/BX 8BX/CA)

## (undated) DEVICE — BLADE SURGICAL #11 - (50/BX)

## (undated) DEVICE — SHEATH RO 5F 10CM (10EA/BX)

## (undated) DEVICE — CHLORAPREP 26 ML APPLICATOR - ORANGE TINT(25/CA)

## (undated) DEVICE — SUTURE 6-0 PROLENE C-1 D/A 24 (36PK/BX)"

## (undated) DEVICE — VESSELOOP MAXI BLUE STERILE- SURG-I-LOOP (10EA/BX)

## (undated) DEVICE — GUIDEWIRE HYDROPHILIC COATED ANGLE TIP GLIDEWIRE .038 X 180CM (5EA/BX)"

## (undated) DEVICE — BANDAGE ELASTIC 6 HONEYCOMB - 6X5YD LF (20/CA)"

## (undated) DEVICE — DETERGENT RENUZYME PLUS 10 OZ PACKET (50/BX)

## (undated) DEVICE — GAUZE FLUFF STERILE 2-PLY 36 X 36 (100EA/CA)

## (undated) DEVICE — DISH PETRI STERILE (50EA/CA)

## (undated) DEVICE — GOWN SURGICAL X-LARGE ULTRA - FILM-REINFORCED (20/CA)

## (undated) DEVICE — HEAD HOLDER JUNIOR/ADULT

## (undated) DEVICE — BOVIE BLADE COATED - (50/PK)

## (undated) DEVICE — SLEEVE, VASO, THIGH, MED

## (undated) DEVICE — CANISTER SUCTION 3000ML MECHANICAL FILTER AUTO SHUTOFF MEDI-VAC NONSTERILE LF DISP  (40EA/CA)

## (undated) DEVICE — SYRINGE SAFETY 10 ML 18 GA X 1 1/2 BLUNT LL (100/BX 4BX/CA)

## (undated) DEVICE — SPONGE GAUZESTER. 2X2 4-PL - (2/PK 50PK/BX 30BX/CS)

## (undated) DEVICE — DRESSING TRANSPARENT FILM TEGADERM 4 X 4.75" (50EA/BX)"

## (undated) DEVICE — GLOVE BIOGEL PI INDICATOR SZ 7.0 SURGICAL PF LF - (50/BX 4BX/CA)

## (undated) DEVICE — PACK LOWER EXTREMITY - (2/CA)

## (undated) DEVICE — SUTURE 2-0 VICRYL PLUS CT-1 - 8 X 18 INCH(12/BX)

## (undated) DEVICE — ELECTRODE 850 FOAM ADHESIVE - HYDROGEL RADIOTRNSPRNT (50/PK)

## (undated) DEVICE — CATHETER EMBOLECTOMY 4FR (5EA/CA)

## (undated) DEVICE — ELECTRODE DUAL RETURN W/ CORD - (50/PK)

## (undated) DEVICE — SUTURE 5-0 PROLENE C-1 D/A 24 (36PK/BX)"

## (undated) DEVICE — SHEATH RO 6F 10CM (10EA/BX)

## (undated) DEVICE — BLADE SURGICAL #15 - (50/BX 3BX/CA)

## (undated) DEVICE — SYRINGE SAFETY 5 ML 18 GA X 1-1/2 BLUNT LL (100/BX 4BX/CA)

## (undated) DEVICE — CLIP SM INTNL HRZN TI ESCP LGT - (24EA/PK 25PK/BX)

## (undated) DEVICE — SYRINGE 30 ML LL (56/BX)

## (undated) DEVICE — SUCTION INSTRUMENT YANKAUER OPEN TIP W/O VENT (50EA/CA)

## (undated) DEVICE — GLIDECATH ANGLE 4F X 70CM (5EA/BX)

## (undated) DEVICE — SUTURE 6-0 PROLENE BV-1 D/A 24 (36PK/BX)"

## (undated) DEVICE — SODIUM CHL IRRIGATION 0.9% 1000ML (12EA/CA)

## (undated) DEVICE — PENCIL ELECTSURG 10FT BTN SWH - (50/CA)

## (undated) DEVICE — BLADE SAGITTAL SAW DUAL CUT 75.0 X 25.0MM (1/EA)

## (undated) DEVICE — DRAPE LARGE 3 QUARTER - (20/CA)

## (undated) DEVICE — GLOVE SZ 6.5 BIOGEL PI MICRO - PF LF (50PR/BX)

## (undated) DEVICE — DEVICE INFLATION DIGITAL BLUE DIAMOND (5EA/BX)

## (undated) DEVICE — VESSELOOP MINI BLUE STERILE - SURG-I-LOOP (10EA/BX)

## (undated) DEVICE — SOD. CHL. INJ. 0.9% 250 ML - (36/CA 50CA/PF)

## (undated) DEVICE — SENSOR SPO2 NEO LNCS ADHESIVE (20/BX) SEE USER NOTES

## (undated) DEVICE — SET LEADWIRE 5 LEAD BEDSIDE DISPOSABLE ECG (1SET OF 5/EA)

## (undated) DEVICE — SUTURE GENERAL

## (undated) DEVICE — GLOVE BIOGEL SZ 7.5 SURGICAL PF LTX - (50PR/BX 4BX/CA)

## (undated) DEVICE — SURGIFOAM (12X7) - (12EA/CA)

## (undated) DEVICE — SUTURE 3-0 SILK 12 X 18 IN - (36/BX)